# Patient Record
Sex: FEMALE | Race: WHITE | NOT HISPANIC OR LATINO | Employment: FULL TIME | URBAN - METROPOLITAN AREA
[De-identification: names, ages, dates, MRNs, and addresses within clinical notes are randomized per-mention and may not be internally consistent; named-entity substitution may affect disease eponyms.]

---

## 2017-03-06 ENCOUNTER — ALLSCRIPTS OFFICE VISIT (OUTPATIENT)
Dept: OTHER | Facility: OTHER | Age: 42
End: 2017-03-06

## 2017-03-06 DIAGNOSIS — R51.9 HEADACHE: ICD-10-CM

## 2017-03-06 DIAGNOSIS — R73.09 OTHER ABNORMAL GLUCOSE: ICD-10-CM

## 2017-03-06 DIAGNOSIS — E78.5 HYPERLIPIDEMIA: ICD-10-CM

## 2017-03-06 DIAGNOSIS — J45.909 UNCOMPLICATED ASTHMA: ICD-10-CM

## 2017-03-06 DIAGNOSIS — E88.9 METABOLIC DISORDER: ICD-10-CM

## 2017-05-02 ENCOUNTER — APPOINTMENT (OUTPATIENT)
Dept: LAB | Facility: HOSPITAL | Age: 42
End: 2017-05-02
Payer: COMMERCIAL

## 2017-05-02 DIAGNOSIS — E78.5 HYPERLIPIDEMIA: ICD-10-CM

## 2017-05-02 DIAGNOSIS — R51.9 HEADACHE: ICD-10-CM

## 2017-05-02 DIAGNOSIS — E88.9 METABOLIC DISORDER: ICD-10-CM

## 2017-05-02 DIAGNOSIS — R73.09 OTHER ABNORMAL GLUCOSE: ICD-10-CM

## 2017-05-02 DIAGNOSIS — J45.909 UNCOMPLICATED ASTHMA: ICD-10-CM

## 2017-05-02 LAB
25(OH)D3 SERPL-MCNC: 11.8 NG/ML (ref 30–100)
ALBUMIN SERPL BCP-MCNC: 3.5 G/DL (ref 3.5–5)
ALP SERPL-CCNC: 88 U/L (ref 46–116)
ALT SERPL W P-5'-P-CCNC: 18 U/L (ref 12–78)
ANION GAP SERPL CALCULATED.3IONS-SCNC: 9 MMOL/L (ref 4–13)
AST SERPL W P-5'-P-CCNC: 9 U/L (ref 5–45)
BILIRUB SERPL-MCNC: 0.25 MG/DL (ref 0.2–1)
BUN SERPL-MCNC: 10 MG/DL (ref 5–25)
CALCIUM SERPL-MCNC: 9 MG/DL (ref 8.3–10.1)
CHLORIDE SERPL-SCNC: 105 MMOL/L (ref 100–108)
CHOLEST SERPL-MCNC: 176 MG/DL (ref 50–200)
CO2 SERPL-SCNC: 23 MMOL/L (ref 21–32)
CREAT SERPL-MCNC: 0.61 MG/DL (ref 0.6–1.3)
EST. AVERAGE GLUCOSE BLD GHB EST-MCNC: 143 MG/DL
GFR SERPL CREATININE-BSD FRML MDRD: >60 ML/MIN/1.73SQ M
GLUCOSE P FAST SERPL-MCNC: 140 MG/DL (ref 65–99)
HBA1C MFR BLD: 6.6 % (ref 4.2–6.3)
HDLC SERPL-MCNC: 31 MG/DL (ref 40–60)
LDLC SERPL CALC-MCNC: 116 MG/DL (ref 0–100)
POTASSIUM SERPL-SCNC: 4.2 MMOL/L (ref 3.5–5.3)
PROT SERPL-MCNC: 7.2 G/DL (ref 6.4–8.2)
SODIUM SERPL-SCNC: 137 MMOL/L (ref 136–145)
TRIGL SERPL-MCNC: 144 MG/DL
TSH SERPL DL<=0.05 MIU/L-ACNC: 3.12 UIU/ML (ref 0.36–3.74)

## 2017-05-02 PROCEDURE — 84443 ASSAY THYROID STIM HORMONE: CPT

## 2017-05-02 PROCEDURE — 80061 LIPID PANEL: CPT

## 2017-05-02 PROCEDURE — 82306 VITAMIN D 25 HYDROXY: CPT

## 2017-05-02 PROCEDURE — 80053 COMPREHEN METABOLIC PANEL: CPT

## 2017-05-02 PROCEDURE — 83036 HEMOGLOBIN GLYCOSYLATED A1C: CPT

## 2017-05-02 PROCEDURE — 36415 COLL VENOUS BLD VENIPUNCTURE: CPT

## 2017-05-04 ENCOUNTER — ALLSCRIPTS OFFICE VISIT (OUTPATIENT)
Dept: OTHER | Facility: OTHER | Age: 42
End: 2017-05-04

## 2017-05-04 DIAGNOSIS — M54.50 LOW BACK PAIN: ICD-10-CM

## 2017-06-28 ENCOUNTER — GENERIC CONVERSION - ENCOUNTER (OUTPATIENT)
Dept: OTHER | Facility: OTHER | Age: 42
End: 2017-06-28

## 2017-08-01 DIAGNOSIS — R73.09 OTHER ABNORMAL GLUCOSE: ICD-10-CM

## 2017-08-01 DIAGNOSIS — E78.5 HYPERLIPIDEMIA: ICD-10-CM

## 2018-01-11 NOTE — RESULT NOTES
Message  Our records indicate that you are overdue for a diabetic eye exam  Please contact your eye doctor to schedule an appointment with their office  If you do not have an eye doctor, please contact our office and we will recommend one for you  We can be reached at [phone]   Thank you      Signatures   Electronically signed by : Dia Hawkins, ; Jun 28 2017 11:26AM EST                       (Author)

## 2018-01-13 VITALS
SYSTOLIC BLOOD PRESSURE: 138 MMHG | DIASTOLIC BLOOD PRESSURE: 80 MMHG | WEIGHT: 214 LBS | BODY MASS INDEX: 44.92 KG/M2 | HEIGHT: 58 IN

## 2018-01-14 VITALS
SYSTOLIC BLOOD PRESSURE: 140 MMHG | HEIGHT: 58 IN | DIASTOLIC BLOOD PRESSURE: 88 MMHG | RESPIRATION RATE: 1 BRPM | BODY MASS INDEX: 45 KG/M2 | WEIGHT: 214.38 LBS

## 2018-09-17 ENCOUNTER — TELEPHONE (OUTPATIENT)
Dept: FAMILY MEDICINE CLINIC | Facility: CLINIC | Age: 43
End: 2018-09-17

## 2018-09-17 NOTE — TELEPHONE ENCOUNTER
Patient called and stated she needs a form filled out so she can get kinship however she has no insurance and can not afford the cost of a physical  Asking if you can fill the form out for her without an appt  States there is only 2 questions on the form

## 2018-09-18 ENCOUNTER — TELEPHONE (OUTPATIENT)
Dept: FAMILY MEDICINE CLINIC | Facility: CLINIC | Age: 43
End: 2018-09-18

## 2018-09-19 NOTE — TELEPHONE ENCOUNTER
Can you please do the PPL form 
Form faxed
Patient called and asked if we can call PPL to get her power back on   Asking if we can do this again for her,    Act # [phone]  Act Gonzalez Name- Santi Tubbs
Please do 1 more time but let her know we cannot do it after that
Followed protocol

## 2020-03-20 ENCOUNTER — TELEPHONE (OUTPATIENT)
Dept: FAMILY MEDICINE CLINIC | Facility: CLINIC | Age: 45
End: 2020-03-20

## 2020-03-20 NOTE — TELEPHONE ENCOUNTER
If possible we need to find out more information  When did this happen  If the friend who has it and was not in the room with the patient she was visiting then that is more a very remote situation  Right now that would not qualify her to be tested  Obviously if she started with fever and or cough she would need to call our office over the weekend and [facility] with director as to what to do    Has her employer guided her on any specific situation or not

## 2020-03-20 NOTE — TELEPHONE ENCOUNTER
Patient works in the field for healthcare  She came in contact with a person that tested positive for COVID-19  She was doing a visit for a patient and their friend has it  Should she continue to work or stay home?     [phone]

## 2020-03-20 NOTE — TELEPHONE ENCOUNTER
I spoke to the patient, she is feeling fine  She was at the home of a client today, while in the home the client was notified that her granddaughter had developed a fever of 103 and was on her way to be tested for covid-19  The granddaughter was last in the home over the weekend  Patient had no contact with the granddaughter  Patient did contact her employer who told her to call her PCP  Patient is asking if she needs to stay quarantined or if she is able to work at this time  She is currently not having any symptoms other than her normal asthma/allergy type symptoms, no fever

## 2020-03-20 NOTE — TELEPHONE ENCOUNTER
Patient requires no precautions at this time until further testing comes back and or if she does develop symptoms

## 2020-07-13 ENCOUNTER — OFFICE VISIT (OUTPATIENT)
Dept: FAMILY MEDICINE CLINIC | Facility: CLINIC | Age: 45
End: 2020-07-13

## 2020-07-13 VITALS
WEIGHT: 211 LBS | RESPIRATION RATE: 18 BRPM | HEIGHT: 59 IN | SYSTOLIC BLOOD PRESSURE: 136 MMHG | HEART RATE: 88 BPM | TEMPERATURE: 97.6 F | DIASTOLIC BLOOD PRESSURE: 80 MMHG | BODY MASS INDEX: 42.54 KG/M2

## 2020-07-13 DIAGNOSIS — G43.909 MIGRAINE WITHOUT STATUS MIGRAINOSUS, NOT INTRACTABLE, UNSPECIFIED MIGRAINE TYPE: ICD-10-CM

## 2020-07-13 DIAGNOSIS — J45.40 MODERATE PERSISTENT ASTHMA WITHOUT COMPLICATION: ICD-10-CM

## 2020-07-13 DIAGNOSIS — E11.9 CONTROLLED TYPE 2 DIABETES MELLITUS WITHOUT COMPLICATION, WITHOUT LONG-TERM CURRENT USE OF INSULIN (HCC): ICD-10-CM

## 2020-07-13 DIAGNOSIS — E55.9 VITAMIN D DEFICIENCY: ICD-10-CM

## 2020-07-13 DIAGNOSIS — J30.2 SEASONAL ALLERGIES: ICD-10-CM

## 2020-07-13 DIAGNOSIS — K21.9 GASTROESOPHAGEAL REFLUX DISEASE, ESOPHAGITIS PRESENCE NOT SPECIFIED: ICD-10-CM

## 2020-07-13 DIAGNOSIS — Z12.31 SCREENING MAMMOGRAM, ENCOUNTER FOR: Primary | ICD-10-CM

## 2020-07-13 RX ORDER — TOPIRAMATE 25 MG/1
TABLET ORAL
COMMUNITY
Start: 2013-03-28 | End: 2020-07-14 | Stop reason: SDUPTHER

## 2020-07-13 RX ORDER — ALBUTEROL SULFATE 2.5 MG/3ML
1 SOLUTION RESPIRATORY (INHALATION) EVERY 4 HOURS PRN
COMMUNITY
End: 2021-03-02 | Stop reason: SDUPTHER

## 2020-07-13 RX ORDER — RIZATRIPTAN BENZOATE 5 MG/1
1 TABLET ORAL
COMMUNITY
Start: 2017-05-04 | End: 2020-07-14 | Stop reason: SDUPTHER

## 2020-07-13 RX ORDER — OMEPRAZOLE 20 MG/1
1 CAPSULE, DELAYED RELEASE ORAL DAILY
COMMUNITY
Start: 2012-03-28 | End: 2020-07-14

## 2020-07-13 RX ORDER — LORATADINE 10 MG/1
1 TABLET ORAL DAILY PRN
COMMUNITY
Start: 2014-05-29 | End: 2020-07-14 | Stop reason: SDUPTHER

## 2020-07-13 RX ORDER — ALBUTEROL SULFATE 90 UG/1
1-2 AEROSOL, METERED RESPIRATORY (INHALATION)
COMMUNITY
Start: 2011-05-18 | End: 2021-03-02 | Stop reason: SDUPTHER

## 2020-07-13 RX ORDER — MONTELUKAST SODIUM 10 MG/1
1 TABLET ORAL
COMMUNITY
Start: 2013-04-26 | End: 2020-07-14 | Stop reason: SDUPTHER

## 2020-07-14 RX ORDER — LORATADINE 10 MG/1
10 TABLET ORAL DAILY PRN
Qty: 30 TABLET | Refills: 5 | Status: SHIPPED | OUTPATIENT
Start: 2020-07-14 | End: 2021-03-11

## 2020-07-14 RX ORDER — OMEPRAZOLE 20 MG/1
20 CAPSULE, DELAYED RELEASE ORAL DAILY
Qty: 30 CAPSULE | Refills: 5 | Status: SHIPPED | OUTPATIENT
Start: 2020-07-14 | End: 2020-12-04

## 2020-07-14 RX ORDER — TOPIRAMATE 25 MG/1
TABLET ORAL
Qty: 120 TABLET | Refills: 1 | Status: SHIPPED | OUTPATIENT
Start: 2020-07-14 | End: 2020-09-13

## 2020-07-14 RX ORDER — RIZATRIPTAN BENZOATE 5 MG/1
TABLET ORAL
Qty: 9 TABLET | Refills: 2 | Status: SHIPPED | OUTPATIENT
Start: 2020-07-14 | End: 2021-03-02 | Stop reason: SDUPTHER

## 2020-07-14 RX ORDER — MONTELUKAST SODIUM 10 MG/1
10 TABLET ORAL
Qty: 30 TABLET | Refills: 5 | Status: SHIPPED | OUTPATIENT
Start: 2020-07-14 | End: 2020-12-02

## 2020-07-16 LAB
25(OH)D3 SERPL-MCNC: 18 NG/ML (ref 30–100)
ALBUMIN SERPL-MCNC: 4.2 G/DL (ref 3.6–5.1)
ALBUMIN/CREAT UR: ABNORMAL MCG/MG CREAT
ALBUMIN/GLOB SERPL: 1.5 (CALC) (ref 1–2.5)
ALP SERPL-CCNC: 83 U/L (ref 31–125)
ALT SERPL-CCNC: 9 U/L (ref 6–29)
AST SERPL-CCNC: 11 U/L (ref 10–30)
BILIRUB SERPL-MCNC: 0.3 MG/DL (ref 0.2–1.2)
BUN SERPL-MCNC: 11 MG/DL (ref 7–25)
BUN/CREAT SERPL: ABNORMAL (CALC) (ref 6–22)
CALCIUM SERPL-MCNC: 9.7 MG/DL (ref 8.6–10.2)
CHLORIDE SERPL-SCNC: 106 MMOL/L (ref 98–110)
CHOLEST SERPL-MCNC: 211 MG/DL
CHOLEST/HDLC SERPL: 5.6 (CALC)
CO2 SERPL-SCNC: 21 MMOL/L (ref 20–32)
CREAT SERPL-MCNC: 0.6 MG/DL (ref 0.5–1.1)
CREAT UR-MCNC: 15 MG/DL (ref 20–275)
GLOBULIN SER CALC-MCNC: 2.8 G/DL (CALC) (ref 1.9–3.7)
GLUCOSE SERPL-MCNC: 108 MG/DL (ref 65–99)
HBA1C MFR BLD: 6.3 % OF TOTAL HGB
HDLC SERPL-MCNC: 38 MG/DL
LDLC SERPL CALC-MCNC: 143 MG/DL (CALC)
MICROALBUMIN UR-MCNC: <0.2 MG/DL
NONHDLC SERPL-MCNC: 173 MG/DL (CALC)
POTASSIUM SERPL-SCNC: 4.4 MMOL/L (ref 3.5–5.3)
PROT SERPL-MCNC: 7 G/DL (ref 6.1–8.1)
SL AMB EGFR AFRICAN AMERICAN: 129 ML/MIN/1.73M2
SL AMB EGFR NON AFRICAN AMERICAN: 112 ML/MIN/1.73M2
SODIUM SERPL-SCNC: 136 MMOL/L (ref 135–146)
TRIGL SERPL-MCNC: 161 MG/DL
TSH SERPL-ACNC: 2.53 MIU/L

## 2020-07-21 ENCOUNTER — TELEPHONE (OUTPATIENT)
Dept: FAMILY MEDICINE CLINIC | Facility: CLINIC | Age: 45
End: 2020-07-21

## 2020-07-21 NOTE — TELEPHONE ENCOUNTER
----- Message from Fidencio Phoenix DO sent at 6/01/8631  8:36 AM EDT -----  Please let the patient know that all her labs were okay except her cholesterol is 211  A1c was under good control for her diabetes  Vitamin-D was 18  Recommend at least 2-3000 units of over-the-counter vitamin-D daily    Needs office visit in 6 months with office A1c at that time

## 2020-09-12 DIAGNOSIS — G43.909 MIGRAINE WITHOUT STATUS MIGRAINOSUS, NOT INTRACTABLE, UNSPECIFIED MIGRAINE TYPE: ICD-10-CM

## 2020-09-13 RX ORDER — TOPIRAMATE 25 MG/1
TABLET ORAL
Qty: 120 TABLET | Refills: 1 | Status: SHIPPED | OUTPATIENT
Start: 2020-09-13 | End: 2021-02-05

## 2020-12-01 DIAGNOSIS — J45.40 MODERATE PERSISTENT ASTHMA WITHOUT COMPLICATION: ICD-10-CM

## 2020-12-01 DIAGNOSIS — J30.2 SEASONAL ALLERGIES: ICD-10-CM

## 2020-12-02 RX ORDER — MONTELUKAST SODIUM 10 MG/1
TABLET ORAL
Qty: 30 TABLET | Refills: 5 | Status: SHIPPED | OUTPATIENT
Start: 2020-12-02 | End: 2021-03-02 | Stop reason: SDUPTHER

## 2020-12-03 DIAGNOSIS — K21.9 GASTROESOPHAGEAL REFLUX DISEASE: ICD-10-CM

## 2020-12-04 RX ORDER — OMEPRAZOLE 20 MG/1
CAPSULE, DELAYED RELEASE ORAL
Qty: 30 CAPSULE | Refills: 5 | Status: SHIPPED | OUTPATIENT
Start: 2020-12-04 | End: 2021-05-25

## 2021-01-27 DIAGNOSIS — J45.40 MODERATE PERSISTENT ASTHMA WITHOUT COMPLICATION: ICD-10-CM

## 2021-02-05 DIAGNOSIS — G43.909 MIGRAINE WITHOUT STATUS MIGRAINOSUS, NOT INTRACTABLE, UNSPECIFIED MIGRAINE TYPE: ICD-10-CM

## 2021-02-05 RX ORDER — TOPIRAMATE 25 MG/1
TABLET ORAL
Qty: 120 TABLET | Refills: 1 | Status: SHIPPED | OUTPATIENT
Start: 2021-02-05 | End: 2021-03-01

## 2021-02-28 DIAGNOSIS — G43.909 MIGRAINE WITHOUT STATUS MIGRAINOSUS, NOT INTRACTABLE, UNSPECIFIED MIGRAINE TYPE: ICD-10-CM

## 2021-03-01 RX ORDER — TOPIRAMATE 25 MG/1
TABLET ORAL
Qty: 120 TABLET | Refills: 1 | Status: SHIPPED | OUTPATIENT
Start: 2021-03-01 | End: 2021-03-02 | Stop reason: SDUPTHER

## 2021-03-02 ENCOUNTER — OFFICE VISIT (OUTPATIENT)
Dept: FAMILY MEDICINE CLINIC | Facility: CLINIC | Age: 46
End: 2021-03-02
Payer: COMMERCIAL

## 2021-03-02 VITALS
DIASTOLIC BLOOD PRESSURE: 80 MMHG | OXYGEN SATURATION: 97 % | WEIGHT: 200.4 LBS | TEMPERATURE: 97.6 F | HEIGHT: 59 IN | SYSTOLIC BLOOD PRESSURE: 124 MMHG | BODY MASS INDEX: 40.4 KG/M2 | HEART RATE: 89 BPM | RESPIRATION RATE: 16 BRPM

## 2021-03-02 DIAGNOSIS — E11.9 CONTROLLED TYPE 2 DIABETES MELLITUS WITHOUT COMPLICATION, WITHOUT LONG-TERM CURRENT USE OF INSULIN (HCC): Primary | ICD-10-CM

## 2021-03-02 DIAGNOSIS — G43.909 MIGRAINE WITHOUT STATUS MIGRAINOSUS, NOT INTRACTABLE, UNSPECIFIED MIGRAINE TYPE: ICD-10-CM

## 2021-03-02 DIAGNOSIS — Z23 NEED FOR TDAP VACCINATION: ICD-10-CM

## 2021-03-02 DIAGNOSIS — Z11.4 SCREENING FOR HIV (HUMAN IMMUNODEFICIENCY VIRUS): ICD-10-CM

## 2021-03-02 DIAGNOSIS — Z23 ENCOUNTER FOR IMMUNIZATION: ICD-10-CM

## 2021-03-02 DIAGNOSIS — J30.2 SEASONAL ALLERGIES: ICD-10-CM

## 2021-03-02 DIAGNOSIS — Z11.4 ENCOUNTER FOR SCREENING FOR HIV: ICD-10-CM

## 2021-03-02 DIAGNOSIS — J45.40 MODERATE PERSISTENT ASTHMA WITHOUT COMPLICATION: ICD-10-CM

## 2021-03-02 PROCEDURE — 90715 TDAP VACCINE 7 YRS/> IM: CPT

## 2021-03-02 PROCEDURE — 3008F BODY MASS INDEX DOCD: CPT | Performed by: FAMILY MEDICINE

## 2021-03-02 PROCEDURE — 90471 IMMUNIZATION ADMIN: CPT

## 2021-03-02 PROCEDURE — 3725F SCREEN DEPRESSION PERFORMED: CPT | Performed by: FAMILY MEDICINE

## 2021-03-02 PROCEDURE — 83036 HEMOGLOBIN GLYCOSYLATED A1C: CPT | Performed by: FAMILY MEDICINE

## 2021-03-02 PROCEDURE — 99214 OFFICE O/P EST MOD 30 MIN: CPT | Performed by: FAMILY MEDICINE

## 2021-03-02 RX ORDER — TOPIRAMATE 25 MG/1
50 TABLET ORAL 2 TIMES DAILY
Qty: 120 TABLET | Refills: 1 | Status: SHIPPED | OUTPATIENT
Start: 2021-03-02 | End: 2021-03-31

## 2021-03-02 RX ORDER — ALBUTEROL SULFATE 2.5 MG/3ML
2.5 SOLUTION RESPIRATORY (INHALATION) EVERY 4 HOURS PRN
Qty: 25 VIAL | Refills: 3 | Status: SHIPPED | OUTPATIENT
Start: 2021-03-02 | End: 2022-07-27 | Stop reason: SDUPTHER

## 2021-03-02 RX ORDER — MONTELUKAST SODIUM 10 MG/1
10 TABLET ORAL
Qty: 30 TABLET | Refills: 5 | Status: SHIPPED | OUTPATIENT
Start: 2021-03-02 | End: 2021-11-24

## 2021-03-02 RX ORDER — ALBUTEROL SULFATE 90 UG/1
1-2 AEROSOL, METERED RESPIRATORY (INHALATION) EVERY 4 HOURS PRN
Qty: 1 INHALER | Refills: 2 | Status: SHIPPED | OUTPATIENT
Start: 2021-03-02 | End: 2022-07-27 | Stop reason: SDUPTHER

## 2021-03-02 RX ORDER — FLUTICASONE PROPIONATE 50 MCG
1 SPRAY, SUSPENSION (ML) NASAL DAILY
Qty: 1 BOTTLE | Refills: 5 | Status: SHIPPED | OUTPATIENT
Start: 2021-03-02 | End: 2022-07-27 | Stop reason: SDUPTHER

## 2021-03-02 RX ORDER — CETIRIZINE HYDROCHLORIDE 10 MG/1
10 TABLET ORAL DAILY
Qty: 30 TABLET | Refills: 5 | Status: SHIPPED | OUTPATIENT
Start: 2021-03-02 | End: 2022-07-27 | Stop reason: SDUPTHER

## 2021-03-02 RX ORDER — RIZATRIPTAN BENZOATE 5 MG/1
TABLET ORAL
Qty: 9 TABLET | Refills: 2 | Status: SHIPPED | OUTPATIENT
Start: 2021-03-02 | End: 2021-09-28

## 2021-03-11 PROBLEM — E66.01 MORBID OBESITY (HCC): Status: ACTIVE | Noted: 2021-03-11

## 2021-03-11 NOTE — PROGRESS NOTES
Assessment/Plan: the A1c in July was 6 3  Full labs ordered lipid profile TSH CMP microalbumin creatinine ratio  Staff has ordered HIV even though patient feels she is low risk  Recommend yearly diabetic eye and foot examination  Risk factor modification with regards to morbid obesity  COVID vaccine discussed  Recommend referral to gyn for overdue Pap smear/ way overdue  Asthma stable  Migraines stable  Tdap given,  Patient's shoes and socks removed  Right Foot/Ankle   Right Foot Inspection  Skin Exam: skin normal and skin intact no dry skin, no warmth, no callus, no erythema, no maceration, no abnormal color, no pre-ulcer, no ulcer and no callus                          Toe Exam:  no right toe deformity  Sensory       Monofilament testing: intact  Vascular    The right DP pulse is 2+  The right PT pulse is 2+  Left Foot/Ankle  Left Foot Inspection  Skin Exam: skin normal and skin intactno dry skin, no warmth, no erythema, no maceration, normal color, no pre-ulcer, no ulcer and no callus                         Toe Exam: no left toe deformity                   Sensory       Monofilament: intact  Vascular    The left DP pulse is 2+  The left PT pulse is 2+  Assign Risk Category:  No deformity present; No loss of protective sensation; No weak pulses       Risk: 0           Diagnoses and all orders for this visit:    Controlled type 2 diabetes mellitus without complication, without long-term current use of insulin (HCC)  -     Microalbumin / creatinine urine ratio  -     Hemoglobin A1C; Future  -     Comprehensive metabolic panel; Future  -     TSH, 3rd generation; Future  -     POCT hemoglobin A1c  -     Lipid panel; Future    Moderate persistent asthma without complication  -     fluticasone-salmeterol (Advair Diskus) 250-50 mcg/dose inhaler; Inhale 1 puff 2 (two) times a day Rinse mouth after use  -     albuterol (2 5 mg/3 mL) 0 083 % nebulizer solution;  Take 1 vial (2 5 mg total) by nebulization every 4 (four) hours as needed for wheezing  -     albuterol (Ventolin HFA) 90 mcg/act inhaler; Inhale 1-2 puffs every 4 (four) hours as needed for wheezing  -     montelukast (SINGULAIR) 10 mg tablet; Take 1 tablet (10 mg total) by mouth daily at bedtime    Migraine without status migrainosus, not intractable, unspecified migraine type  -     topiramate (TOPAMAX) 25 mg tablet; Take 2 tablets (50 mg total) by mouth 2 (two) times a day  -     rizatriptan (MAXALT) 5 MG tablet; Take 1 tablet at onset of migraine  May repeat 2nd dose 2 hours later if migraine not improved  -     Ambulatory referral to Neurology; Future    Seasonal allergies  -     montelukast (SINGULAIR) 10 mg tablet; Take 1 tablet (10 mg total) by mouth daily at bedtime  -     cetirizine (ZyrTEC) 10 mg tablet; Take 1 tablet (10 mg total) by mouth daily  -     fluticasone (FLONASE) 50 mcg/act nasal spray; 1 spray into each nostril daily    Screening for HIV (human immunodeficiency virus)  -     HIV 1/2 Antigen/Antibody (4th Generation) w Reflex [facility]; Future    Encounter for immunization  -     tetanus-diphtheria-acellular pertussis (ADACEL) 5-2-15 5 LF-mcg/0 5 injection; Inject 0 5 mL into a muscle once for 1 dose    Encounter for screening for HIV    Need for Tdap vaccination  -     TDAP VACCINE GREATER THAN OR EQUAL TO 8YO IM    Other orders  -     Cancel: TDAP VACCINE GREATER THAN OR EQUAL TO 8YO IM  -     Cancel: Hemoglobin A1C ([facility]); Future  -     Cancel: Ambulatory Referral to Ophthalmology; Future  -     Cancel: Ambulatory referral to Obstetrics / Gynecology; Future        1  Controlled type 2 diabetes mellitus without complication, without long-term current use of insulin (HCC)  Microalbumin / creatinine urine ratio    Hemoglobin A1C    Comprehensive metabolic panel    TSH, 3rd generation    POCT hemoglobin A1c    Lipid panel   2   Moderate persistent asthma without complication  fluticasone-salmeterol (Advair Diskus) 250-50 mcg/dose inhaler    albuterol (2 5 mg/3 mL) 0 083 % nebulizer solution    albuterol (Ventolin HFA) 90 mcg/act inhaler    montelukast (SINGULAIR) 10 mg tablet   3  Migraine without status migrainosus, not intractable, unspecified migraine type  topiramate (TOPAMAX) 25 mg tablet    rizatriptan (MAXALT) 5 MG tablet    Ambulatory referral to Neurology   4  Seasonal allergies  montelukast (SINGULAIR) 10 mg tablet    cetirizine (ZyrTEC) 10 mg tablet    fluticasone (FLONASE) 50 mcg/act nasal spray   5  Screening for HIV (human immunodeficiency virus)  HIV 1/2 Antigen/Antibody (4th Generation) w Reflex [facility]   6  Encounter for immunization  tetanus-diphtheria-acellular pertussis (ADACEL) 5-2-15 5 LF-mcg/0 5 injection   7  Encounter for screening for HIV     8  Need for Tdap vaccination  TDAP VACCINE GREATER THAN OR EQUAL TO 8YO IM       Subjective:        Patient ID: Nadine Whaley is a 39 y o  female  Chief Complaint   Patient presents with    Follow-up     6 month followup         Patient for 6 minutes routine visit  Lab done  Asthma stable  No overall change in life work or diet  The following portions of the patient's history were reviewed and updated as appropriate: past medical history, past surgical history and problem list       Review of Systems   Constitutional: Negative for appetite change, fatigue, fever and unexpected weight change  HENT: Negative for congestion, ear pain, postnasal drip, rhinorrhea, sinus pressure, sinus pain and sore throat  Eyes: Negative for redness and visual disturbance  Respiratory: Negative for chest tightness and shortness of breath  Cardiovascular: Negative for chest pain, palpitations and leg swelling  Gastrointestinal: Negative for abdominal distention, abdominal pain, diarrhea and nausea  Endocrine: Negative for cold intolerance and heat intolerance  Genitourinary: Negative for dysuria and hematuria     Musculoskeletal: Negative for arthralgias, gait problem and myalgias  Skin: Negative for pallor and rash  Neurological: Negative for dizziness and headaches  Psychiatric/Behavioral: Negative for behavioral problems  The patient is not nervous/anxious  Objective:  /80   Pulse 89   Temp 97 6 °F (36 4 °C) (Temporal)   Resp 16   Ht 4' 11" (1 499 m)   Wt 90 9 kg (200 lb 6 4 oz)   SpO2 97%   BMI 40 48 kg/m²        Physical Exam  Vitals signs and nursing note reviewed  Constitutional:       General: She is not in acute distress  Appearance: She is obese  She is not diaphoretic  HENT:      Head: Normocephalic and atraumatic  Right Ear: Tympanic membrane normal       Left Ear: Tympanic membrane normal    Eyes:      General: No scleral icterus  Conjunctiva/sclera: Conjunctivae normal       Pupils: Pupils are equal, round, and reactive to light  Neck:      Musculoskeletal: Normal range of motion and neck supple  Thyroid: No thyromegaly  Cardiovascular:      Rate and Rhythm: Normal rate and regular rhythm  Pulses: no weak pulses          Carotid pulses are 0 on the right side and 0 on the left side  Dorsalis pedis pulses are 2+ on the right side and 2+ on the left side  Posterior tibial pulses are 2+ on the right side and 2+ on the left side  Heart sounds: Normal heart sounds  No murmur  Pulmonary:      Effort: Pulmonary effort is normal  No respiratory distress  Breath sounds: Normal breath sounds  No wheezing  Abdominal:      General: There is no distension  Palpations: Abdomen is soft  Feet:      Right foot:      Skin integrity: No ulcer, skin breakdown, erythema, warmth, callus or dry skin  Left foot:      Skin integrity: No ulcer, skin breakdown, erythema, warmth, callus or dry skin  Lymphadenopathy:      Cervical: No cervical adenopathy  Skin:     General: Skin is warm  Coloration: Skin is not pale  Neurological:      General: No focal deficit present  Mental Status: She is alert and oriented to person, place, and time  Cranial Nerves: No cranial nerve deficit  Deep Tendon Reflexes: Reflexes are normal and symmetric  Psychiatric:         Mood and Affect: Mood normal          Behavior: Behavior normal          Thought Content:  Thought content normal          Judgment: Judgment normal

## 2021-03-11 NOTE — PROGRESS NOTES
BMI Counseling: Body mass index is 40 48 kg/m²  The BMI is above normal  Nutrition recommendations include decreasing portion sizes, encouraging healthy choices of fruits and vegetables, decreasing fast food intake, consuming healthier snacks, limiting drinks that contain sugar, moderation in carbohydrate intake, increasing intake of lean protein, reducing intake of saturated and trans fat and reducing intake of cholesterol  Exercise recommendations include exercising 3-5 times per week

## 2021-03-16 LAB — SL AMB POCT HEMOGLOBIN AIC: 6.6 (ref ?–6.5)

## 2021-03-16 PROCEDURE — 3044F HG A1C LEVEL LT 7.0%: CPT | Performed by: FAMILY MEDICINE

## 2021-03-30 DIAGNOSIS — Z23 ENCOUNTER FOR IMMUNIZATION: ICD-10-CM

## 2021-03-30 DIAGNOSIS — G43.909 MIGRAINE WITHOUT STATUS MIGRAINOSUS, NOT INTRACTABLE, UNSPECIFIED MIGRAINE TYPE: ICD-10-CM

## 2021-03-31 RX ORDER — TOPIRAMATE 25 MG/1
TABLET ORAL
Qty: 120 TABLET | Refills: 1 | Status: SHIPPED | OUTPATIENT
Start: 2021-03-31 | End: 2021-04-25

## 2021-04-01 ENCOUNTER — IMMUNIZATIONS (OUTPATIENT)
Dept: FAMILY MEDICINE CLINIC | Facility: HOSPITAL | Age: 46
End: 2021-04-01

## 2021-04-01 DIAGNOSIS — Z23 ENCOUNTER FOR IMMUNIZATION: Primary | ICD-10-CM

## 2021-04-01 PROCEDURE — 91300 SARS-COV-2 / COVID-19 MRNA VACCINE (PFIZER-BIONTECH) 30 MCG: CPT

## 2021-04-01 PROCEDURE — 0001A SARS-COV-2 / COVID-19 MRNA VACCINE (PFIZER-BIONTECH) 30 MCG: CPT

## 2021-04-22 ENCOUNTER — IMMUNIZATIONS (OUTPATIENT)
Dept: FAMILY MEDICINE CLINIC | Facility: HOSPITAL | Age: 46
End: 2021-04-22

## 2021-04-22 DIAGNOSIS — Z23 ENCOUNTER FOR IMMUNIZATION: Primary | ICD-10-CM

## 2021-04-22 PROCEDURE — 91300 SARS-COV-2 / COVID-19 MRNA VACCINE (PFIZER-BIONTECH) 30 MCG: CPT

## 2021-04-22 PROCEDURE — 0002A SARS-COV-2 / COVID-19 MRNA VACCINE (PFIZER-BIONTECH) 30 MCG: CPT

## 2021-04-25 DIAGNOSIS — G43.909 MIGRAINE WITHOUT STATUS MIGRAINOSUS, NOT INTRACTABLE, UNSPECIFIED MIGRAINE TYPE: ICD-10-CM

## 2021-04-25 RX ORDER — TOPIRAMATE 25 MG/1
TABLET ORAL
Qty: 120 TABLET | Refills: 1 | Status: SHIPPED | OUTPATIENT
Start: 2021-04-25 | End: 2021-05-23

## 2021-05-18 ENCOUNTER — TELEPHONE (OUTPATIENT)
Dept: NEUROLOGY | Facility: CLINIC | Age: 46
End: 2021-05-18

## 2021-05-18 NOTE — TELEPHONE ENCOUNTER
Best contact number for patient:  [phone]  Emergency Contact name and number:  Orlando Medel [phone]  Referring provider and telephone number:  Terell Ware [phone]  Primary Care Provider Name and if affiliated with [facility]:   Deven Yes  Reason for Appointment/Dx:  migraines  Have you seen and followed up with a pediatric Neurologist for this disease in the past?      No (If yes ok to schedule with Dr Nanci Reese)    Neurology Location patient would like to be seen:  Any  Order received? Yes                                                 Records Received? Yes  Have you ever seen another Neurologist?       No    Insurance [address] [place] [address]     ID/Policy #:[id number]    Secondary Insurance:    ID/Policy#: Workman's Comp/ Accident/ School  Information      Workman's Comp/Accident/School related?        No    If yes name of Insurance company:    Claim #:    Date of Injury:    Type of Injury:     Name and Telephone Number:    Notes:                   Appointment date:   9/28/2021

## 2021-05-21 DIAGNOSIS — G43.909 MIGRAINE WITHOUT STATUS MIGRAINOSUS, NOT INTRACTABLE, UNSPECIFIED MIGRAINE TYPE: ICD-10-CM

## 2021-05-23 RX ORDER — TOPIRAMATE 25 MG/1
TABLET ORAL
Qty: 120 TABLET | Refills: 1 | Status: SHIPPED | OUTPATIENT
Start: 2021-05-23 | End: 2021-07-19

## 2021-05-25 DIAGNOSIS — K21.9 GASTROESOPHAGEAL REFLUX DISEASE: ICD-10-CM

## 2021-05-25 RX ORDER — OMEPRAZOLE 20 MG/1
CAPSULE, DELAYED RELEASE ORAL
Qty: 30 CAPSULE | Refills: 5 | Status: SHIPPED | OUTPATIENT
Start: 2021-05-25 | End: 2021-11-24

## 2021-07-17 DIAGNOSIS — G43.909 MIGRAINE WITHOUT STATUS MIGRAINOSUS, NOT INTRACTABLE, UNSPECIFIED MIGRAINE TYPE: ICD-10-CM

## 2021-07-18 DIAGNOSIS — J45.40 MODERATE PERSISTENT ASTHMA WITHOUT COMPLICATION: ICD-10-CM

## 2021-07-19 RX ORDER — TOPIRAMATE 25 MG/1
TABLET ORAL
Qty: 120 TABLET | Refills: 1 | Status: SHIPPED | OUTPATIENT
Start: 2021-07-19 | End: 2021-08-16

## 2021-09-07 DIAGNOSIS — G43.909 MIGRAINE WITHOUT STATUS MIGRAINOSUS, NOT INTRACTABLE, UNSPECIFIED MIGRAINE TYPE: ICD-10-CM

## 2021-09-07 RX ORDER — TOPIRAMATE 25 MG/1
TABLET ORAL
Qty: 120 TABLET | Refills: 0 | Status: SHIPPED | OUTPATIENT
Start: 2021-09-07 | End: 2021-10-07

## 2021-09-23 ENCOUNTER — TELEPHONE (OUTPATIENT)
Dept: NEUROLOGY | Facility: CLINIC | Age: 46
End: 2021-09-23

## 2021-09-23 NOTE — TELEPHONE ENCOUNTER
THE [facility] for patient to [facility] and confirm appointment with Dr Da Pulido  Gave direct number in [facility]

## 2021-09-28 ENCOUNTER — CONSULT (OUTPATIENT)
Dept: NEUROLOGY | Facility: CLINIC | Age: 46
End: 2021-09-28
Payer: COMMERCIAL

## 2021-09-28 VITALS
DIASTOLIC BLOOD PRESSURE: 90 MMHG | HEART RATE: 109 BPM | HEIGHT: 59 IN | BODY MASS INDEX: 40.52 KG/M2 | WEIGHT: 201 LBS | SYSTOLIC BLOOD PRESSURE: 140 MMHG | TEMPERATURE: 97.3 F

## 2021-09-28 DIAGNOSIS — G43.909 MIGRAINE WITHOUT STATUS MIGRAINOSUS, NOT INTRACTABLE, UNSPECIFIED MIGRAINE TYPE: ICD-10-CM

## 2021-09-28 PROCEDURE — 99203 OFFICE O/P NEW LOW 30 MIN: CPT | Performed by: PSYCHIATRY & NEUROLOGY

## 2021-09-28 RX ORDER — AMITRIPTYLINE HYDROCHLORIDE 10 MG/1
10 TABLET, FILM COATED ORAL
Qty: 90 TABLET | Refills: 0 | Status: SHIPPED | OUTPATIENT
Start: 2021-09-28 | End: 2021-12-29

## 2021-09-28 RX ORDER — ZOLMITRIPTAN 5 MG/1
1 SPRAY NASAL AS NEEDED
Qty: 6 ML | Refills: 3 | Status: SHIPPED | OUTPATIENT
Start: 2021-09-28 | End: 2022-02-08

## 2021-10-06 DIAGNOSIS — G43.909 MIGRAINE WITHOUT STATUS MIGRAINOSUS, NOT INTRACTABLE, UNSPECIFIED MIGRAINE TYPE: ICD-10-CM

## 2021-10-07 RX ORDER — TOPIRAMATE 25 MG/1
TABLET ORAL
Qty: 120 TABLET | Refills: 0 | Status: SHIPPED | OUTPATIENT
Start: 2021-10-07 | End: 2022-02-08

## 2021-10-13 ENCOUNTER — HOSPITAL ENCOUNTER (OUTPATIENT)
Dept: MRI IMAGING | Facility: HOSPITAL | Age: 46
Discharge: HOME | End: 2021-10-13
Payer: COMMERCIAL

## 2021-10-13 DIAGNOSIS — G43.909 MIGRAINE WITHOUT STATUS MIGRAINOSUS, NOT INTRACTABLE, UNSPECIFIED MIGRAINE TYPE: ICD-10-CM

## 2021-10-13 PROCEDURE — 70553 MRI BRAIN STEM W/O & W/DYE: CPT

## 2021-10-13 PROCEDURE — G1004 CDSM NDSC: HCPCS

## 2021-10-13 PROCEDURE — A9585 GADOBUTROL INJECTION: HCPCS | Performed by: PSYCHIATRY & NEUROLOGY

## 2021-10-13 RX ADMIN — GADOBUTROL 9 ML: 604.72 INJECTION INTRAVENOUS at 09:24

## 2021-11-02 ENCOUNTER — IMMUNIZATIONS (OUTPATIENT)
Dept: FAMILY MEDICINE CLINIC | Facility: CLINIC | Age: 46
End: 2021-11-02
Payer: COMMERCIAL

## 2021-11-02 DIAGNOSIS — Z23 ENCOUNTER FOR IMMUNIZATION: Primary | ICD-10-CM

## 2021-11-02 PROCEDURE — 90686 IIV4 VACC NO PRSV 0.5 ML IM: CPT | Performed by: FAMILY MEDICINE

## 2021-11-02 PROCEDURE — 90471 IMMUNIZATION ADMIN: CPT | Performed by: FAMILY MEDICINE

## 2021-11-06 ENCOUNTER — APPOINTMENT (OUTPATIENT)
Dept: LAB | Age: 46
End: 2021-11-06
Payer: COMMERCIAL

## 2021-11-06 DIAGNOSIS — E11.9 CONTROLLED TYPE 2 DIABETES MELLITUS WITHOUT COMPLICATION, WITHOUT LONG-TERM CURRENT USE OF INSULIN (HCC): ICD-10-CM

## 2021-11-06 DIAGNOSIS — Z11.4 SCREENING FOR HIV (HUMAN IMMUNODEFICIENCY VIRUS): ICD-10-CM

## 2021-11-06 LAB
ALBUMIN SERPL BCP-MCNC: 3.3 G/DL (ref 3.5–5)
ALP SERPL-CCNC: 87 U/L (ref 46–116)
ALT SERPL W P-5'-P-CCNC: 13 U/L (ref 12–78)
ANION GAP SERPL CALCULATED.3IONS-SCNC: 1 MMOL/L (ref 4–13)
AST SERPL W P-5'-P-CCNC: 7 U/L (ref 5–45)
BILIRUB SERPL-MCNC: 0.29 MG/DL (ref 0.2–1)
BUN SERPL-MCNC: 11 MG/DL (ref 5–25)
CALCIUM ALBUM COR SERPL-MCNC: 10.1 MG/DL (ref 8.3–10.1)
CALCIUM SERPL-MCNC: 9.5 MG/DL (ref 8.3–10.1)
CHLORIDE SERPL-SCNC: 110 MMOL/L (ref 100–108)
CHOLEST SERPL-MCNC: 215 MG/DL (ref 50–200)
CO2 SERPL-SCNC: 25 MMOL/L (ref 21–32)
CREAT SERPL-MCNC: 0.78 MG/DL (ref 0.6–1.3)
CREAT UR-MCNC: 86.1 MG/DL
EST. AVERAGE GLUCOSE BLD GHB EST-MCNC: 137 MG/DL
GFR SERPL CREATININE-BSD FRML MDRD: 93 ML/MIN/1.73SQ M
GLUCOSE P FAST SERPL-MCNC: 118 MG/DL (ref 65–99)
HBA1C MFR BLD: 6.4 %
HDLC SERPL-MCNC: 34 MG/DL
LDLC SERPL CALC-MCNC: 150 MG/DL (ref 0–100)
MICROALBUMIN UR-MCNC: 40.1 MG/L (ref 0–20)
MICROALBUMIN/CREAT 24H UR: 47 MG/G CREATININE (ref 0–30)
NONHDLC SERPL-MCNC: 181 MG/DL
POTASSIUM SERPL-SCNC: 5 MMOL/L (ref 3.5–5.3)
PROT SERPL-MCNC: 7.4 G/DL (ref 6.4–8.2)
SODIUM SERPL-SCNC: 136 MMOL/L (ref 136–145)
TRIGL SERPL-MCNC: 153 MG/DL
TSH SERPL DL<=0.05 MIU/L-ACNC: 2.06 UIU/ML (ref 0.36–3.74)

## 2021-11-06 PROCEDURE — 80061 LIPID PANEL: CPT

## 2021-11-06 PROCEDURE — 3044F HG A1C LEVEL LT 7.0%: CPT | Performed by: FAMILY MEDICINE

## 2021-11-06 PROCEDURE — 87389 HIV-1 AG W/HIV-1&-2 AB AG IA: CPT

## 2021-11-06 PROCEDURE — 82570 ASSAY OF URINE CREATININE: CPT | Performed by: FAMILY MEDICINE

## 2021-11-06 PROCEDURE — 3060F POS MICROALBUMINURIA REV: CPT | Performed by: FAMILY MEDICINE

## 2021-11-06 PROCEDURE — 82043 UR ALBUMIN QUANTITATIVE: CPT | Performed by: FAMILY MEDICINE

## 2021-11-06 PROCEDURE — 83036 HEMOGLOBIN GLYCOSYLATED A1C: CPT

## 2021-11-06 PROCEDURE — 80053 COMPREHEN METABOLIC PANEL: CPT

## 2021-11-06 PROCEDURE — 36415 COLL VENOUS BLD VENIPUNCTURE: CPT

## 2021-11-06 PROCEDURE — 84443 ASSAY THYROID STIM HORMONE: CPT

## 2021-11-07 LAB — HIV 1+2 AB+HIV1 P24 AG SERPL QL IA: NORMAL

## 2021-11-10 ENCOUNTER — OFFICE VISIT (OUTPATIENT)
Dept: FAMILY MEDICINE CLINIC | Facility: CLINIC | Age: 46
End: 2021-11-10
Payer: COMMERCIAL

## 2021-11-10 VITALS
DIASTOLIC BLOOD PRESSURE: 80 MMHG | HEIGHT: 58 IN | SYSTOLIC BLOOD PRESSURE: 108 MMHG | RESPIRATION RATE: 16 BRPM | BODY MASS INDEX: 40.47 KG/M2 | TEMPERATURE: 97.7 F | WEIGHT: 192.8 LBS | HEART RATE: 98 BPM | OXYGEN SATURATION: 97 %

## 2021-11-10 DIAGNOSIS — F43.9 STRESS AT HOME: ICD-10-CM

## 2021-11-10 DIAGNOSIS — E78.2 MIXED HYPERLIPIDEMIA: ICD-10-CM

## 2021-11-10 DIAGNOSIS — Z72.0 TOBACCO USE: ICD-10-CM

## 2021-11-10 DIAGNOSIS — E66.01 MORBID OBESITY (HCC): ICD-10-CM

## 2021-11-10 DIAGNOSIS — E11.9 CONTROLLED TYPE 2 DIABETES MELLITUS WITHOUT COMPLICATION, WITHOUT LONG-TERM CURRENT USE OF INSULIN (HCC): Primary | ICD-10-CM

## 2021-11-10 DIAGNOSIS — J45.40 MODERATE PERSISTENT ASTHMA WITHOUT COMPLICATION: ICD-10-CM

## 2021-11-10 PROCEDURE — 3008F BODY MASS INDEX DOCD: CPT | Performed by: FAMILY MEDICINE

## 2021-11-10 PROCEDURE — 99214 OFFICE O/P EST MOD 30 MIN: CPT | Performed by: FAMILY MEDICINE

## 2021-11-10 RX ORDER — MELATONIN
1000 DAILY
COMMUNITY

## 2021-11-20 DIAGNOSIS — J45.40 MODERATE PERSISTENT ASTHMA WITHOUT COMPLICATION: ICD-10-CM

## 2021-11-24 DIAGNOSIS — K21.9 GASTROESOPHAGEAL REFLUX DISEASE: ICD-10-CM

## 2021-11-24 DIAGNOSIS — J30.2 SEASONAL ALLERGIES: ICD-10-CM

## 2021-11-24 DIAGNOSIS — J45.40 MODERATE PERSISTENT ASTHMA WITHOUT COMPLICATION: ICD-10-CM

## 2021-11-24 RX ORDER — MONTELUKAST SODIUM 10 MG/1
TABLET ORAL
Qty: 30 TABLET | Refills: 4 | Status: SHIPPED | OUTPATIENT
Start: 2021-11-24 | End: 2022-05-06

## 2021-11-24 RX ORDER — OMEPRAZOLE 20 MG/1
CAPSULE, DELAYED RELEASE ORAL
Qty: 30 CAPSULE | Refills: 4 | Status: SHIPPED | OUTPATIENT
Start: 2021-11-24 | End: 2022-05-06

## 2021-12-16 DIAGNOSIS — H10.12 ALLERGIC CONJUNCTIVITIS OF LEFT EYE: ICD-10-CM

## 2021-12-18 RX ORDER — ALCAFTADINE 0.25 %
1 DROPS OPHTHALMIC (EYE) DAILY PRN
Qty: 3 ML | Refills: 1 | Status: SHIPPED | OUTPATIENT
Start: 2021-12-18

## 2021-12-29 DIAGNOSIS — G43.909 MIGRAINE WITHOUT STATUS MIGRAINOSUS, NOT INTRACTABLE, UNSPECIFIED MIGRAINE TYPE: ICD-10-CM

## 2021-12-29 RX ORDER — AMITRIPTYLINE HYDROCHLORIDE 10 MG/1
TABLET, FILM COATED ORAL
Qty: 30 TABLET | Refills: 2 | Status: SHIPPED | OUTPATIENT
Start: 2021-12-29 | End: 2021-12-31

## 2021-12-31 DIAGNOSIS — G43.909 MIGRAINE WITHOUT STATUS MIGRAINOSUS, NOT INTRACTABLE, UNSPECIFIED MIGRAINE TYPE: ICD-10-CM

## 2021-12-31 RX ORDER — AMITRIPTYLINE HYDROCHLORIDE 10 MG/1
TABLET, FILM COATED ORAL
Qty: 30 TABLET | Refills: 2 | Status: SHIPPED | OUTPATIENT
Start: 2021-12-31 | End: 2022-02-08

## 2022-01-29 DIAGNOSIS — J45.40 MODERATE PERSISTENT ASTHMA WITHOUT COMPLICATION: ICD-10-CM

## 2022-02-01 ENCOUNTER — TELEPHONE (OUTPATIENT)
Dept: NEUROLOGY | Facility: CLINIC | Age: 47
End: 2022-02-01

## 2022-02-08 ENCOUNTER — TELEMEDICINE (OUTPATIENT)
Dept: NEUROLOGY | Facility: CLINIC | Age: 47
End: 2022-02-08
Payer: COMMERCIAL

## 2022-02-08 DIAGNOSIS — G43.909 MIGRAINE WITHOUT STATUS MIGRAINOSUS, NOT INTRACTABLE, UNSPECIFIED MIGRAINE TYPE: ICD-10-CM

## 2022-02-08 PROCEDURE — 99213 OFFICE O/P EST LOW 20 MIN: CPT | Performed by: PSYCHIATRY & NEUROLOGY

## 2022-02-08 RX ORDER — ZOLMITRIPTAN 5 MG/1
TABLET, FILM COATED ORAL
Qty: 5 TABLET | Refills: 3 | Status: SHIPPED | OUTPATIENT
Start: 2022-02-08 | End: 2022-07-27 | Stop reason: SDUPTHER

## 2022-02-08 RX ORDER — TOPIRAMATE 25 MG/1
75 TABLET ORAL
Qty: 90 TABLET | Refills: 3 | Status: SHIPPED | OUTPATIENT
Start: 2022-02-08 | End: 2022-06-04

## 2022-02-08 RX ORDER — TOPIRAMATE 25 MG/1
50 TABLET ORAL 2 TIMES DAILY
Qty: 120 TABLET | Refills: 0 | Status: CANCELLED | OUTPATIENT
Start: 2022-02-08

## 2022-02-08 RX ORDER — AMITRIPTYLINE HYDROCHLORIDE 10 MG/1
20 TABLET, FILM COATED ORAL
Qty: 60 TABLET | Refills: 2 | Status: SHIPPED | OUTPATIENT
Start: 2022-02-08 | End: 2022-05-11

## 2022-02-08 NOTE — ASSESSMENT & PLAN NOTE
This is a 42 y o  female presenting in follow up for migraine headaches  In the interim, patient had an MRI brain which was normal  At last visit, plan to wean off of Topamax and start Amitriptyline  Also sent in Rx for Zomig nasal spray which unfortunately was not covered  She was able to wean down Topamax to 75 mg at bedtime and continues to take Amitriptyline 10 mg at bedtime  Currently having approximately 3 migraines per month, down from 5 at last visit      Plan:  - Will continue Topamax at 75 mg at bedtime  - Increase Amitriptyline to 20 mg at bedtime  - As Zomig nasal spray was not covered, sent in new rx for PO Zomig  - Continue to keep headache diary  - Follow up in 4 months

## 2022-02-08 NOTE — PATIENT INSTRUCTIONS
Increase amitriptyline to 20 mg at bedtime (2 tablets)  Continue Topamax at 75 mg at bedtime (3 tablets)  Sent in new prescription for Zomig pill  Take 1 at onset of headache  Max 2 per week   If this is not covered, please let me know no lesions,  no deformities,  no traumatic injuries,  no significant scars are present,  chest wall non-tender,  no masses present, breathing is unlabored without accessory muscle use, normal breath sounds

## 2022-02-08 NOTE — PROGRESS NOTES
Virtual Regular Visit    Verification of patient location:    Patient is located in the following state in which I hold an active license [place]      Assessment/Plan:    Problem List Items Addressed This Visit        Cardiovascular and Mediastinum    Migraine without status migrainosus, not intractable     This is a 42 y o  female presenting in follow up for migraine headaches  In the interim, patient had an MRI brain which was normal  At last visit, plan to wean off of Topamax and start Amitriptyline  Also sent in Rx for Zomig nasal spray which unfortunately was not covered  She was able to wean down Topamax to 75 mg at bedtime and continues to take Amitriptyline 10 mg at bedtime  Currently having approximately 3 migraines per month, down from 5 at last visit  Plan:  - Will continue Topamax at 75 mg at bedtime  - Increase Amitriptyline to 20 mg at bedtime  - As Zomig nasal spray was not covered, sent in new rx for PO Zomig  - Continue to keep headache diary  - Follow up in 4 months         Relevant Medications    amitriptyline (ELAVIL) 10 mg tablet    ZOLMitriptan (Zomig) 5 MG tablet    topiramate (TOPAMAX) 25 mg tablet           Reason for visit is   Chief Complaint   Patient presents with    Virtual Regular Visit      Encounter provider Kailyn Linares DO    Provider located at [place] [address] [place] [address]  [phone]      Recent Visits  Date Type Provider Dept   02/01/22 Telephone [facility] recent visits within past 7 days and meeting all other requirements  Today's Visits  Date Type Provider Dept   02/08/22 Telemedicine Kailyn Linares DO  Neuro [facility]   Showing today's visits and meeting all other requirements  Future Appointments  No visits were found meeting these conditions    Showing future appointments within next 150 days and meeting all other requirements       The patient was identified by name and date of birth  León Pathak was informed that this is a telemedicine visit and that the visit is being conducted through [facility] and patient was informed this is a secure, HIPAA-complaint platform  She agrees to proceed     My office door was closed  The patient was notified the following individuals were present in the room - Dr Blanquita Damon, attending  She acknowledged consent and understanding of privacy and security of the video platform  The patient has agreed to participate and understands they can discontinue the visit at any time  Patient is aware this is a billable service  Subjective  León Pathak is a 42 y o  female who presents in follow up for migraine headaches  She was last seen in the office on 9/28/21  At last visit, patient reported that she was having approximately 5 migraines per month  She wanted to try to stop Topamax as she was on a high dose and it was ineffective  We started a wean and patient was able to decrease to 75 mg daily at bedtime with stability in her headache frequency  When she tried to decrease to 50 mg her headaches worsened  She started taking the amitriptyline at 10 mg and has noticed a benefit with her headaches  She is interested in increasing the dose today  Currently she is getting 3 headaches per month  Unfortunately the Zomig nasal spray was not covered by her insurance  She is currently using Aleve only as an abortive which helps but not as much as triptans  She would like to try Zomig PO and if that is not covered she would then want to go back to Rizatriptan  She denies any new or worsening features of her headaches  Has otherwise been in good health with no new concerns        Past Medical History:   Diagnosis Date    Allergic     Asthma     Migraines        Past Surgical History:   Procedure Laterality Date    HERNIA REPAIR         Current Outpatient Medications   Medication Sig Dispense Refill    Advair Diskus 250-50 MCG/DOSE inhaler INHALE 1 PUFF BY MOUTH TWICE A DAY 60 blister 1    albuterol (2 5 mg/3 mL) 0 083 % nebulizer solution Take 1 vial (2 5 mg total) by nebulization every 4 (four) hours as needed for wheezing 25 vial 3    albuterol (Ventolin HFA) 90 mcg/act inhaler Inhale 1-2 puffs every 4 (four) hours as needed for wheezing 1 Inhaler 2    Alcaftadine (Lastacaft) 0 25 % SOLN Administer 1 drop to both eyes daily as needed (allergic eye symptoms) 3 mL 1    amitriptyline (ELAVIL) 10 mg tablet Take 2 tablets (20 mg total) by mouth daily at bedtime 60 tablet 2    cetirizine (ZyrTEC) 10 mg tablet Take 1 tablet (10 mg total) by mouth daily 30 tablet 5    cholecalciferol (VITAMIN D3) 1,000 units tablet Take 1,000 Units by mouth daily      fluticasone (FLONASE) 50 mcg/act nasal spray 1 spray into each nostril daily 1 Bottle 5    montelukast (SINGULAIR) 10 mg tablet TAKE 1 TABLET BY MOUTH DAILY AT BEDTIME 30 tablet 4    nystatin (MYCOSTATIN) powder Apply topically 3 (three) times a day 45 g 1    omeprazole (PriLOSEC) 20 mg delayed release capsule TAKE 1 CAPSULE BY MOUTH EVERY DAY 30 capsule 4    topiramate (TOPAMAX) 25 mg tablet Take 3 tablets (75 mg total) by mouth daily at bedtime 90 tablet 3    naphazoline-pheniramine (NAPHCON-A) 0 025-0 3 % ophthalmic solution Apply 1 drop to eye 2 (two) times a day (Patient not taking: Reported on 2/8/2022 )      ZOLMitriptan (Zomig) 5 MG tablet Take 1 tablet at onset of headache  Max 2 tablets per week  5 tablet 3     No current facility-administered medications for this visit  Allergies   Allergen Reactions    Aspirin     Cephalexin        Review of Systems   Constitutional: Negative  Negative for appetite change and fever  HENT: Negative  Negative for hearing loss, tinnitus, trouble swallowing and voice change  Eyes: Negative  Negative for photophobia and pain  Respiratory: Negative  Negative for shortness of breath      Cardiovascular: Negative  Negative for palpitations  Gastrointestinal: Negative  Negative for nausea and vomiting  Endocrine: Negative  Negative for cold intolerance  Genitourinary: Negative  Negative for dysuria, frequency and urgency  Musculoskeletal: Negative  Negative for myalgias and neck pain  Skin: Negative  Negative for rash  Neurological: Negative  Negative for dizziness, tremors, seizures, syncope, facial asymmetry, speech difficulty, weakness, light-headedness, numbness and headaches  Hematological: Negative  Does not bruise/bleed easily  Psychiatric/Behavioral: Negative  Negative for confusion, hallucinations and sleep disturbance  Video Exam    There were no vitals filed for this visit  Physical Exam  Nursing note reviewed  Constitutional:       General: She is not in acute distress  Appearance: Normal appearance  She is obese  HENT:      Head: Normocephalic and atraumatic  Nose: Nose normal    Eyes:      Extraocular Movements: Extraocular movements intact  Conjunctiva/sclera: Conjunctivae normal    Musculoskeletal:      Cervical back: Normal range of motion  Neurological:      Mental Status: She is alert and oriented to person, place, and time  Cranial Nerves: Cranial nerves are intact  Motor: Motor function is intact  Coordination: Coordination is intact  Psychiatric:         Mood and Affect: Mood normal          Behavior: Behavior normal           I spent 15 minutes directly with the patient during this visit    VIRTUAL VISIT [address] [place] [address] verbally agrees to participate in [employer]  Pt is aware that [employer] could be limited without vital signs or the ability to perform a full hands-on physical exam  Iris Crabtree understands she or the provider may request at any time to terminate the video visit and request the patient to seek care or treatment in person

## 2022-03-29 DIAGNOSIS — J45.40 MODERATE PERSISTENT ASTHMA WITHOUT COMPLICATION: ICD-10-CM

## 2022-05-06 DIAGNOSIS — Z12.31 SCREENING MAMMOGRAM, ENCOUNTER FOR: Primary | ICD-10-CM

## 2022-05-06 DIAGNOSIS — J45.40 MODERATE PERSISTENT ASTHMA WITHOUT COMPLICATION: ICD-10-CM

## 2022-05-06 DIAGNOSIS — J30.2 SEASONAL ALLERGIES: ICD-10-CM

## 2022-05-06 DIAGNOSIS — K21.9 GASTROESOPHAGEAL REFLUX DISEASE: ICD-10-CM

## 2022-05-06 RX ORDER — OMEPRAZOLE 20 MG/1
CAPSULE, DELAYED RELEASE ORAL
Qty: 90 CAPSULE | Refills: 1 | Status: SHIPPED | OUTPATIENT
Start: 2022-05-06 | End: 2022-07-27 | Stop reason: SDUPTHER

## 2022-05-06 RX ORDER — MONTELUKAST SODIUM 10 MG/1
TABLET ORAL
Qty: 90 TABLET | Refills: 1 | Status: SHIPPED | OUTPATIENT
Start: 2022-05-06 | End: 2022-07-27 | Stop reason: SDUPTHER

## 2022-05-07 DIAGNOSIS — G43.909 MIGRAINE WITHOUT STATUS MIGRAINOSUS, NOT INTRACTABLE, UNSPECIFIED MIGRAINE TYPE: ICD-10-CM

## 2022-05-11 DIAGNOSIS — G43.909 MIGRAINE WITHOUT STATUS MIGRAINOSUS, NOT INTRACTABLE, UNSPECIFIED MIGRAINE TYPE: ICD-10-CM

## 2022-05-11 RX ORDER — AMITRIPTYLINE HYDROCHLORIDE 10 MG/1
20 TABLET, FILM COATED ORAL
Qty: 180 TABLET | Refills: 3 | Status: SHIPPED | OUTPATIENT
Start: 2022-05-11 | End: 2022-07-27 | Stop reason: SDUPTHER

## 2022-06-04 RX ORDER — TOPIRAMATE 25 MG/1
75 TABLET ORAL
Qty: 270 TABLET | Refills: 1 | Status: SHIPPED | OUTPATIENT
Start: 2022-06-04 | End: 2022-07-27 | Stop reason: SDUPTHER

## 2022-06-08 ENCOUNTER — HOSPITAL ENCOUNTER (OUTPATIENT)
Dept: MAMMOGRAPHY | Facility: HOSPITAL | Age: 47
Discharge: HOME | End: 2022-06-08
Payer: COMMERCIAL

## 2022-06-08 VITALS — WEIGHT: 192 LBS | HEIGHT: 58 IN | BODY MASS INDEX: 40.3 KG/M2

## 2022-06-08 DIAGNOSIS — Z12.31 SCREENING MAMMOGRAM, ENCOUNTER FOR: ICD-10-CM

## 2022-06-08 PROCEDURE — 77063 BREAST TOMOSYNTHESIS BI: CPT

## 2022-06-08 PROCEDURE — 77067 SCR MAMMO BI INCL CAD: CPT

## 2022-07-06 ENCOUNTER — OFFICE VISIT (OUTPATIENT)
Dept: FAMILY MEDICINE CLINIC | Facility: CLINIC | Age: 47
End: 2022-07-06
Payer: COMMERCIAL

## 2022-07-06 VITALS
BODY MASS INDEX: 42.07 KG/M2 | DIASTOLIC BLOOD PRESSURE: 98 MMHG | TEMPERATURE: 97.8 F | SYSTOLIC BLOOD PRESSURE: 142 MMHG | HEIGHT: 58 IN | RESPIRATION RATE: 16 BRPM | WEIGHT: 200.4 LBS | HEART RATE: 96 BPM | OXYGEN SATURATION: 96 %

## 2022-07-06 DIAGNOSIS — E11.9 CONTROLLED TYPE 2 DIABETES MELLITUS WITHOUT COMPLICATION, WITHOUT LONG-TERM CURRENT USE OF INSULIN (HCC): Primary | ICD-10-CM

## 2022-07-06 DIAGNOSIS — Z12.4 SCREENING FOR CERVICAL CANCER: ICD-10-CM

## 2022-07-06 DIAGNOSIS — J45.40 MODERATE PERSISTENT ASTHMA WITHOUT COMPLICATION: ICD-10-CM

## 2022-07-06 DIAGNOSIS — E66.01 MORBID OBESITY (HCC): ICD-10-CM

## 2022-07-06 DIAGNOSIS — R03.0 ELEVATED BLOOD PRESSURE READING WITHOUT DIAGNOSIS OF HYPERTENSION: ICD-10-CM

## 2022-07-06 DIAGNOSIS — Z23 ENCOUNTER FOR IMMUNIZATION: ICD-10-CM

## 2022-07-06 LAB — SL AMB POCT HEMOGLOBIN AIC: 6.7 (ref ?–6.5)

## 2022-07-06 PROCEDURE — 83036 HEMOGLOBIN GLYCOSYLATED A1C: CPT | Performed by: FAMILY MEDICINE

## 2022-07-06 PROCEDURE — 3077F SYST BP >= 140 MM HG: CPT | Performed by: FAMILY MEDICINE

## 2022-07-06 PROCEDURE — 90471 IMMUNIZATION ADMIN: CPT

## 2022-07-06 PROCEDURE — 90677 PCV20 VACCINE IM: CPT

## 2022-07-06 PROCEDURE — 99214 OFFICE O/P EST MOD 30 MIN: CPT | Performed by: FAMILY MEDICINE

## 2022-07-06 PROCEDURE — 3080F DIAST BP >= 90 MM HG: CPT | Performed by: FAMILY MEDICINE

## 2022-07-06 PROCEDURE — 3044F HG A1C LEVEL LT 7.0%: CPT | Performed by: FAMILY MEDICINE

## 2022-07-06 PROCEDURE — 3725F SCREEN DEPRESSION PERFORMED: CPT | Performed by: FAMILY MEDICINE

## 2022-07-06 NOTE — PROGRESS NOTES
Diabetic Foot Exam    Patient's shoes and socks removed  Right Foot/Ankle   Right Foot Inspection  Skin Exam: skin normal, skin intact, callus and callus  No dry skin, no warmth, no erythema, no maceration, no abnormal color, no pre-ulcer and no ulcer  Toe Exam: ROM and strength within normal limits  Sensory   Monofilament testing: intact    Vascular  Capillary refills: < 3 seconds  The right DP pulse is 2+  The right PT pulse is 2+  Left Foot/Ankle  Left Foot Inspection  Skin Exam: skin normal, skin intact and callus  No dry skin, no warmth, no erythema, no maceration, normal color, no pre-ulcer and no ulcer  Toe Exam: ROM and strength within normal limits  Sensory   Monofilament testing: intact    Vascular  Capillary refills: < 3 seconds  The left DP pulse is 2+  The left PT pulse is 2+       Assign Risk Category  No deformity present  No loss of protective sensation  No weak pulses  Risk: 0

## 2022-07-06 NOTE — PATIENT INSTRUCTIONS
10% - bad control"> 10% - bad control,Hemoglobin A1c (HbA1c) greater than 10% indicating poor diabetic control,Haemoglobin A1c greater than 10% indicating poor diabetic control">   Diabetes Mellitus Type 2 in Adults, Ambulatory Care   GENERAL INFORMATION:   Diabetes mellitus type 2  is a disease that affects how your body uses glucose (sugar)  Insulin helps move sugar out of the blood so it can be used for energy  Normally, when the blood sugar level increases, the pancreas makes more insulin  Type 2 diabetes develops because either the body cannot make enough insulin, or it cannot use the insulin correctly  After many years, your pancreas may stop making insulin  Common symptoms include the following:   · More hunger or thirst than usual     · Frequent urination     · Weight loss without trying     · Blurred vision  Seek immediate care for the following symptoms:   · Severe abdominal pain, or pain that spreads to your back  You may also be vomiting  · Trouble staying awake or focusing    · Shaking or sweating    · Blurred or double vision    · Breath has a fruity, sweet smell    · Breathing is deep and labored, or rapid and shallow    · Heartbeat is fast and weak  Treatment for diabetes mellitus type 2  includes keeping your blood sugar at a normal level  You must eat the right foods, and exercise regularly  You may also need medicine if you cannot control your blood sugar level with nutrition and exercise  Manage diabetes mellitus type 2:   · Check your blood sugar level  You will be taught how to check a small drop of blood in a glucose monitor  Ask your healthcare provider when and how often to check during the day  Ask your healthcare provider what your blood sugar levels should be when you check them  · Keep track of carbohydrates (sugar and starchy foods)  Your blood sugar level can get too high if you eat too many carbohydrates   Your dietitian will help you plan meals and snacks that have the right amount of carbohydrates  · Eat low-fat foods  Some examples are skinless chicken and low-fat milk  · Eat less sodium (salt)  Some examples of high-sodium foods to limit are soy sauce, potato chips, and soup  Do not add salt to food you cook  Limit your use of table salt  · Eat high-fiber foods  Foods that are a good source of fiber include vegetables, whole grain bread, and beans  · Limit alcohol  Alcohol affects your blood sugar level and can make it harder to manage your diabetes  Women should limit alcohol to 1 drink a day  Men should limit alcohol to 2 drinks a day  A drink of alcohol is 12 ounces of beer, 5 ounces of wine, or 1½ ounces of liquor  · Get regular exercise  Exercise can help keep your blood sugar level steady, decrease your risk of heart disease, and help you lose weight  Exercise for at least 30 minutes, 5 days a week  Include muscle strengthening activities 2 days each week  Work with your healthcare provider to create an exercise plan  · Check your feet each day  for injuries or open sores  Ask your healthcare provider for activities you can do if you have an open sore  · Quit smoking  If you smoke, it is never too late to quit  Smoking can worsen the problems that may occur with diabetes  Ask your healthcare provider for information about how to stop smoking if you are having trouble quitting  · Ask about your weight:  Ask healthcare providers if you need to lose weight, and how much to lose  Ask them to help you with a weight loss program  Even a 10 to 15 pound weight loss can help you manage your blood sugar level  · Carry medical alert identification  Wear medical alert jewelry or carry a card that says you have diabetes  Ask your healthcare provider where to get these items  · Ask about vaccines  Diabetes puts you at risk of serious illness if you get the flu, pneumonia, or hepatitis   Ask your healthcare provider if you should get a flu, pneumonia, or hepatitis B vaccine, and when to get the vaccine  Follow up with your healthcare provider as directed:  Write down your questions so you remember to ask them during your visits  CARE AGREEMENT:   You have the right to help plan your care  Learn about your health condition and how it may be treated  Discuss treatment options with your caregivers to decide what care you want to receive  You always have the right to refuse treatment  The above information is an  only  It is not intended as medical advice for individual conditions or treatments  Talk to your doctor, nurse or pharmacist before following any medical regimen to see if it is safe and effective for you  © 2014 [address] is for End User's use only and may not be sold, redistributed or otherwise used for commercial purposes  All illustrations and images included in CareNotes® are the copyrighted property of A D A M [employer]  or Kasi Bell

## 2022-07-08 NOTE — PROGRESS NOTES
Assessment/Plan:  Patient with uncontrolled blood pressure today  Asymptomatic  Have asked her to check blood pressures twice weekly and call back in 1 week  Discussed starting ARB due to history of diabetes  A1c 6 7  Morbidly obese risk factor modification with regards to this  Eventually will need statin  Asthma stable  Pneumococcal 20 given today  Recheck 6 months with repeat labs at that time  No problem-specific Assessment & Plan notes found for this encounter  Diagnoses and all orders for this visit:    Controlled type 2 diabetes mellitus without complication, without long-term current use of insulin (HCC)  -     POCT hemoglobin A1c  -     Hemoglobin A1C; Future  -     Comprehensive metabolic panel; Future  -     Microalbumin / creatinine urine ratio; Future  -     Lipid Panel with Direct LDL reflex; Future  -     TSH, 3rd generation with Free T4 reflex; Future    Morbid obesity (HCC)    Moderate persistent asthma without complication    Elevated blood pressure reading without diagnosis of hypertension    Encounter for immunization  -     Pneumococcal Conjugate Vaccine 20-valent (PCV20)    Screening for cervical cancer  -     Ambulatory referral to Obstetrics / Gynecology; Future        1  Controlled type 2 diabetes mellitus without complication, without long-term current use of insulin (HCC)  POCT hemoglobin A1c    Hemoglobin A1C    Comprehensive metabolic panel    Microalbumin / creatinine urine ratio    Lipid Panel with Direct LDL reflex    TSH, 3rd generation with Free T4 reflex   2  Morbid obesity ([address] )     3  Moderate persistent asthma without complication     4  Elevated blood pressure reading without diagnosis of hypertension     5  Encounter for immunization  Pneumococcal Conjugate Vaccine 20-valent (PCV20)   6  Screening for cervical cancer  Ambulatory referral to Obstetrics / Gynecology       Subjective:        Patient ID: Deysi Rosa is a 41 y o  female    Chief Complaint Patient presents with    Follow-up     6 mo re ck dm       Overdue recheck of diabetes and asthma  Overall fairly stable  Respiratory issues have been under control  Under lot of stress raising nephew  The following portions of the patient's history were reviewed and updated as appropriate: past medical history, past surgical history and problem list       Review of Systems   Constitutional: Negative for appetite change, fatigue, fever and unexpected weight change  HENT: Negative for congestion, ear pain, postnasal drip, rhinorrhea, sinus pressure, sinus pain and sore throat  Eyes: Negative for redness and visual disturbance  Respiratory: Negative for chest tightness and shortness of breath  Cardiovascular: Negative for chest pain, palpitations and leg swelling  Gastrointestinal: Negative for abdominal distention, abdominal pain, diarrhea and nausea  Endocrine: Negative for cold intolerance and heat intolerance  Genitourinary: Negative for dysuria and hematuria  Musculoskeletal: Negative for arthralgias, gait problem and myalgias  Skin: Negative for pallor and rash  Neurological: Negative for dizziness, tremors, weakness, light-headedness and headaches  Psychiatric/Behavioral: Negative for behavioral problems  The patient is not nervous/anxious  Objective:  /98 (BP Location: Left arm, Patient Position: Sitting, Cuff Size: Adult)   Pulse 96   Temp 97 8 °F (36 6 °C) (Temporal)   Resp 16   Ht 4' 10" (1 473 m)   Wt 90 9 kg (200 lb 6 4 oz)   SpO2 96%   BMI 41 88 kg/m²        Physical Exam  Vitals and nursing note reviewed  Constitutional:       General: She is not in acute distress  Appearance: Normal appearance  She is obese  She is not diaphoretic  HENT:      Head: Normocephalic and atraumatic  Eyes:      General: No scleral icterus  Conjunctiva/sclera: Conjunctivae normal       Pupils: Pupils are equal, round, and reactive to light     Neck: Thyroid: No thyromegaly  Cardiovascular:      Rate and Rhythm: Normal rate and regular rhythm  Pulses:           Carotid pulses are 0 on the right side and 0 on the left side  Heart sounds: Normal heart sounds  No murmur heard  Pulmonary:      Effort: Pulmonary effort is normal  No respiratory distress  Breath sounds: Normal breath sounds  No wheezing  Abdominal:      General: There is no distension  Palpations: Abdomen is soft  Musculoskeletal:      Cervical back: Normal range of motion and neck supple  Right lower leg: No edema  Left lower leg: No edema  Lymphadenopathy:      Cervical: No cervical adenopathy  Skin:     General: Skin is warm  Coloration: Skin is not pale  Neurological:      General: No focal deficit present  Mental Status: She is alert and oriented to person, place, and time  Cranial Nerves: No cranial nerve deficit  Deep Tendon Reflexes: Reflexes are normal and symmetric  Psychiatric:         Mood and Affect: Mood normal          Behavior: Behavior normal          Thought Content:  Thought content normal          Judgment: Judgment normal

## 2022-07-27 DIAGNOSIS — J30.2 SEASONAL ALLERGIES: ICD-10-CM

## 2022-07-27 DIAGNOSIS — K21.9 GASTROESOPHAGEAL REFLUX DISEASE: ICD-10-CM

## 2022-07-27 DIAGNOSIS — G43.909 MIGRAINE WITHOUT STATUS MIGRAINOSUS, NOT INTRACTABLE, UNSPECIFIED MIGRAINE TYPE: ICD-10-CM

## 2022-07-27 DIAGNOSIS — J45.40 MODERATE PERSISTENT ASTHMA WITHOUT COMPLICATION: ICD-10-CM

## 2022-07-27 RX ORDER — ALBUTEROL SULFATE 90 UG/1
1-2 AEROSOL, METERED RESPIRATORY (INHALATION) EVERY 4 HOURS PRN
Qty: 8.5 G | Refills: 1 | Status: SHIPPED | OUTPATIENT
Start: 2022-07-27

## 2022-07-27 RX ORDER — CETIRIZINE HYDROCHLORIDE 10 MG/1
10 TABLET ORAL DAILY
Qty: 90 TABLET | Refills: 1 | Status: SHIPPED | OUTPATIENT
Start: 2022-07-27 | End: 2023-01-09 | Stop reason: SDUPTHER

## 2022-07-27 RX ORDER — ZOLMITRIPTAN 5 MG/1
TABLET, FILM COATED ORAL
Qty: 5 TABLET | Refills: 3 | Status: SHIPPED | OUTPATIENT
Start: 2022-07-27 | End: 2023-01-09 | Stop reason: SDUPTHER

## 2022-07-27 RX ORDER — FLUTICASONE PROPIONATE 50 MCG
2 SPRAY, SUSPENSION (ML) NASAL DAILY
Qty: 9.9 ML | Refills: 5 | Status: SHIPPED | OUTPATIENT
Start: 2022-07-27 | End: 2022-08-22

## 2022-07-27 RX ORDER — AMITRIPTYLINE HYDROCHLORIDE 10 MG/1
20 TABLET, FILM COATED ORAL
Qty: 180 TABLET | Refills: 3 | Status: SHIPPED | OUTPATIENT
Start: 2022-07-27 | End: 2023-01-09 | Stop reason: SDUPTHER

## 2022-07-27 RX ORDER — TOPIRAMATE 25 MG/1
75 TABLET ORAL
Qty: 270 TABLET | Refills: 1 | Status: SHIPPED | OUTPATIENT
Start: 2022-07-27 | End: 2023-01-09 | Stop reason: SDUPTHER

## 2022-07-27 RX ORDER — MONTELUKAST SODIUM 10 MG/1
10 TABLET ORAL
Qty: 90 TABLET | Refills: 1 | Status: SHIPPED | OUTPATIENT
Start: 2022-07-27 | End: 2023-01-09 | Stop reason: SDUPTHER

## 2022-07-27 RX ORDER — OMEPRAZOLE 20 MG/1
20 CAPSULE, DELAYED RELEASE ORAL DAILY
Qty: 90 CAPSULE | Refills: 1 | Status: SHIPPED | OUTPATIENT
Start: 2022-07-27 | End: 2023-01-09 | Stop reason: SDUPTHER

## 2022-07-27 RX ORDER — ALBUTEROL SULFATE 2.5 MG/3ML
2.5 SOLUTION RESPIRATORY (INHALATION) EVERY 4 HOURS PRN
Qty: 75 ML | Refills: 1 | Status: SHIPPED | OUTPATIENT
Start: 2022-07-27

## 2023-01-09 ENCOUNTER — OFFICE VISIT (OUTPATIENT)
Dept: FAMILY MEDICINE CLINIC | Facility: CLINIC | Age: 48
End: 2023-01-09

## 2023-01-09 DIAGNOSIS — E66.01 MORBID OBESITY (HCC): ICD-10-CM

## 2023-01-09 DIAGNOSIS — J30.2 SEASONAL ALLERGIES: ICD-10-CM

## 2023-01-09 DIAGNOSIS — H10.12 ALLERGIC CONJUNCTIVITIS OF LEFT EYE: ICD-10-CM

## 2023-01-09 DIAGNOSIS — K21.9 GASTROESOPHAGEAL REFLUX DISEASE: ICD-10-CM

## 2023-01-09 DIAGNOSIS — J45.40 MODERATE PERSISTENT ASTHMA WITHOUT COMPLICATION: ICD-10-CM

## 2023-01-09 DIAGNOSIS — E55.9 VITAMIN D DEFICIENCY: ICD-10-CM

## 2023-01-09 DIAGNOSIS — E11.9 CONTROLLED TYPE 2 DIABETES MELLITUS WITHOUT COMPLICATION, WITHOUT LONG-TERM CURRENT USE OF INSULIN (HCC): Primary | ICD-10-CM

## 2023-01-09 DIAGNOSIS — B37.2 CANDIDIASIS, INTERTRIGO: ICD-10-CM

## 2023-01-09 DIAGNOSIS — Z12.4 SCREENING FOR CERVICAL CANCER: ICD-10-CM

## 2023-01-09 DIAGNOSIS — G43.909 MIGRAINE WITHOUT STATUS MIGRAINOSUS, NOT INTRACTABLE, UNSPECIFIED MIGRAINE TYPE: ICD-10-CM

## 2023-01-09 LAB — SL AMB POCT HEMOGLOBIN AIC: 7.2 (ref ?–6.5)

## 2023-01-11 VITALS
BODY MASS INDEX: 44.04 KG/M2 | SYSTOLIC BLOOD PRESSURE: 140 MMHG | DIASTOLIC BLOOD PRESSURE: 84 MMHG | WEIGHT: 209.8 LBS | RESPIRATION RATE: 16 BRPM | OXYGEN SATURATION: 96 % | HEIGHT: 58 IN | HEART RATE: 96 BPM | TEMPERATURE: 97.6 F

## 2023-01-11 RX ORDER — ZOLMITRIPTAN 5 MG/1
TABLET, FILM COATED ORAL
Qty: 5 TABLET | Refills: 3 | Status: SHIPPED | OUTPATIENT
Start: 2023-01-11

## 2023-01-11 RX ORDER — OMEPRAZOLE 20 MG/1
20 CAPSULE, DELAYED RELEASE ORAL DAILY
Qty: 90 CAPSULE | Refills: 1 | Status: SHIPPED | OUTPATIENT
Start: 2023-01-11

## 2023-01-11 RX ORDER — MELATONIN
1000 DAILY
Qty: 90 TABLET | Refills: 1 | Status: SHIPPED | OUTPATIENT
Start: 2023-01-11

## 2023-01-11 RX ORDER — FLUTICASONE PROPIONATE AND SALMETEROL 250; 50 UG/1; UG/1
1 POWDER RESPIRATORY (INHALATION) 2 TIMES DAILY
Qty: 60 BLISTER | Refills: 1 | Status: SHIPPED | OUTPATIENT
Start: 2023-01-11

## 2023-01-11 RX ORDER — TOPIRAMATE 25 MG/1
75 TABLET ORAL
Qty: 270 TABLET | Refills: 1 | Status: SHIPPED | OUTPATIENT
Start: 2023-01-11 | End: 2023-05-11

## 2023-01-11 RX ORDER — ALCAFTADINE 0.25 %
1 DROPS OPHTHALMIC (EYE) DAILY PRN
Qty: 3 ML | Refills: 1 | Status: SHIPPED | OUTPATIENT
Start: 2023-01-11

## 2023-01-11 RX ORDER — CETIRIZINE HYDROCHLORIDE 10 MG/1
10 TABLET ORAL DAILY
Qty: 90 TABLET | Refills: 1 | Status: SHIPPED | OUTPATIENT
Start: 2023-01-11

## 2023-01-11 RX ORDER — AMITRIPTYLINE HYDROCHLORIDE 10 MG/1
20 TABLET, FILM COATED ORAL
Qty: 180 TABLET | Refills: 3 | Status: SHIPPED | OUTPATIENT
Start: 2023-01-11

## 2023-01-11 RX ORDER — FLUTICASONE PROPIONATE 50 MCG
2 SPRAY, SUSPENSION (ML) NASAL DAILY
Qty: 48 ML | Refills: 2 | Status: SHIPPED | OUTPATIENT
Start: 2023-01-11

## 2023-01-11 RX ORDER — MONTELUKAST SODIUM 10 MG/1
10 TABLET ORAL
Qty: 90 TABLET | Refills: 1 | Status: SHIPPED | OUTPATIENT
Start: 2023-01-11

## 2023-01-11 RX ORDER — NYSTATIN 100000 [USP'U]/G
POWDER TOPICAL 3 TIMES DAILY
Qty: 45 G | Refills: 1 | Status: SHIPPED | OUTPATIENT
Start: 2023-01-11

## 2023-01-12 NOTE — PROGRESS NOTES
Assessment/Plan:BP not ideal  Major stress  Discussed adding ARB next OV  Mult refills rended  Asthma stable  A1c is 7 2   Sophia 3 mo w labs  Rec yr diab eye and foot eval  RFM re MO  Migraine stable  Overdue for gyn  To sched  No problem-specific Assessment & Plan notes found for this encounter  Diagnoses and all orders for this visit:    Controlled type 2 diabetes mellitus without complication, without long-term current use of insulin ([facility] [address] )  -     IRIS Diabetic eye exam  -     Basic metabolic panel; Future  -     Urine Microalbumin/creatinine ratio; Future  -     POCT hemoglobin A1c    Moderate persistent asthma without complication  -     Fluticasone-Salmeterol (Advair Diskus) 250-50 mcg/dose inhaler; Inhale 1 puff 2 (two) times a day Rinse mouth after use  -     montelukast (SINGULAIR) 10 mg tablet; Take 1 tablet (10 mg total) by mouth daily at bedtime    Migraine without status migrainosus, not intractable, unspecified migraine type  -     amitriptyline (ELAVIL) 10 mg tablet; Take 2 tablets (20 mg total) by mouth daily at bedtime  -     topiramate (TOPAMAX) 25 mg tablet; Take 3 tablets (75 mg total) by mouth daily at bedtime  -     ZOLMitriptan (Zomig) 5 MG tablet; Take 1 tablet at onset of headache  Max 2 tablets per week  Morbid obesity (HCC)    Allergic conjunctivitis of left eye  -     Alcaftadine (Lastacaft) 0 25 % SOLN; Administer 1 drop to both eyes daily as needed (allergic eye symptoms)    Seasonal allergies  -     cetirizine (ZyrTEC) 10 mg tablet; Take 1 tablet (10 mg total) by mouth daily  -     fluticasone (FLONASE) 50 mcg/act nasal spray; 2 sprays into each nostril daily  -     montelukast (SINGULAIR) 10 mg tablet; Take 1 tablet (10 mg total) by mouth daily at bedtime    Candidiasis, intertrigo  -     nystatin (MYCOSTATIN) powder; Apply topically 3 (three) times a day    Gastroesophageal reflux disease  -     omeprazole (PriLOSEC) 20 mg delayed release capsule;  Take 1 capsule (20 mg total) by mouth daily    Vitamin D deficiency  -     cholecalciferol (VITAMIN D3) 1,000 units tablet; Take 1 tablet (1,000 Units total) by mouth daily    Screening for cervical cancer  -     Cancel: Liquid-based pap, screening ([facility]); Future    Other orders  -     Cancel: Hepatitis C Antibody ([facility]); Future        1  Controlled type 2 diabetes mellitus without complication, without long-term current use of insulin (HCC)  IRIS Diabetic eye exam    Basic metabolic panel    Urine Microalbumin/creatinine ratio    POCT hemoglobin A1c      2  Moderate persistent asthma without complication  Fluticasone-Salmeterol (Advair Diskus) 250-50 mcg/dose inhaler    montelukast (SINGULAIR) 10 mg tablet      3  Migraine without status migrainosus, not intractable, unspecified migraine type  amitriptyline (ELAVIL) 10 mg tablet    topiramate (TOPAMAX) 25 mg tablet    ZOLMitriptan (Zomig) 5 MG tablet      4  Morbid obesity ([address] )        5  Allergic conjunctivitis of left eye  Alcaftadine (Lastacaft) 0 25 % SOLN      6  Seasonal allergies  cetirizine (ZyrTEC) 10 mg tablet    fluticasone (FLONASE) 50 mcg/act nasal spray    montelukast (SINGULAIR) 10 mg tablet      7  Candidiasis, intertrigo  nystatin (MYCOSTATIN) powder      8  Gastroesophageal reflux disease  omeprazole (PriLOSEC) 20 mg delayed release capsule      9  Vitamin D deficiency  cholecalciferol (VITAMIN D3) 1,000 units tablet      10  Screening for cervical cancer  CANCELED: Liquid-based pap, screening ([facility])          Subjective:        Patient ID: Christ Ramírez is a 41 y o  female  Chief Complaint   Patient presents with   • Follow-up       21-year-old obese white female history of asthma migraines diabetes here for reevaluation  Did not get labs prior to  Under severe stress her nephew who she has had guardianship had to be put into a behavioral health hospital in [place]  This was just done        The following portions of the patient's history were reviewed and updated as appropriate: past medical history, past surgical history and problem list       Review of Systems   Constitutional: Negative for appetite change, fatigue, fever and unexpected weight change  HENT: Negative for congestion, ear pain, postnasal drip, rhinorrhea, sinus pressure, sinus pain and sore throat  Eyes: Negative for redness and visual disturbance  Respiratory: Negative for chest tightness and shortness of breath  Cardiovascular: Negative for chest pain, palpitations and leg swelling  Gastrointestinal: Negative for abdominal distention, abdominal pain, diarrhea and nausea  Endocrine: Negative for cold intolerance and heat intolerance  Genitourinary: Negative for dysuria and hematuria  Musculoskeletal: Negative for arthralgias, gait problem and myalgias  Skin: Negative for pallor and rash  Neurological: Negative for dizziness, tremors, weakness, light-headedness and headaches  Psychiatric/Behavioral: Negative for behavioral problems  The patient is nervous/anxious  Stress         Objective:  /84   Pulse 96   Temp 97 6 °F (36 4 °C) (Temporal)   Resp 16   Ht 4' 10" (1 473 m)   Wt 95 2 kg (209 lb 12 8 oz)   SpO2 96%   BMI 43 85 kg/m²        Physical Exam  Vitals and nursing note reviewed  Constitutional:       General: She is not in acute distress  Appearance: Normal appearance  She is obese  She is not ill-appearing  HENT:      Head: Normocephalic  Eyes:      General: No scleral icterus  Pupils: Pupils are equal, round, and reactive to light  Cardiovascular:      Rate and Rhythm: Normal rate and regular rhythm  Heart sounds: Normal heart sounds  No murmur heard  Pulmonary:      Breath sounds: Normal breath sounds  Abdominal:      General: There is no distension  Musculoskeletal:      Right lower leg: No edema  Left lower leg: No edema  Lymphadenopathy:      Cervical: No cervical adenopathy     Skin:     Coloration: Skin is not pale  Neurological:      General: No focal deficit present  Mental Status: She is alert and oriented to person, place, and time  Psychiatric:         Behavior: Behavior normal          Thought Content:  Thought content normal

## 2023-01-18 DIAGNOSIS — H10.13 ALLERGIC CONJUNCTIVITIS OF BOTH EYES: Primary | ICD-10-CM

## 2023-01-18 RX ORDER — AZELASTINE HYDROCHLORIDE 0.5 MG/ML
1 SOLUTION/ DROPS OPHTHALMIC 2 TIMES DAILY
Qty: 6 ML | Refills: 2 | Status: SHIPPED | OUTPATIENT
Start: 2023-01-18

## 2023-02-13 DIAGNOSIS — H10.13 ALLERGIC CONJUNCTIVITIS OF BOTH EYES: ICD-10-CM

## 2023-02-15 RX ORDER — AZELASTINE HYDROCHLORIDE 0.5 MG/ML
SOLUTION/ DROPS OPHTHALMIC
Qty: 18 ML | Refills: 1 | Status: SHIPPED | OUTPATIENT
Start: 2023-02-15

## 2023-03-22 DIAGNOSIS — J45.40 MODERATE PERSISTENT ASTHMA WITHOUT COMPLICATION: ICD-10-CM

## 2023-03-22 RX ORDER — FLUTICASONE PROPIONATE AND SALMETEROL 250; 50 UG/1; UG/1
POWDER RESPIRATORY (INHALATION)
Qty: 60 BLISTER | Refills: 1 | Status: SHIPPED | OUTPATIENT
Start: 2023-03-22

## 2023-04-29 DIAGNOSIS — G43.909 MIGRAINE WITHOUT STATUS MIGRAINOSUS, NOT INTRACTABLE, UNSPECIFIED MIGRAINE TYPE: ICD-10-CM

## 2023-04-29 DIAGNOSIS — K21.9 GASTROESOPHAGEAL REFLUX DISEASE: ICD-10-CM

## 2023-04-29 DIAGNOSIS — J30.2 SEASONAL ALLERGIES: ICD-10-CM

## 2023-04-29 DIAGNOSIS — J45.40 MODERATE PERSISTENT ASTHMA WITHOUT COMPLICATION: ICD-10-CM

## 2023-05-01 RX ORDER — OMEPRAZOLE 20 MG/1
CAPSULE, DELAYED RELEASE ORAL
Qty: 90 CAPSULE | Refills: 1 | Status: SHIPPED | OUTPATIENT
Start: 2023-05-01

## 2023-05-01 RX ORDER — CETIRIZINE HYDROCHLORIDE 10 MG/1
TABLET ORAL
Qty: 90 TABLET | Refills: 1 | Status: SHIPPED | OUTPATIENT
Start: 2023-05-01

## 2023-05-01 RX ORDER — TOPIRAMATE 25 MG/1
75 TABLET ORAL
Qty: 270 TABLET | Refills: 1 | Status: SHIPPED | OUTPATIENT
Start: 2023-05-01 | End: 2023-08-29

## 2023-05-01 RX ORDER — MONTELUKAST SODIUM 10 MG/1
TABLET ORAL
Qty: 90 TABLET | Refills: 1 | Status: SHIPPED | OUTPATIENT
Start: 2023-05-01

## 2023-05-01 RX ORDER — FLUTICASONE PROPIONATE AND SALMETEROL 50; 250 UG/1; UG/1
POWDER RESPIRATORY (INHALATION)
Qty: 60 BLISTER | Refills: 1 | Status: SHIPPED | OUTPATIENT
Start: 2023-05-01

## 2023-05-11 DIAGNOSIS — E11.9 CONTROLLED TYPE 2 DIABETES MELLITUS WITHOUT COMPLICATION, WITHOUT LONG-TERM CURRENT USE OF INSULIN (HCC): ICD-10-CM

## 2023-05-11 RX ORDER — LOSARTAN POTASSIUM 25 MG/1
25 TABLET ORAL DAILY
Qty: 90 TABLET | Refills: 1 | Status: SHIPPED | OUTPATIENT
Start: 2023-05-11

## 2023-05-24 ENCOUNTER — TELEPHONE (OUTPATIENT)
Dept: GASTROENTEROLOGY | Facility: CLINIC | Age: 48
End: 2023-05-24

## 2023-05-24 ENCOUNTER — PREP FOR PROCEDURE (OUTPATIENT)
Dept: GASTROENTEROLOGY | Facility: CLINIC | Age: 48
End: 2023-05-24

## 2023-05-24 DIAGNOSIS — Z12.11 SCREENING FOR COLON CANCER: Primary | ICD-10-CM

## 2023-05-24 NOTE — TELEPHONE ENCOUNTER
Scheduled date of colonoscopy (as of today):7/6/23  Physician performing colonoscopy:SHAHANA  Location of colonoscopy:[facility]  Clearances: NA

## 2023-06-20 DIAGNOSIS — Z12.11 SCREENING FOR COLON CANCER: Primary | ICD-10-CM

## 2023-07-06 ENCOUNTER — ANESTHESIA EVENT (OUTPATIENT)
Dept: GASTROENTEROLOGY | Facility: HOSPITAL | Age: 48
End: 2023-07-06

## 2023-07-06 ENCOUNTER — HOSPITAL ENCOUNTER (OUTPATIENT)
Dept: GASTROENTEROLOGY | Facility: HOSPITAL | Age: 48
Setting detail: OUTPATIENT SURGERY
End: 2023-07-06
Attending: INTERNAL MEDICINE
Payer: COMMERCIAL

## 2023-07-06 ENCOUNTER — ANESTHESIA (OUTPATIENT)
Dept: GASTROENTEROLOGY | Facility: HOSPITAL | Age: 48
End: 2023-07-06

## 2023-07-06 VITALS
HEART RATE: 81 BPM | SYSTOLIC BLOOD PRESSURE: 122 MMHG | RESPIRATION RATE: 16 BRPM | WEIGHT: 213 LBS | DIASTOLIC BLOOD PRESSURE: 82 MMHG | HEIGHT: 58 IN | TEMPERATURE: 97.1 F | OXYGEN SATURATION: 98 % | BODY MASS INDEX: 44.71 KG/M2

## 2023-07-06 DIAGNOSIS — Z12.11 SCREENING FOR COLON CANCER: ICD-10-CM

## 2023-07-06 DIAGNOSIS — Z83.71 FAMILY HISTORY OF COLONIC POLYPS: ICD-10-CM

## 2023-07-06 LAB
EXT PREGNANCY TEST URINE: NEGATIVE
EXT. CONTROL: NORMAL

## 2023-07-06 PROCEDURE — 88305 TISSUE EXAM BY PATHOLOGIST: CPT | Performed by: PATHOLOGY

## 2023-07-06 PROCEDURE — 81025 URINE PREGNANCY TEST: CPT | Performed by: INTERNAL MEDICINE

## 2023-07-06 PROCEDURE — 45385 COLONOSCOPY W/LESION REMOVAL: CPT | Performed by: INTERNAL MEDICINE

## 2023-07-06 RX ORDER — SODIUM CHLORIDE, SODIUM LACTATE, POTASSIUM CHLORIDE, CALCIUM CHLORIDE 600; 310; 30; 20 MG/100ML; MG/100ML; MG/100ML; MG/100ML
125 INJECTION, SOLUTION INTRAVENOUS CONTINUOUS
Status: DISCONTINUED | OUTPATIENT
Start: 2023-07-06 | End: 2023-07-10 | Stop reason: HOSPADM

## 2023-07-06 RX ORDER — PROPOFOL 10 MG/ML
INJECTION, EMULSION INTRAVENOUS AS NEEDED
Status: DISCONTINUED | OUTPATIENT
Start: 2023-07-06 | End: 2023-07-06

## 2023-07-06 RX ORDER — LIDOCAINE HYDROCHLORIDE 20 MG/ML
INJECTION, SOLUTION EPIDURAL; INFILTRATION; INTRACAUDAL; PERINEURAL AS NEEDED
Status: DISCONTINUED | OUTPATIENT
Start: 2023-07-06 | End: 2023-07-06

## 2023-07-06 RX ORDER — PROPOFOL 10 MG/ML
INJECTION, EMULSION INTRAVENOUS CONTINUOUS PRN
Status: DISCONTINUED | OUTPATIENT
Start: 2023-07-06 | End: 2023-07-06

## 2023-07-06 RX ADMIN — PROPOFOL 30 MG: 10 INJECTION, EMULSION INTRAVENOUS at 08:54

## 2023-07-06 RX ADMIN — PROPOFOL 100 MG: 10 INJECTION, EMULSION INTRAVENOUS at 08:51

## 2023-07-06 RX ADMIN — PROPOFOL 130 MCG/KG/MIN: 10 INJECTION, EMULSION INTRAVENOUS at 08:51

## 2023-07-06 RX ADMIN — LIDOCAINE HYDROCHLORIDE 50 MG: 20 INJECTION, SOLUTION EPIDURAL; INFILTRATION; INTRACAUDAL; PERINEURAL at 08:51

## 2023-07-06 RX ADMIN — SODIUM CHLORIDE, SODIUM LACTATE, POTASSIUM CHLORIDE, AND CALCIUM CHLORIDE 125 ML/HR: .6; .31; .03; .02 INJECTION, SOLUTION INTRAVENOUS at 08:15

## 2023-07-06 NOTE — H&P
History and Physical - [facility]  Neel Brewster 57 y.o. female MRN: [id number]                  HPI: Neel Brewster is a 57y.o. year old female who presents for screening colonoscopy      REVIEW OF SYSTEMS: Per the HPI, and otherwise unremarkable.     Historical Information   Past Medical History:   Diagnosis Date   • Allergic    • Asthma    • Migraines      Past Surgical History:   Procedure Laterality Date   • HERNIA REPAIR       Social History   Social History     Substance and Sexual Activity   Alcohol Use Never     Social History     Substance and Sexual Activity   Drug Use Never     Social History     Tobacco Use   Smoking Status Every Day   • Packs/day: 1.00   • Types: Cigarettes   Smokeless Tobacco Current     Family History   Problem Relation Age of Onset   • No Known Problems Mother    • No Known Problems Father    • No Known Problems Sister    • No Known Problems Maternal Grandmother    • No Known Problems Paternal Grandmother    • No Known Problems Maternal Aunt    • No Known Problems Maternal Aunt    • No Known Problems Paternal Aunt        Meds/Allergies       Current Outpatient Medications:   •  Advair Diskus 250-50 MCG/ACT inhaler  •  albuterol (Ventolin HFA) 90 mcg/act inhaler  •  Alcaftadine (Lastacaft) 0.25 % SOLN  •  amitriptyline (ELAVIL) 10 mg tablet  •  cetirizine (ZyrTEC) 10 mg tablet  •  cholecalciferol (VITAMIN D3) 1,000 units tablet  •  fluticasone (FLONASE) 50 mcg/act nasal spray  •  losartan (COZAAR) 25 mg tablet  •  montelukast (SINGULAIR) 10 mg tablet  •  omeprazole (PriLOSEC) 20 mg delayed release capsule  •  topiramate (TOPAMAX) 25 mg tablet  •  albuterol (2.5 mg/3 mL) 0.083 % nebulizer solution  •  azelastine (OPTIVAR) 0.05 % ophthalmic solution  •  nystatin (MYCOSTATIN) powder  •  polyethylene glycol (GOLYTELY) 4000 mL solution  •  ZOLMitriptan (Zomig) 5 MG tablet    Current Facility-Administered Medications:   •  lactated ringers infusion, 125 mL/hr, Intravenous, Continuous    Allergies   Allergen Reactions   • Aspirin    • Cephalexin        Objective     /68   Pulse 93   Temp (!) 96.7 °F (35.9 °C) (Temporal)   Resp 16   Ht 4' 10" (1.473 m)   Wt 96.6 kg (213 lb)   LMP 06/27/2023   SpO2 98%   BMI 44.52 kg/m²       PHYSICAL EXAM    Gen: NAD  Head: NCAT  CV: RRR  CHEST: Clear  ABD: soft, NT/ND  EXT: no edema      ASSESSMENT/PLAN:  This is a 57y.o. year old female here for screening colonoscopy, and she is stable and optimized for her procedure.

## 2023-07-06 NOTE — ANESTHESIA PREPROCEDURE EVALUATION
Procedure:  COLONOSCOPY    Relevant Problems   CARDIO   (+) Hyperlipidemia   (+) Migraine without status migrainosus, not intractable      ENDO   (+) Controlled type 2 diabetes mellitus without complication, without long-term current use of insulin (HCC)      NEURO/PSYCH   (+) Migraine without status migrainosus, not intractable      PULMONARY   (+) Asthma        Physical Exam    Airway    Mallampati score: III  TM Distance: >3 FB  Neck ROM: full     Dental   No notable dental hx     Cardiovascular  Cardiovascular exam normal    Pulmonary  Pulmonary exam normal     Other Findings        Anesthesia Plan  ASA Score- 3     Anesthesia Type- IV sedation with anesthesia with ASA Monitors. Additional Monitors:   Airway Plan:           Plan Factors-Exercise tolerance (METS): >4 METS. Chart reviewed. Patient summary reviewed. Patient is not a current smoker. Induction- intravenous. Postoperative Plan-     Informed Consent- Anesthetic plan and risks discussed with patient.

## 2023-07-06 NOTE — ANESTHESIA POSTPROCEDURE EVALUATION
Post-Op Assessment Note    CV Status:  Stable  Pain Score: 0    Pain management: adequate     Mental Status:  Arousable   Hydration Status:  Stable   PONV Controlled:  None   Airway Patency:  Patent      Post Op Vitals Reviewed: Yes      Staff: CRNA         No notable events documented.     BP   118/71   Temp     Pulse  87   Resp   26   SpO2   99%

## 2023-07-10 PROCEDURE — 88305 TISSUE EXAM BY PATHOLOGIST: CPT | Performed by: PATHOLOGY

## 2023-07-25 ENCOUNTER — HOSPITAL ENCOUNTER (OUTPATIENT)
Dept: MAMMOGRAPHY | Facility: HOSPITAL | Age: 48
Discharge: HOME | End: 2023-07-25
Payer: COMMERCIAL

## 2023-07-25 VITALS — HEIGHT: 58 IN | WEIGHT: 213 LBS | BODY MASS INDEX: 44.71 KG/M2

## 2023-07-25 DIAGNOSIS — Z12.31 BREAST CANCER SCREENING BY MAMMOGRAM: ICD-10-CM

## 2023-07-25 PROCEDURE — 77067 SCR MAMMO BI INCL CAD: CPT

## 2023-07-25 PROCEDURE — 77063 BREAST TOMOSYNTHESIS BI: CPT

## 2023-08-29 DIAGNOSIS — H10.13 ALLERGIC CONJUNCTIVITIS OF BOTH EYES: ICD-10-CM

## 2023-08-29 RX ORDER — AZELASTINE HYDROCHLORIDE 0.5 MG/ML
SOLUTION/ DROPS OPHTHALMIC
Qty: 18 ML | Refills: 1 | Status: SHIPPED | OUTPATIENT
Start: 2023-08-29

## 2023-09-19 ENCOUNTER — APPOINTMENT (EMERGENCY)
Dept: CT IMAGING | Facility: HOSPITAL | Age: 48
End: 2023-09-19
Payer: COMMERCIAL

## 2023-09-19 ENCOUNTER — HOSPITAL ENCOUNTER (EMERGENCY)
Facility: HOSPITAL | Age: 48
Discharge: HOME | End: 2023-09-20
Attending: EMERGENCY MEDICINE
Payer: COMMERCIAL

## 2023-09-19 DIAGNOSIS — L03.211 CELLULITIS OF FACE: ICD-10-CM

## 2023-09-19 DIAGNOSIS — K04.7 DENTAL INFECTION: Primary | ICD-10-CM

## 2023-09-19 LAB
ANION GAP SERPL CALCULATED.3IONS-SCNC: 9 MMOL/L
BASOPHILS # BLD AUTO: 0.05 THOUSANDS/ÂΜL (ref 0–0.1)
BASOPHILS NFR BLD AUTO: 0 % (ref 0–1)
BUN SERPL-MCNC: 5 MG/DL (ref 5–25)
CALCIUM SERPL-MCNC: 9.2 MG/DL (ref 8.4–10.2)
CHLORIDE SERPL-SCNC: 105 MMOL/L (ref 96–108)
CO2 SERPL-SCNC: 22 MMOL/L (ref 21–32)
CREAT SERPL-MCNC: 0.58 MG/DL (ref 0.6–1.3)
EOSINOPHIL # BLD AUTO: 0.32 THOUSAND/ÂΜL (ref 0–0.61)
EOSINOPHIL NFR BLD AUTO: 3 % (ref 0–6)
ERYTHROCYTE [DISTWIDTH] IN BLOOD BY AUTOMATED COUNT: 16.1 % (ref 11.6–15.1)
EXT PREGNANCY TEST URINE: NEGATIVE
EXT. CONTROL: NORMAL
GFR SERPL CREATININE-BSD FRML MDRD: 110 ML/MIN/1.73SQ M
GLUCOSE SERPL-MCNC: 135 MG/DL (ref 65–140)
HCT VFR BLD AUTO: 41.2 % (ref 34.8–46.1)
HGB BLD-MCNC: 13.3 G/DL (ref 11.5–15.4)
IMM GRANULOCYTES # BLD AUTO: 0.07 THOUSAND/UL (ref 0–0.2)
IMM GRANULOCYTES NFR BLD AUTO: 1 % (ref 0–2)
LYMPHOCYTES # BLD AUTO: 2.49 THOUSANDS/ÂΜL (ref 0.6–4.47)
LYMPHOCYTES NFR BLD AUTO: 19 % (ref 14–44)
MCH RBC QN AUTO: 26.1 PG (ref 26.8–34.3)
MCHC RBC AUTO-ENTMCNC: 32.3 G/DL (ref 31.4–37.4)
MCV RBC AUTO: 81 FL (ref 82–98)
MONOCYTES # BLD AUTO: 0.57 THOUSAND/ÂΜL (ref 0.17–1.22)
MONOCYTES NFR BLD AUTO: 4 % (ref 4–12)
NEUTROPHILS # BLD AUTO: 9.37 THOUSANDS/ÂΜL (ref 1.85–7.62)
NEUTS SEG NFR BLD AUTO: 73 % (ref 43–75)
NRBC BLD AUTO-RTO: 0 /100 WBCS
PLATELET # BLD AUTO: 342 THOUSANDS/UL (ref 149–390)
PMV BLD AUTO: 9.1 FL (ref 8.9–12.7)
POTASSIUM SERPL-SCNC: 3.3 MMOL/L (ref 3.5–5.3)
RBC # BLD AUTO: 5.1 MILLION/UL (ref 3.81–5.12)
SODIUM SERPL-SCNC: 136 MMOL/L (ref 135–147)
WBC # BLD AUTO: 12.87 THOUSAND/UL (ref 4.31–10.16)

## 2023-09-19 PROCEDURE — G1004 CDSM NDSC: HCPCS

## 2023-09-19 PROCEDURE — 80048 BASIC METABOLIC PNL TOTAL CA: CPT | Performed by: PHYSICIAN ASSISTANT

## 2023-09-19 PROCEDURE — 70487 CT MAXILLOFACIAL W/DYE: CPT

## 2023-09-19 PROCEDURE — 99284 EMERGENCY DEPT VISIT MOD MDM: CPT | Performed by: PHYSICIAN ASSISTANT

## 2023-09-19 PROCEDURE — 99283 EMERGENCY DEPT VISIT LOW MDM: CPT

## 2023-09-19 PROCEDURE — 96374 THER/PROPH/DIAG INJ IV PUSH: CPT

## 2023-09-19 PROCEDURE — 85025 COMPLETE CBC W/AUTO DIFF WBC: CPT | Performed by: PHYSICIAN ASSISTANT

## 2023-09-19 PROCEDURE — 36415 COLL VENOUS BLD VENIPUNCTURE: CPT | Performed by: PHYSICIAN ASSISTANT

## 2023-09-19 PROCEDURE — 81025 URINE PREGNANCY TEST: CPT | Performed by: PHYSICIAN ASSISTANT

## 2023-09-19 RX ADMIN — IOHEXOL 85 ML: 350 INJECTION, SOLUTION INTRAVENOUS at 22:51

## 2023-09-19 RX ADMIN — MORPHINE SULFATE 2 MG: 2 INJECTION, SOLUTION INTRAMUSCULAR; INTRAVENOUS at 21:59

## 2023-09-20 VITALS
TEMPERATURE: 98 F | RESPIRATION RATE: 16 BRPM | DIASTOLIC BLOOD PRESSURE: 88 MMHG | SYSTOLIC BLOOD PRESSURE: 167 MMHG | OXYGEN SATURATION: 98 % | HEART RATE: 73 BPM

## 2023-09-20 PROCEDURE — 96376 TX/PRO/DX INJ SAME DRUG ADON: CPT

## 2023-09-20 RX ORDER — CLINDAMYCIN HYDROCHLORIDE 150 MG/1
450 CAPSULE ORAL ONCE
Status: COMPLETED | OUTPATIENT
Start: 2023-09-20 | End: 2023-09-20

## 2023-09-20 RX ORDER — CLINDAMYCIN HYDROCHLORIDE 150 MG/1
450 CAPSULE ORAL 3 TIMES DAILY
Qty: 63 CAPSULE | Refills: 0 | Status: SHIPPED | OUTPATIENT
Start: 2023-09-20 | End: 2023-09-27

## 2023-09-20 RX ADMIN — MORPHINE SULFATE 2 MG: 2 INJECTION, SOLUTION INTRAMUSCULAR; INTRAVENOUS at 00:11

## 2023-09-20 RX ADMIN — CLINDAMYCIN HYDROCHLORIDE 450 MG: 150 CAPSULE ORAL at 00:40

## 2023-09-20 NOTE — ED PROVIDER NOTES
History  Chief Complaint   Patient presents with   • Dental Pain     Pt reports "I have not been able to get into a dentist and now my face has swelled up and before it gets into my blood stream I know I need an antibiotic." Pt noted to have swelling to right side. Pt reports she has cracked teeth on both sides of mouth. King Lizeth is a 57 y.o. female with no significant past medical history presenting to ER complaining of right-sided dental pain which began over the past week. Reports that today began with sudden swelling right side of face and right upper dental pain significantly worsened. She reports that the molar right upper which is painful had previously had a dental filling which fell out a couple of months ago and since then she has had intermittent pain at the tooth. Denies any throat pain, throat swelling, pain under the tongue, neck pain, fevers, or chills. Reports that she did make an appointment with dentist next month. Prior to Admission Medications   Prescriptions Last Dose Informant Patient Reported? Taking? Advair Diskus 250-50 MCG/ACT inhaler   No No   Sig: INHALE 1 DOSE BY MOUTH TWICE DAILY. RINSE MOUTH AFTER USE   Alcaftadine (Lastacaft) 0.25 % SOLN   No No   Sig: Administer 1 drop to both eyes daily as needed (allergic eye symptoms)   ZOLMitriptan (Zomig) 5 MG tablet   No No   Sig: Take 1 tablet at onset of headache. Max 2 tablets per week.    albuterol (2.5 mg/3 mL) 0.083 % nebulizer solution   No No   Sig: Take 3 mL (2.5 mg total) by nebulization every 4 (four) hours as needed for wheezing   albuterol (Ventolin HFA) 90 mcg/act inhaler   No No   Sig: Inhale 1-2 puffs every 4 (four) hours as needed for wheezing   amitriptyline (ELAVIL) 10 mg tablet   No No   Sig: Take 2 tablets (20 mg total) by mouth daily at bedtime   azelastine (OPTIVAR) 0.05 % ophthalmic solution   No No   Sig: INSTILL 1 DROP INTO BOTH EYES TWICE A DAY   cetirizine (ZyrTEC) 10 mg tablet   No No Sig: TAKE 1 TABLET BY MOUTH EVERY DAY   cholecalciferol (VITAMIN D3) 1,000 units tablet   No No   Sig: Take 1 tablet (1,000 Units total) by mouth daily   fluticasone (FLONASE) 50 mcg/act nasal spray   No No   Si sprays into each nostril daily   losartan (COZAAR) 25 mg tablet   No No   Sig: TAKE 1 TABLET (25 MG TOTAL) BY MOUTH DAILY. montelukast (SINGULAIR) 10 mg tablet   No No   Sig: TAKE 1 TABLET BY MOUTH DAILY AT BEDTIME   nystatin (MYCOSTATIN) powder   No No   Sig: Apply topically 3 (three) times a day   omeprazole (PriLOSEC) 20 mg delayed release capsule   No No   Sig: TAKE 1 CAPSULE BY MOUTH EVERY DAY   polyethylene glycol (GOLYTELY) 4000 mL solution   No No   Sig: Take 4,000 mL by mouth once for 1 dose   topiramate (TOPAMAX) 25 mg tablet   No No   Sig: TAKE 3 TABLETS (75 MG TOTAL) BY MOUTH DAILY AT BEDTIME      Facility-Administered Medications: None       Past Medical History:   Diagnosis Date   • Allergic    • Asthma    • Migraines        Past Surgical History:   Procedure Laterality Date   • HERNIA REPAIR         Family History   Problem Relation Age of Onset   • No Known Problems Mother    • No Known Problems Father    • No Known Problems Sister    • No Known Problems Maternal Grandmother    • No Known Problems Paternal Grandmother    • No Known Problems Maternal Aunt    • No Known Problems Maternal Aunt    • No Known Problems Paternal Aunt      I have reviewed and agree with the history as documented. E-Cigarette/Vaping   • E-Cigarette Use Never User      E-Cigarette/Vaping Substances   • Nicotine No    • THC No    • CBD No    • Flavoring No    • Other No    • Unknown No      Social History     Tobacco Use   • Smoking status: Every Day     Packs/day: 1.00     Types: Cigarettes   • Smokeless tobacco: Current   Vaping Use   • Vaping Use: Never used   Substance Use Topics   • Alcohol use: Never   • Drug use: Never       Review of Systems   Constitutional: Negative for chills and fever.    HENT: Positive for dental problem. Negative for ear pain and sore throat. Eyes: Negative for pain and visual disturbance. Respiratory: Negative for cough and shortness of breath. Cardiovascular: Negative for chest pain and palpitations. Gastrointestinal: Negative for abdominal pain and vomiting. Genitourinary: Negative for dysuria and hematuria. Musculoskeletal: Negative for arthralgias and back pain. Skin: Negative for color change and rash. Neurological: Negative for seizures and syncope. All other systems reviewed and are negative. Physical Exam  Physical Exam  Vitals and nursing note reviewed. Constitutional:       General: She is not in acute distress. Appearance: She is well-developed. She is not ill-appearing, toxic-appearing or diaphoretic. HENT:      Head: Normocephalic and atraumatic. No right periorbital erythema or left periorbital erythema. Jaw: No trismus, tenderness or pain on movement. Mouth/Throat:      Lips: No lesions. Mouth: Mucous membranes are moist. No injury, lacerations, oral lesions or angioedema. Dentition: Dental tenderness and dental caries present. No gingival swelling or dental abscesses. Eyes:      Conjunctiva/sclera: Conjunctivae normal.   Cardiovascular:      Rate and Rhythm: Normal rate and regular rhythm. Heart sounds: No murmur heard. Pulmonary:      Effort: Pulmonary effort is normal. No respiratory distress. Breath sounds: Normal breath sounds. Abdominal:      Palpations: Abdomen is soft. Tenderness: There is no abdominal tenderness. Musculoskeletal:         General: No swelling. Cervical back: Neck supple. Skin:     General: Skin is warm and dry. Capillary Refill: Capillary refill takes less than 2 seconds. Neurological:      Mental Status: She is alert.    Psychiatric:         Mood and Affect: Mood normal.         Vital Signs  ED Triage Vitals   Temperature Pulse Respirations Blood Pressure SpO2   09/19/23 2013 09/19/23 2014 09/19/23 2014 09/19/23 2014 09/19/23 2014   98 °F (36.7 °C) 102 19 (!) 175/98 99 %      Temp Source Heart Rate Source Patient Position - Orthostatic VS BP Location FiO2 (%)   09/19/23 2013 09/19/23 2014 09/19/23 2014 09/19/23 2014 --   Oral Monitor Sitting Right arm       Pain Score       09/19/23 2159       10 - Worst Possible Pain           Vitals:    09/19/23 2014 09/19/23 2230 09/19/23 2330 09/20/23 0000   BP: (!) 175/98 127/74 142/73 167/88   Pulse: 102 87 76 73   Patient Position - Orthostatic VS: Sitting Lying Lying Lying         Visual Acuity      ED Medications  Medications   morphine injection 2 mg (2 mg Intravenous Given 9/19/23 2159)   iohexol (OMNIPAQUE) 350 MG/ML injection (MULTI-DOSE) 85 mL (85 mL Intravenous Given 9/19/23 2251)   morphine injection 2 mg (2 mg Intravenous Given 9/20/23 0011)   clindamycin (CLEOCIN) capsule 450 mg (450 mg Oral Given 9/20/23 0040)       Diagnostic Studies  Results Reviewed     Procedure Component Value Units Date/Time    Basic metabolic panel [[id number]]  (Abnormal) Collected: 09/19/23 2159    Lab Status: Final result Specimen: Blood from Arm, Left Updated: 09/19/23 2233     Sodium 136 mmol/L      Potassium 3.3 mmol/L      Chloride 105 mmol/L      CO2 22 mmol/L      ANION GAP 9 mmol/L      BUN 5 mg/dL      Creatinine 0.58 mg/dL      Glucose 135 mg/dL      Calcium 9.2 mg/dL      eGFR 110 ml/min/1.73sq m     Narrative:      [place] guidelines for Chronic Kidney Disease (CKD):   •  Stage 1 with normal or high GFR (GFR > 90 mL/min/1.73 square meters)  •  Stage 2 Mild CKD (GFR = 60-89 mL/min/1.73 square meters)  •  Stage 3A Moderate CKD (GFR = 45-59 mL/min/1.73 square meters)  •  Stage 3B Moderate CKD (GFR = 30-44 mL/min/1.73 square meters)  •  Stage 4 Severe CKD (GFR = 15-29 mL/min/1.73 square meters)  •  Stage 5 End Stage CKD (GFR <15 mL/min/1.73 square meters)  Note: GFR calculation is accurate only with a steady state creatinine    CBC and differential [[id number]]  (Abnormal) Collected: 09/19/23 2159    Lab Status: Final result Specimen: Blood from Arm, Left Updated: 09/19/23 2217     WBC 12.87 Thousand/uL      RBC 5.10 Million/uL      Hemoglobin 13.3 g/dL      Hematocrit 41.2 %      MCV 81 fL      MCH 26.1 pg      MCHC 32.3 g/dL      RDW 16.1 %      MPV 9.1 fL      Platelets 221 Thousands/uL      nRBC 0 /100 WBCs      Neutrophils Relative 73 %      Immat GRANS % 1 %      Lymphocytes Relative 19 %      Monocytes Relative 4 %      Eosinophils Relative 3 %      Basophils Relative 0 %      Neutrophils Absolute 9.37 Thousands/µL      Immature Grans Absolute 0.07 Thousand/uL      Lymphocytes Absolute 2.49 Thousands/µL      Monocytes Absolute 0.57 Thousand/µL      Eosinophils Absolute 0.32 Thousand/µL      Basophils Absolute 0.05 Thousands/µL     POCT pregnancy, urine [[id number]]  (Normal) Resulted: 09/19/23 2217    Lab Status: Final result Updated: 09/19/23 2217     EXT Preg Test, Ur Negative     Control Valid                 CT facial bones with contrast   Final Result by Justice Cody MD (09/20 0010)         1. Mild cellulitis in the right maxillary and mandibular subcutaneous soft tissues. No evidence of abscess. 2. Periapical abscess involving the right maxillary second premolar. No subperiosteal abscess. 3. Mucosal thickening in the right maxillary sinus. Possible odontogenic source of infection. Workstation performed: [id number]                    Procedures  Procedures         ED Course                                             Medical Decision Making  Aline Lam is a 57 y.o. female with no significant past medical history presenting to ER complaining of right-sided dental pain which began over the past week. Reports that today began with sudden swelling right side of face and right upper dental pain significantly worsened. On exam patient well-appearing in no acute distress.   Vital signs within normal limits. Physical examination reveals right-sided facial swelling with tenderness palpation over right maxillary sinus. No deformity or obvious noticeable abscesses. Right upper first molar is broken with dental caries. Tenderness to palpation with tongue depressor. No surrounding gingival erythema or swelling. No dental abscess noted. Sublingual region is soft and nontender. No brawniness. Examination otherwise unremarkable. Discussed with patient that considering the significant facial swelling and no intraoral abscess visualized by me and be advisable to CT to evaluate facial swelling and extent of infection. We will also order basic labs. Patient agreeable with plan. Labs with mildly elevated white count and mildly decreased potassium. CT scan shows "Mild cellulitis in the right maxillary and mandibular subcutaneous soft tissues. No evidence of abscess. Periapical abscess involving the right maxillary second premolar. No subperiosteal abscess. Mucosal thickening in the right maxillary sinus. Possible odontogenic source of infection."  Discussed all imaging and lab results extensively with patient. Discussed with patient we will treat with clindamycin due to her allergy to Keflex. Discussed with patient that it is, supports that she follows very closely with her dentist with primary care provider for reevaluation and management. Discussed appropriate use of antibiotics and supportive care at home. Advised strict return precautions to the ER. Patient is understanding and agreeable with plan. Patient has remained stable throughout stay in ER and stable for discharge. Cellulitis of face: acute illness or injury  Dental infection: acute illness or injury  Amount and/or Complexity of Data Reviewed  Labs: ordered. Radiology: ordered. Risk  Prescription drug management.           Disposition  Final diagnoses:   Dental infection   Cellulitis of face     Time reflects when diagnosis was documented in both MDM as applicable and the Disposition within this note     Time User Action Codes Description Comment    9/20/2023 12:29 AM Leola Pizano Add [K04.7] Dental infection     9/20/2023 12:29 AM Marco Reesedede [address] [place] [address] [S61.699] Cellulitis of face       ED Disposition     ED Disposition   Discharge    Condition   Stable    Date/Time   Wed Sep 20, 2023 12:28 AM    Comment   Malinda Perry discharge to home/self care. Follow-up Information     Follow up With Specialties Details Why Contact Info Additional Information    Linwood Denver, DO Family Medicine Schedule an appointment as soon as possible for a visit in 1 day  [address] [facility] [place] [place] Emergency Department Emergency Medicine  If symptoms worsen [address] [address]  [address] [facility] Emergency Department, [address] [place] [address], [id number]          Discharge Medication List as of 9/20/2023 12:31 AM      START taking these medications    Details   clindamycin (CLEOCIN) 150 mg capsule Take 3 capsules (450 mg total) by mouth 3 (three) times a day for 7 days, Starting Wed 9/20/2023, Until Wed 9/27/2023, Normal         CONTINUE these medications which have NOT CHANGED    Details   Advair Diskus 250-50 MCG/ACT inhaler INHALE 1 DOSE BY MOUTH TWICE DAILY.  RINSE MOUTH AFTER USE, Normal      albuterol (2.5 mg/3 mL) 0.083 % nebulizer solution Take 3 mL (2.5 mg total) by nebulization every 4 (four) hours as needed for wheezing, Starting Wed 7/27/2022, Normal      albuterol (Ventolin HFA) 90 mcg/act inhaler Inhale 1-2 puffs every 4 (four) hours as needed for wheezing, Starting Wed 7/27/2022, Normal      Alcaftadine (Lastacaft) 0.25 % SOLN Administer 1 drop to both eyes daily as needed (allergic eye symptoms), Starting Wed 1/11/2023, Normal      amitriptyline (ELAVIL) 10 mg tablet Take 2 tablets (20 mg total) by mouth daily at bedtime, Starting Wed 1/11/2023, Normal      azelastine (OPTIVAR) 0.05 % ophthalmic solution INSTILL 1 DROP INTO BOTH EYES TWICE A DAY, Normal      cetirizine (ZyrTEC) 10 mg tablet TAKE 1 TABLET BY MOUTH EVERY DAY, Normal      cholecalciferol (VITAMIN D3) 1,000 units tablet Take 1 tablet (1,000 Units total) by mouth daily, Starting Wed 1/11/2023, Normal      fluticasone (FLONASE) 50 mcg/act nasal spray 2 sprays into each nostril daily, Starting Wed 1/11/2023, Normal      losartan (COZAAR) 25 mg tablet TAKE 1 TABLET (25 MG TOTAL) BY MOUTH DAILY. , Starting Thu 5/11/2023, Normal      montelukast (SINGULAIR) 10 mg tablet TAKE 1 TABLET BY MOUTH DAILY AT BEDTIME, Normal      nystatin (MYCOSTATIN) powder Apply topically 3 (three) times a day, Starting Wed 1/11/2023, Normal      omeprazole (PriLOSEC) 20 mg delayed release capsule TAKE 1 CAPSULE BY MOUTH EVERY DAY, Normal      polyethylene glycol (GOLYTELY) 4000 mL solution Take 4,000 mL by mouth once for 1 dose, Starting Tue 6/20/2023, Normal      topiramate (TOPAMAX) 25 mg tablet TAKE 3 TABLETS (75 MG TOTAL) BY MOUTH DAILY AT BEDTIME, Starting Mon 5/1/2023, Until Tue 8/29/2023, Normal      ZOLMitriptan (Zomig) 5 MG tablet Take 1 tablet at onset of headache. Max 2 tablets per week., Normal             No discharge procedures on file.     PDMP Review     None          ED Provider  Electronically Signed by           Salina Valerio PA-C  09/21/23 0005

## 2023-09-20 NOTE — ED NOTES
Patient transported to [facility], [address] [place] [address]  09/19/23 8934

## 2023-10-11 ENCOUNTER — TELEPHONE (OUTPATIENT)
Dept: FAMILY MEDICINE CLINIC | Facility: CLINIC | Age: 48
End: 2023-10-11

## 2023-10-11 NOTE — TELEPHONE ENCOUNTER
Problem: Communication  Goal: The ability to communicate needs accurately and effectively will improve  Outcome: PROGRESSING AS EXPECTED   Pt communicates needs and questions effectively.      Problem: Safety  Goal: Will remain free from injury  Outcome: PROGRESSING AS EXPECTED   Pt ambulates steadily. Area free from clutter.  socks on.   Patient called asking if her paperwork that she dropped off is finished, she needs to have this back by Friday. Please let me know the status of the paperwork and I will inform the patient.

## 2023-10-13 DIAGNOSIS — J30.2 SEASONAL ALLERGIES: ICD-10-CM

## 2023-10-13 DIAGNOSIS — G43.909 MIGRAINE WITHOUT STATUS MIGRAINOSUS, NOT INTRACTABLE, UNSPECIFIED MIGRAINE TYPE: ICD-10-CM

## 2023-10-13 DIAGNOSIS — J45.40 MODERATE PERSISTENT ASTHMA WITHOUT COMPLICATION: ICD-10-CM

## 2023-10-13 DIAGNOSIS — K21.9 GASTROESOPHAGEAL REFLUX DISEASE: ICD-10-CM

## 2023-10-13 RX ORDER — TOPIRAMATE 25 MG/1
75 TABLET ORAL
Qty: 270 TABLET | Refills: 1 | Status: SHIPPED | OUTPATIENT
Start: 2023-10-13 | End: 2024-02-10

## 2023-10-13 RX ORDER — MONTELUKAST SODIUM 10 MG/1
10 TABLET ORAL
Qty: 90 TABLET | Refills: 1 | Status: SHIPPED | OUTPATIENT
Start: 2023-10-13

## 2023-10-13 RX ORDER — OMEPRAZOLE 20 MG/1
CAPSULE, DELAYED RELEASE ORAL
Qty: 90 CAPSULE | Refills: 1 | Status: SHIPPED | OUTPATIENT
Start: 2023-10-13

## 2023-12-13 ENCOUNTER — APPOINTMENT (OUTPATIENT)
Dept: LAB | Facility: CLINIC | Age: 48
End: 2023-12-13
Payer: COMMERCIAL

## 2023-12-13 DIAGNOSIS — E11.9 CONTROLLED TYPE 2 DIABETES MELLITUS WITHOUT COMPLICATION, WITHOUT LONG-TERM CURRENT USE OF INSULIN (HCC): ICD-10-CM

## 2023-12-13 LAB
ALBUMIN SERPL BCP-MCNC: 3.9 G/DL (ref 3.5–5)
ALP SERPL-CCNC: 95 U/L (ref 34–104)
ALT SERPL W P-5'-P-CCNC: 21 U/L (ref 7–52)
ANION GAP SERPL CALCULATED.3IONS-SCNC: 7 MMOL/L
AST SERPL W P-5'-P-CCNC: 22 U/L (ref 13–39)
BILIRUB SERPL-MCNC: 0.24 MG/DL (ref 0.2–1)
BUN SERPL-MCNC: 9 MG/DL (ref 5–25)
CALCIUM SERPL-MCNC: 8.8 MG/DL (ref 8.4–10.2)
CHLORIDE SERPL-SCNC: 106 MMOL/L (ref 96–108)
CHOLEST SERPL-MCNC: 228 MG/DL
CO2 SERPL-SCNC: 25 MMOL/L (ref 21–32)
CREAT SERPL-MCNC: 0.55 MG/DL (ref 0.6–1.3)
CREAT UR-MCNC: 74.6 MG/DL
EST. AVERAGE GLUCOSE BLD GHB EST-MCNC: 169 MG/DL
GFR SERPL CREATININE-BSD FRML MDRD: 112 ML/MIN/1.73SQ M
GLUCOSE P FAST SERPL-MCNC: 122 MG/DL (ref 65–99)
HBA1C MFR BLD: 7.5 %
HDLC SERPL-MCNC: 40 MG/DL
LDLC SERPL CALC-MCNC: 135 MG/DL (ref 0–100)
MICROALBUMIN UR-MCNC: <7 MG/L
MICROALBUMIN/CREAT 24H UR: <9 MG/G CREATININE (ref 0–30)
POTASSIUM SERPL-SCNC: 3.6 MMOL/L (ref 3.5–5.3)
PROT SERPL-MCNC: 6.8 G/DL (ref 6.4–8.4)
SODIUM SERPL-SCNC: 138 MMOL/L (ref 135–147)
TRIGL SERPL-MCNC: 264 MG/DL

## 2023-12-13 PROCEDURE — 80053 COMPREHEN METABOLIC PANEL: CPT

## 2023-12-13 PROCEDURE — 80061 LIPID PANEL: CPT

## 2023-12-13 PROCEDURE — 83036 HEMOGLOBIN GLYCOSYLATED A1C: CPT

## 2023-12-13 PROCEDURE — 82043 UR ALBUMIN QUANTITATIVE: CPT

## 2023-12-13 PROCEDURE — 82570 ASSAY OF URINE CREATININE: CPT

## 2023-12-13 PROCEDURE — 36415 COLL VENOUS BLD VENIPUNCTURE: CPT

## 2023-12-20 ENCOUNTER — OFFICE VISIT (OUTPATIENT)
Dept: FAMILY MEDICINE CLINIC | Facility: CLINIC | Age: 48
End: 2023-12-20
Payer: COMMERCIAL

## 2023-12-20 VITALS
TEMPERATURE: 97.2 F | SYSTOLIC BLOOD PRESSURE: 120 MMHG | HEIGHT: 58 IN | DIASTOLIC BLOOD PRESSURE: 70 MMHG | BODY MASS INDEX: 44.29 KG/M2 | OXYGEN SATURATION: 99 % | HEART RATE: 91 BPM | WEIGHT: 211 LBS

## 2023-12-20 DIAGNOSIS — K21.9 GASTROESOPHAGEAL REFLUX DISEASE: ICD-10-CM

## 2023-12-20 DIAGNOSIS — J45.40 MODERATE PERSISTENT ASTHMA WITHOUT COMPLICATION: ICD-10-CM

## 2023-12-20 DIAGNOSIS — E78.5 HYPERLIPIDEMIA, UNSPECIFIED HYPERLIPIDEMIA TYPE: ICD-10-CM

## 2023-12-20 DIAGNOSIS — E11.42 TYPE 2 DIABETES MELLITUS WITH DIABETIC POLYNEUROPATHY, WITHOUT LONG-TERM CURRENT USE OF INSULIN (HCC): Primary | ICD-10-CM

## 2023-12-20 DIAGNOSIS — E11.9 CONTROLLED TYPE 2 DIABETES MELLITUS WITHOUT COMPLICATION, WITHOUT LONG-TERM CURRENT USE OF INSULIN (HCC): ICD-10-CM

## 2023-12-20 DIAGNOSIS — Z12.4 SCREENING FOR CERVICAL CANCER: ICD-10-CM

## 2023-12-20 DIAGNOSIS — H10.13 ALLERGIC CONJUNCTIVITIS OF BOTH EYES: ICD-10-CM

## 2023-12-20 DIAGNOSIS — E66.01 MORBID OBESITY (HCC): ICD-10-CM

## 2023-12-20 DIAGNOSIS — J30.2 SEASONAL ALLERGIES: ICD-10-CM

## 2023-12-20 DIAGNOSIS — Z23 ENCOUNTER FOR IMMUNIZATION: ICD-10-CM

## 2023-12-20 DIAGNOSIS — G43.909 MIGRAINE WITHOUT STATUS MIGRAINOSUS, NOT INTRACTABLE, UNSPECIFIED MIGRAINE TYPE: ICD-10-CM

## 2023-12-20 PROCEDURE — 90471 IMMUNIZATION ADMIN: CPT

## 2023-12-20 PROCEDURE — 99214 OFFICE O/P EST MOD 30 MIN: CPT | Performed by: FAMILY MEDICINE

## 2023-12-20 PROCEDURE — 90686 IIV4 VACC NO PRSV 0.5 ML IM: CPT

## 2023-12-20 RX ORDER — ROSUVASTATIN CALCIUM 5 MG/1
5 TABLET, COATED ORAL DAILY
Qty: 30 TABLET | Refills: 5 | Status: SHIPPED | OUTPATIENT
Start: 2023-12-20

## 2023-12-20 NOTE — PROGRESS NOTES
Diabetic Foot Exam    Patient's shoes and socks removed.    Right Foot/Ankle   Right Foot Inspection  Skin Exam: skin normal, skin intact, callus and callus. No dry skin, no warmth, no erythema, no maceration, no abnormal color, no pre-ulcer and no ulcer.     Sensory   Monofilament testing: intact    Vascular  Capillary refills: < 3 seconds  The right DP pulse is 1+. The right PT pulse is 1+.     Left Foot/Ankle  Left Foot Inspection  Skin Exam: skin normal, skin intact and callus. No dry skin, no warmth, no erythema, no maceration, normal color, no pre-ulcer and no ulcer.     Sensory   Monofilament testing: intact    Vascular  Capillary refills: < 3 seconds  The left DP pulse is 1+. The left PT pulse is 1+.     Assign Risk Category  No deformity present  No loss of protective sensation  No weak pulses  Risk: 0

## 2023-12-21 RX ORDER — ZOLMITRIPTAN 5 MG/1
TABLET, FILM COATED ORAL
Qty: 5 TABLET | Refills: 1 | Status: SHIPPED | OUTPATIENT
Start: 2023-12-21

## 2023-12-21 RX ORDER — ALBUTEROL SULFATE 90 UG/1
1-2 AEROSOL, METERED RESPIRATORY (INHALATION) EVERY 4 HOURS PRN
Qty: 8.5 G | Refills: 1 | Status: SHIPPED | OUTPATIENT
Start: 2023-12-21

## 2023-12-21 RX ORDER — ALBUTEROL SULFATE 2.5 MG/3ML
2.5 SOLUTION RESPIRATORY (INHALATION) EVERY 4 HOURS PRN
Qty: 75 ML | Refills: 1 | Status: SHIPPED | OUTPATIENT
Start: 2023-12-21

## 2023-12-21 RX ORDER — OMEPRAZOLE 20 MG/1
20 CAPSULE, DELAYED RELEASE ORAL DAILY
Qty: 90 CAPSULE | Refills: 1 | Status: SHIPPED | OUTPATIENT
Start: 2023-12-21

## 2023-12-21 RX ORDER — CETIRIZINE HYDROCHLORIDE 10 MG/1
10 TABLET ORAL DAILY
Qty: 90 TABLET | Refills: 1 | Status: SHIPPED | OUTPATIENT
Start: 2023-12-21

## 2023-12-21 RX ORDER — MONTELUKAST SODIUM 10 MG/1
10 TABLET ORAL
Qty: 90 TABLET | Refills: 1 | Status: SHIPPED | OUTPATIENT
Start: 2023-12-21

## 2023-12-21 RX ORDER — TOPIRAMATE 25 MG/1
75 TABLET ORAL
Qty: 270 TABLET | Refills: 1 | Status: SHIPPED | OUTPATIENT
Start: 2023-12-21 | End: 2024-04-19

## 2023-12-21 RX ORDER — LOSARTAN POTASSIUM 25 MG/1
25 TABLET ORAL DAILY
Qty: 90 TABLET | Refills: 1 | Status: SHIPPED | OUTPATIENT
Start: 2023-12-21

## 2023-12-21 RX ORDER — AMITRIPTYLINE HYDROCHLORIDE 10 MG/1
20 TABLET, FILM COATED ORAL
Qty: 180 TABLET | Refills: 1 | Status: SHIPPED | OUTPATIENT
Start: 2023-12-21

## 2023-12-21 RX ORDER — FLUTICASONE PROPIONATE 50 MCG
2 SPRAY, SUSPENSION (ML) NASAL DAILY
Qty: 48 ML | Refills: 1 | Status: SHIPPED | OUTPATIENT
Start: 2023-12-21

## 2023-12-28 NOTE — PROGRESS NOTES
Assessment/Plan: Blood pressure stable.  Morbidly obese.  Risk factor modification with regards to this.  Asthma fair.  Continue current inhalers.  Continue allergy medications.  Continue ARB.  A1c above 7.5.  Recheck 3 months.  Fasting sugar 122.  Rest of CMP is stable.  LDL cholesterol 133.  No evidence of microalbuminuria.  Needs yearly diabetic eye and foot examination.  Start 5 mg of rosuvastatin.  Recheck 3 months.  Goal LDL at least at this point is less than 100.  Benefits and side effects explained.  Overdue for GYN examination.  Overdue for mammography.  Noncompliant.  Also due for baseline colonoscopy.    No problem-specific Assessment & Plan notes found for this encounter.       Diagnoses and all orders for this visit:    Type 2 diabetes mellitus with diabetic polyneuropathy, without long-term current use of insulin ([facility])  -     Ambulatory Referral to Podiatry; Future    Morbid obesity ([facility])  -     Hemoglobin A1C; Future  -     Comprehensive metabolic panel; Future  -     rosuvastatin (CRESTOR) 5 mg tablet; Take 1 tablet (5 mg total) by mouth daily  -     metFORMIN (GLUCOPHAGE) 500 mg tablet; Take 1 tablet (500 mg total) by mouth 2 (two) times a day with meals    Hyperlipidemia, unspecified hyperlipidemia type  -     Lipid Panel with Direct LDL reflex; Future  -     rosuvastatin (CRESTOR) 5 mg tablet; Take 1 tablet (5 mg total) by mouth daily    Moderate persistent asthma without complication  -     Nebulizer  -     Nebulizer Supplies    Screening for cervical cancer    Encounter for immunization  -     influenza vaccine, quadrivalent, 0.5 mL, preservative-free, for adult and pediatric patients 6 mos+ (AFLURIA, FLUARIX, FLULAVAL, FLUZONE)        1. Type 2 diabetes mellitus with diabetic polyneuropathy, without long-term current use of insulin ([facility])  Ambulatory Referral to Podiatry      2. Morbid obesity (HCC)  Hemoglobin A1C    Comprehensive metabolic panel    rosuvastatin (CRESTOR) 5 mg tablet     metFORMIN (GLUCOPHAGE) 500 mg tablet      3. Hyperlipidemia, unspecified hyperlipidemia type  Lipid Panel with Direct LDL reflex    rosuvastatin (CRESTOR) 5 mg tablet      4. Moderate persistent asthma without complication  Nebulizer    Nebulizer Supplies      5. Screening for cervical cancer        6. Encounter for immunization  influenza vaccine, quadrivalent, 0.5 mL, preservative-free, for adult and pediatric patients 6 mos+ (AFLURIA, FLUARIX, FLULAVAL, FLUZONE)          Subjective:        Patient ID: Iris Curtis is a 48 y.o. female.  Chief Complaint   Patient presents with    Diabetes     6 month followup       Diabetic recheck.  Has been recheck.  Needs refills.    Diabetes  Pertinent negatives for hypoglycemia include no dizziness, headaches, nervousness/anxiousness, pallor or tremors. Pertinent negatives for diabetes include no chest pain, no fatigue and no weakness.       The following portions of the patient's history were reviewed and updated as appropriate: past medical history, past surgical history and problem list.      Review of Systems   Constitutional:  Negative for appetite change, fatigue, fever and unexpected weight change.   HENT:  Negative for congestion, ear pain, postnasal drip, rhinorrhea, sinus pressure, sinus pain and sore throat.    Eyes:  Negative for redness and visual disturbance.   Respiratory:  Positive for wheezing. Negative for chest tightness and shortness of breath.    Cardiovascular:  Negative for chest pain, palpitations and leg swelling.   Gastrointestinal:  Negative for abdominal distention, abdominal pain, diarrhea and nausea.   Endocrine: Negative for cold intolerance and heat intolerance.   Genitourinary:  Negative for dysuria and hematuria.   Musculoskeletal:  Negative for arthralgias, gait problem and myalgias.   Skin:  Negative for pallor and rash.   Neurological:  Negative for dizziness, tremors, weakness, light-headedness and headaches.   Psychiatric/Behavioral:  " Negative for behavioral problems. The patient is not nervous/anxious.          Objective:  /70 (BP Location: Left arm, Patient Position: Sitting, Cuff Size: Large)   Pulse 91   Temp (!) 97.2 °F (36.2 °C)   Ht 4' 10\" (1.473 m)   Wt 95.7 kg (211 lb)   SpO2 99%   BMI 44.10 kg/m²        Physical Exam  Vitals and nursing note reviewed.   Constitutional:       General: She is not in acute distress.     Appearance: Normal appearance. She is obese. She is not ill-appearing or diaphoretic.   HENT:      Head: Normocephalic and atraumatic.   Eyes:      General: No scleral icterus.     Conjunctiva/sclera: Conjunctivae normal.      Pupils: Pupils are equal, round, and reactive to light.   Neck:      Thyroid: No thyromegaly.   Cardiovascular:      Rate and Rhythm: Normal rate and regular rhythm.      Pulses:           Carotid pulses are 0 on the right side and 0 on the left side.     Heart sounds: Normal heart sounds. No murmur heard.  Pulmonary:      Effort: Pulmonary effort is normal. No respiratory distress.      Breath sounds: Normal breath sounds. No wheezing.   Abdominal:      General: There is no distension.      Palpations: Abdomen is soft.   Musculoskeletal:      Cervical back: Normal range of motion and neck supple.      Right lower leg: No edema.      Left lower leg: No edema.   Lymphadenopathy:      Cervical: No cervical adenopathy.   Skin:     General: Skin is warm.      Coloration: Skin is not pale.   Neurological:      General: No focal deficit present.      Mental Status: She is alert and oriented to person, place, and time.      Cranial Nerves: No cranial nerve deficit.      Deep Tendon Reflexes: Reflexes are normal and symmetric.   Psychiatric:         Mood and Affect: Mood normal.         Behavior: Behavior normal.         Thought Content: Thought content normal.         Judgment: Judgment normal.           "

## 2024-01-11 ENCOUNTER — VBI (OUTPATIENT)
Dept: ADMINISTRATIVE | Facility: OTHER | Age: 49
End: 2024-01-11

## 2024-01-14 DIAGNOSIS — E66.01 MORBID OBESITY (HCC): ICD-10-CM

## 2024-01-14 DIAGNOSIS — E78.5 HYPERLIPIDEMIA, UNSPECIFIED HYPERLIPIDEMIA TYPE: ICD-10-CM

## 2024-01-15 RX ORDER — ROSUVASTATIN CALCIUM 5 MG/1
5 TABLET, COATED ORAL DAILY
Qty: 90 TABLET | Refills: 1 | Status: SHIPPED | OUTPATIENT
Start: 2024-01-15

## 2024-02-21 ENCOUNTER — HOSPITAL ENCOUNTER (OUTPATIENT)
Facility: HOSPITAL | Age: 49
Setting detail: OBSERVATION
Discharge: HOME | End: 2024-02-21
Attending: EMERGENCY MEDICINE | Admitting: HOSPITALIST
Payer: COMMERCIAL

## 2024-02-21 ENCOUNTER — APPOINTMENT (EMERGENCY)
Dept: CT IMAGING | Facility: HOSPITAL | Age: 49
End: 2024-02-21
Payer: COMMERCIAL

## 2024-02-21 ENCOUNTER — APPOINTMENT (OUTPATIENT)
Dept: NON INVASIVE DIAGNOSTICS | Facility: HOSPITAL | Age: 49
End: 2024-02-21
Payer: COMMERCIAL

## 2024-02-21 VITALS
WEIGHT: 209 LBS | HEART RATE: 70 BPM | BODY MASS INDEX: 43.87 KG/M2 | OXYGEN SATURATION: 96 % | RESPIRATION RATE: 21 BRPM | TEMPERATURE: 97.8 F | HEIGHT: 58 IN | SYSTOLIC BLOOD PRESSURE: 130 MMHG | DIASTOLIC BLOOD PRESSURE: 63 MMHG

## 2024-02-21 DIAGNOSIS — R79.89 ELEVATED TROPONIN: ICD-10-CM

## 2024-02-21 DIAGNOSIS — R07.9 CHEST PAIN: Primary | ICD-10-CM

## 2024-02-21 DIAGNOSIS — I10 ACCELERATED HYPERTENSION: ICD-10-CM

## 2024-02-21 PROBLEM — E11.9 CONTROLLED TYPE 2 DIABETES MELLITUS WITHOUT COMPLICATION, WITHOUT LONG-TERM CURRENT USE OF INSULIN (HCC): Chronic | Status: ACTIVE | Noted: 2017-05-05

## 2024-02-21 PROBLEM — G43.909 MIGRAINE WITHOUT STATUS MIGRAINOSUS, NOT INTRACTABLE: Chronic | Status: ACTIVE | Noted: 2021-09-28

## 2024-02-21 PROBLEM — J45.40 MODERATE PERSISTENT ASTHMA WITHOUT COMPLICATION: Chronic | Status: ACTIVE | Noted: 2023-12-20

## 2024-02-21 PROBLEM — E66.01 MORBID OBESITY (HCC): Chronic | Status: ACTIVE | Noted: 2021-03-11

## 2024-02-21 LAB
2HR DELTA HS TROPONIN: 17 NG/L
4HR DELTA HS TROPONIN: 41 NG/L
ALBUMIN SERPL BCP-MCNC: 4.4 G/DL (ref 3.5–5)
ALP SERPL-CCNC: 94 U/L (ref 34–104)
ALT SERPL W P-5'-P-CCNC: 14 U/L (ref 7–52)
ANION GAP SERPL CALCULATED.3IONS-SCNC: 8 MMOL/L
AORTIC ROOT: 3 CM
AORTIC VALVE MEAN VELOCITY: 10.6 M/S
APICAL FOUR CHAMBER EJECTION FRACTION: 64 %
AST SERPL W P-5'-P-CCNC: 12 U/L (ref 13–39)
ATRIAL RATE: 71 BPM
AV LVOT MEAN GRADIENT: 3 MMHG
AV LVOT PEAK GRADIENT: 6 MMHG
AV MEAN GRADIENT: 5 MMHG
AV PEAK GRADIENT: 10 MMHG
BASOPHILS # BLD AUTO: 0.04 THOUSANDS/ÂΜL (ref 0–0.1)
BASOPHILS NFR BLD AUTO: 0 % (ref 0–1)
BILIRUB SERPL-MCNC: 0.26 MG/DL (ref 0.2–1)
BSA FOR ECHO PROCEDURE: 1.86 M2
BUN SERPL-MCNC: 10 MG/DL (ref 5–25)
CALCIUM SERPL-MCNC: 9.3 MG/DL (ref 8.4–10.2)
CARDIAC TROPONIN I PNL SERPL HS: 31 NG/L
CARDIAC TROPONIN I PNL SERPL HS: 48 NG/L
CARDIAC TROPONIN I PNL SERPL HS: 72 NG/L
CHLORIDE SERPL-SCNC: 102 MMOL/L (ref 96–108)
CO2 SERPL-SCNC: 23 MMOL/L (ref 21–32)
CREAT SERPL-MCNC: 0.55 MG/DL (ref 0.6–1.3)
DOP CALC AO PEAK VEL: 1.57 M/S
DOP CALC AO VTI: 32.26 CM
DOP CALC LVOT PEAK VEL VTI: 26.31 CM
DOP CALC LVOT PEAK VEL: 1.23 M/S
E WAVE DECELERATION TIME: 250 MS
E/A RATIO: 1.28
EOSINOPHIL # BLD AUTO: 0.12 THOUSAND/ÂΜL (ref 0–0.61)
EOSINOPHIL NFR BLD AUTO: 1 % (ref 0–6)
ERYTHROCYTE [DISTWIDTH] IN BLOOD BY AUTOMATED COUNT: 15.4 % (ref 11.6–15.1)
FRACTIONAL SHORTENING: 37 (ref 28–44)
GFR SERPL CREATININE-BSD FRML MDRD: 112 ML/MIN/1.73SQ M
GLUCOSE SERPL-MCNC: 121 MG/DL (ref 65–140)
GLUCOSE SERPL-MCNC: 178 MG/DL (ref 65–140)
HCT VFR BLD AUTO: 39.9 % (ref 34.8–46.1)
HGB BLD-MCNC: 12.5 G/DL (ref 11.5–15.4)
IMM GRANULOCYTES # BLD AUTO: 0.08 THOUSAND/UL (ref 0–0.2)
IMM GRANULOCYTES NFR BLD AUTO: 1 % (ref 0–2)
INTERVENTRICULAR SEPTUM IN DIASTOLE (PARASTERNAL SHORT AXIS VIEW): 0.8 CM
INTERVENTRICULAR SEPTUM: 0.8 CM (ref 0.6–1.1)
LAAS-AP2: 17.6 CM2
LAAS-AP4: 18.3 CM2
LEFT ATRIUM SIZE: 3.7 CM
LEFT ATRIUM VOLUME (MOD BIPLANE): 50 ML
LEFT ATRIUM VOLUME INDEX (MOD BIPLANE): 26.9 ML/M2
LEFT INTERNAL DIMENSION IN SYSTOLE: 2.9 CM (ref 2.1–4)
LEFT VENTRICULAR INTERNAL DIMENSION IN DIASTOLE: 4.6 CM (ref 3.5–6)
LEFT VENTRICULAR POSTERIOR WALL IN END DIASTOLE: 0.8 CM
LEFT VENTRICULAR STROKE VOLUME: 65 ML
LVSV (TEICH): 65 ML
LYMPHOCYTES # BLD AUTO: 1.52 THOUSANDS/ÂΜL (ref 0.6–4.47)
LYMPHOCYTES NFR BLD AUTO: 11 % (ref 14–44)
MAGNESIUM SERPL-MCNC: 2.2 MG/DL (ref 1.9–2.7)
MCH RBC QN AUTO: 25.2 PG (ref 26.8–34.3)
MCHC RBC AUTO-ENTMCNC: 31.3 G/DL (ref 31.4–37.4)
MCV RBC AUTO: 80 FL (ref 82–98)
MONOCYTES # BLD AUTO: 0.41 THOUSAND/ÂΜL (ref 0.17–1.22)
MONOCYTES NFR BLD AUTO: 3 % (ref 4–12)
MV E'TISSUE VEL-SEP: 12 CM/S
MV PEAK A VEL: 0.79 M/S
MV PEAK E VEL: 101 CM/S
MV STENOSIS PRESSURE HALF TIME: 72 MS
MV VALVE AREA P 1/2 METHOD: 3.1
NEUTROPHILS # BLD AUTO: 11.2 THOUSANDS/ÂΜL (ref 1.85–7.62)
NEUTS SEG NFR BLD AUTO: 84 % (ref 43–75)
NRBC BLD AUTO-RTO: 0 /100 WBCS
P AXIS: 66 DEGREES
PLATELET # BLD AUTO: 393 THOUSANDS/UL (ref 149–390)
PMV BLD AUTO: 8.9 FL (ref 8.9–12.7)
POTASSIUM SERPL-SCNC: 3.6 MMOL/L (ref 3.5–5.3)
PR INTERVAL: 148 MS
PROCALCITONIN SERPL-MCNC: <0.05 NG/ML
PROT SERPL-MCNC: 7.8 G/DL (ref 6.4–8.4)
QRS AXIS: 34 DEGREES
QRSD INTERVAL: 84 MS
QT INTERVAL: 450 MS
QTC INTERVAL: 489 MS
RBC # BLD AUTO: 4.97 MILLION/UL (ref 3.81–5.12)
RIGHT ATRIUM AREA SYSTOLE A4C: 12.1 CM2
RIGHT VENTRICLE ID DIMENSION: 2.3 CM
SL CV LEFT ATRIUM LENGTH A2C: 5.2 CM
SL CV LV EF: 60
SL CV PED ECHO LEFT VENTRICLE DIASTOLIC VOLUME (MOD BIPLANE) 2D: 98 ML
SL CV PED ECHO LEFT VENTRICLE SYSTOLIC VOLUME (MOD BIPLANE) 2D: 34 ML
SODIUM SERPL-SCNC: 133 MMOL/L (ref 135–147)
T WAVE AXIS: 59 DEGREES
TR MAX PG: 21 MMHG
TR PEAK VELOCITY: 2.3 M/S
TRICUSPID ANNULAR PLANE SYSTOLIC EXCURSION: 1.8 CM
TRICUSPID VALVE PEAK REGURGITATION VELOCITY: 2.31 M/S
VENTRICULAR RATE: 71 BPM
WBC # BLD AUTO: 13.37 THOUSAND/UL (ref 4.31–10.16)

## 2024-02-21 PROCEDURE — 93306 TTE W/DOPPLER COMPLETE: CPT

## 2024-02-21 PROCEDURE — 93010 ELECTROCARDIOGRAM REPORT: CPT | Performed by: INTERNAL MEDICINE

## 2024-02-21 PROCEDURE — 96376 TX/PRO/DX INJ SAME DRUG ADON: CPT

## 2024-02-21 PROCEDURE — 84145 PROCALCITONIN (PCT): CPT | Performed by: PHYSICIAN ASSISTANT

## 2024-02-21 PROCEDURE — 83735 ASSAY OF MAGNESIUM: CPT | Performed by: EMERGENCY MEDICINE

## 2024-02-21 PROCEDURE — NC001 PR NO CHARGE: Performed by: HOSPITALIST

## 2024-02-21 PROCEDURE — 85025 COMPLETE CBC W/AUTO DIFF WBC: CPT | Performed by: EMERGENCY MEDICINE

## 2024-02-21 PROCEDURE — 71275 CT ANGIOGRAPHY CHEST: CPT

## 2024-02-21 PROCEDURE — 99285 EMERGENCY DEPT VISIT HI MDM: CPT

## 2024-02-21 PROCEDURE — 96374 THER/PROPH/DIAG INJ IV PUSH: CPT

## 2024-02-21 PROCEDURE — G1004 CDSM NDSC: HCPCS

## 2024-02-21 PROCEDURE — 82948 REAGENT STRIP/BLOOD GLUCOSE: CPT

## 2024-02-21 PROCEDURE — 36415 COLL VENOUS BLD VENIPUNCTURE: CPT | Performed by: EMERGENCY MEDICINE

## 2024-02-21 PROCEDURE — 80053 COMPREHEN METABOLIC PANEL: CPT | Performed by: EMERGENCY MEDICINE

## 2024-02-21 PROCEDURE — 99244 OFF/OP CNSLTJ NEW/EST MOD 40: CPT | Performed by: INTERNAL MEDICINE

## 2024-02-21 PROCEDURE — 99285 EMERGENCY DEPT VISIT HI MDM: CPT | Performed by: EMERGENCY MEDICINE

## 2024-02-21 PROCEDURE — 84484 ASSAY OF TROPONIN QUANT: CPT | Performed by: EMERGENCY MEDICINE

## 2024-02-21 PROCEDURE — 93005 ELECTROCARDIOGRAM TRACING: CPT

## 2024-02-21 PROCEDURE — 99236 HOSP IP/OBS SAME DATE HI 85: CPT | Performed by: HOSPITALIST

## 2024-02-21 PROCEDURE — 93306 TTE W/DOPPLER COMPLETE: CPT | Performed by: INTERNAL MEDICINE

## 2024-02-21 PROCEDURE — 74174 CTA ABD&PLVS W/CONTRAST: CPT

## 2024-02-21 RX ORDER — ATORVASTATIN CALCIUM 10 MG/1
10 TABLET, FILM COATED ORAL
Status: DISCONTINUED | OUTPATIENT
Start: 2024-02-21 | End: 2024-02-21 | Stop reason: HOSPADM

## 2024-02-21 RX ORDER — SODIUM CHLORIDE 9 MG/ML
3 INJECTION INTRAVENOUS
Status: DISCONTINUED | OUTPATIENT
Start: 2024-02-21 | End: 2024-02-21 | Stop reason: HOSPADM

## 2024-02-21 RX ORDER — ONDANSETRON 2 MG/ML
4 INJECTION INTRAMUSCULAR; INTRAVENOUS EVERY 6 HOURS PRN
Status: DISCONTINUED | OUTPATIENT
Start: 2024-02-21 | End: 2024-02-21 | Stop reason: HOSPADM

## 2024-02-21 RX ORDER — NICOTINE 21 MG/24HR
1 PATCH, TRANSDERMAL 24 HOURS TRANSDERMAL DAILY
Status: DISCONTINUED | OUTPATIENT
Start: 2024-02-21 | End: 2024-02-21 | Stop reason: HOSPADM

## 2024-02-21 RX ORDER — INSULIN LISPRO 100 [IU]/ML
1-6 INJECTION, SOLUTION INTRAVENOUS; SUBCUTANEOUS
Status: DISCONTINUED | OUTPATIENT
Start: 2024-02-21 | End: 2024-02-21 | Stop reason: HOSPADM

## 2024-02-21 RX ORDER — PANTOPRAZOLE SODIUM 20 MG/1
20 TABLET, DELAYED RELEASE ORAL
Status: DISCONTINUED | OUTPATIENT
Start: 2024-02-21 | End: 2024-02-21 | Stop reason: HOSPADM

## 2024-02-21 RX ORDER — METOPROLOL TARTRATE 1 MG/ML
5 INJECTION, SOLUTION INTRAVENOUS
Status: COMPLETED | OUTPATIENT
Start: 2024-02-21 | End: 2024-02-21

## 2024-02-21 RX ORDER — MONTELUKAST SODIUM 10 MG/1
10 TABLET ORAL
Status: DISCONTINUED | OUTPATIENT
Start: 2024-02-21 | End: 2024-02-21 | Stop reason: HOSPADM

## 2024-02-21 RX ORDER — MORPHINE SULFATE 4 MG/ML
4 INJECTION, SOLUTION INTRAMUSCULAR; INTRAVENOUS ONCE
Status: COMPLETED | OUTPATIENT
Start: 2024-02-21 | End: 2024-02-21

## 2024-02-21 RX ORDER — ENOXAPARIN SODIUM 100 MG/ML
40 INJECTION SUBCUTANEOUS 2 TIMES DAILY
Status: DISCONTINUED | OUTPATIENT
Start: 2024-02-21 | End: 2024-02-21 | Stop reason: HOSPADM

## 2024-02-21 RX ORDER — METOPROLOL SUCCINATE 50 MG/1
50 TABLET, EXTENDED RELEASE ORAL DAILY
Status: DISCONTINUED | OUTPATIENT
Start: 2024-02-21 | End: 2024-02-21 | Stop reason: HOSPADM

## 2024-02-21 RX ORDER — LOSARTAN POTASSIUM 25 MG/1
25 TABLET ORAL DAILY
Status: DISCONTINUED | OUTPATIENT
Start: 2024-02-21 | End: 2024-02-21 | Stop reason: HOSPADM

## 2024-02-21 RX ORDER — LORATADINE 10 MG/1
10 TABLET ORAL DAILY
Status: DISCONTINUED | OUTPATIENT
Start: 2024-02-21 | End: 2024-02-21 | Stop reason: HOSPADM

## 2024-02-21 RX ORDER — FLUTICASONE FUROATE AND VILANTEROL 200; 25 UG/1; UG/1
1 POWDER RESPIRATORY (INHALATION)
Status: DISCONTINUED | OUTPATIENT
Start: 2024-02-21 | End: 2024-02-21 | Stop reason: HOSPADM

## 2024-02-21 RX ORDER — TOPIRAMATE 25 MG/1
75 TABLET ORAL
Status: DISCONTINUED | OUTPATIENT
Start: 2024-02-21 | End: 2024-02-21 | Stop reason: HOSPADM

## 2024-02-21 RX ORDER — AMITRIPTYLINE HYDROCHLORIDE 10 MG/1
20 TABLET, FILM COATED ORAL
Status: DISCONTINUED | OUTPATIENT
Start: 2024-02-21 | End: 2024-02-21 | Stop reason: HOSPADM

## 2024-02-21 RX ORDER — ACETAMINOPHEN 325 MG/1
650 TABLET ORAL EVERY 6 HOURS PRN
Status: DISCONTINUED | OUTPATIENT
Start: 2024-02-21 | End: 2024-02-21 | Stop reason: HOSPADM

## 2024-02-21 RX ORDER — METOPROLOL SUCCINATE 50 MG/1
50 TABLET, EXTENDED RELEASE ORAL DAILY
Qty: 30 TABLET | Refills: 0 | Status: SHIPPED | OUTPATIENT
Start: 2024-02-22 | End: 2024-03-23

## 2024-02-21 RX ADMIN — NITROGLYCERIN 1 INCH: 20 OINTMENT TOPICAL at 02:44

## 2024-02-21 RX ADMIN — METOROPROLOL TARTRATE 5 MG: 5 INJECTION, SOLUTION INTRAVENOUS at 11:07

## 2024-02-21 RX ADMIN — MORPHINE SULFATE 4 MG: 4 INJECTION, SOLUTION INTRAMUSCULAR; INTRAVENOUS at 00:52

## 2024-02-21 RX ADMIN — FLUTICASONE FUROATE AND VILANTEROL TRIFENATATE 1 PUFF: 200; 25 POWDER RESPIRATORY (INHALATION) at 08:39

## 2024-02-21 RX ADMIN — ACETAMINOPHEN 650 MG: 325 TABLET ORAL at 10:38

## 2024-02-21 RX ADMIN — METOPROLOL SUCCINATE 50 MG: 50 TABLET, EXTENDED RELEASE ORAL at 10:36

## 2024-02-21 RX ADMIN — MORPHINE SULFATE 4 MG: 4 INJECTION, SOLUTION INTRAMUSCULAR; INTRAVENOUS at 02:42

## 2024-02-21 RX ADMIN — LORATADINE 10 MG: 10 TABLET ORAL at 08:45

## 2024-02-21 RX ADMIN — LOSARTAN POTASSIUM 25 MG: 25 TABLET, FILM COATED ORAL at 08:45

## 2024-02-21 RX ADMIN — METOROPROLOL TARTRATE 5 MG: 5 INJECTION, SOLUTION INTRAVENOUS at 10:34

## 2024-02-21 RX ADMIN — PANTOPRAZOLE SODIUM 20 MG: 20 TABLET, DELAYED RELEASE ORAL at 07:20

## 2024-02-21 RX ADMIN — IOHEXOL 100 ML: 350 INJECTION, SOLUTION INTRAVENOUS at 01:07

## 2024-02-21 RX ADMIN — ENOXAPARIN SODIUM 40 MG: 40 INJECTION SUBCUTANEOUS at 08:46

## 2024-02-21 NOTE — ED NOTES
Patient offered hospital bed and patient refused. Patients  laying on floor to sleep when rounding on patient, offered a recliner. Patients  also refused.     Talita Dick RN  02/21/24 [id number]

## 2024-02-21 NOTE — ASSESSMENT & PLAN NOTE
Lab Results   Component Value Date    HGBA1C 7.5 (H) 12/13/2023       Recent Labs     02/21/24  0723   POCGLU 121       Blood Sugar Average: Last 72 hrs:  (P) 121  Blood sugars are controlled, DC home on preadmit meds at preadmit doses

## 2024-02-21 NOTE — ASSESSMENT & PLAN NOTE
Low risk for ACS by description  EKG without ischemic changes, echo without wall motion abnormality  Consider other noncardiac causes
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
## 2024-02-21 NOTE — DISCHARGE INSTR - AVS FIRST PAGE
Dear Iris Curtis,     It was our pleasure to care for you here at [facility].  It is our hope that we were always able to exceed the expected standards for your care during your stay.  You were hospitalized due to chest pain.  Cardiac causes of chest pain were ruled out.  You were cared for on the medical/surgical floor by Horace Escalante MD with the [facility] who covers for your primary care physician (PCP), Antony Carvalho DO, while you were hospitalized.  You were additionally seen by Dr. Astno Coughlin of the [facility].  If you have any questions or concerns related to this hospitalization, you may contact us at .  For follow up as well as any medication refills, we recommend that you follow up with your primary care physician.  A registered nurse will reach out to you by phone within a few days after your discharge to answer any additional questions that you may have after going home.  However, at this time we provide for you here, the most important instructions / recommendations at discharge:     Notable Medication Adjustments -   New prescription Toprol-XL-50 mg take 1 tablet daily by mouth  Okay to resume all other preadmission medications at the preadmission doses  Testing Required after Discharge -   To be further determined in the outpatient setting by your PCP  Important follow up information -   Please follow-up with your PCP within 7 to 10 days  Other Instructions -   Please maintain a healthy low-sodium, diabetic diet  Please review this entire after visit summary as additional general instructions including medication list, appointments, activity, diet, any pertinent wound care, and other additional recommendations from your care team that may be provided for you.      Sincerely,     Horace Escalante MD
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
## 2024-02-21 NOTE — Clinical Note
Case was discussed with Zhanna and the patient's admission status was agreed to be Admission Status: observation status to the service of Dr. Meier .

## 2024-02-21 NOTE — ASSESSMENT & PLAN NOTE
Patient with back pain that radiated to her arms, started Monday and became more constant, had episode of vomiting, felt woozy.  History of diabetes, morbid obesity, hyperlipidemia, tobacco abuse  Rule out ACS, troponin 31, 2 h 48 w/ delta 17, 4h72 w/ delta 41  Cardiology consult  CTA chest abdomen pelvis: No aortic aneurysm, dissection or intramural hematoma. No infiltrate or pleural effusion. Small foci of atelectasis versus scarring at the medial right middle lobe and lingula. No acute intra-abdominal abnormality. No free air or free fluid.

## 2024-02-21 NOTE — ASSESSMENT & PLAN NOTE
"Lab Results   Component Value Date    HGBA1C 7.5 (H) 12/13/2023       No results for input(s): \"POCGLU\" in the last 72 hours.    Blood Sugar Average: Last 72 hrs:  Hold metformin had recent CTA  Sliding scale insulin coverage while in the hospital    "
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
## 2024-02-29 DIAGNOSIS — J45.40 MODERATE PERSISTENT ASTHMA WITHOUT COMPLICATION: ICD-10-CM

## 2024-02-29 RX ORDER — ALBUTEROL SULFATE 90 UG/1
AEROSOL, METERED RESPIRATORY (INHALATION)
Qty: 8.5 G | Refills: 1 | Status: SHIPPED | OUTPATIENT
Start: 2024-02-29

## 2024-03-18 ENCOUNTER — APPOINTMENT (OUTPATIENT)
Dept: LAB | Facility: CLINIC | Age: 49
End: 2024-03-18
Payer: COMMERCIAL

## 2024-03-18 DIAGNOSIS — E78.5 HYPERLIPIDEMIA, UNSPECIFIED HYPERLIPIDEMIA TYPE: ICD-10-CM

## 2024-03-18 DIAGNOSIS — E66.01 MORBID OBESITY (HCC): ICD-10-CM

## 2024-03-18 LAB
ALBUMIN SERPL BCP-MCNC: 4 G/DL (ref 3.5–5)
ALP SERPL-CCNC: 85 U/L (ref 34–104)
ALT SERPL W P-5'-P-CCNC: 10 U/L (ref 7–52)
ANION GAP SERPL CALCULATED.3IONS-SCNC: 10 MMOL/L (ref 4–13)
AST SERPL W P-5'-P-CCNC: 12 U/L (ref 13–39)
BILIRUB SERPL-MCNC: 0.28 MG/DL (ref 0.2–1)
BUN SERPL-MCNC: 9 MG/DL (ref 5–25)
CALCIUM SERPL-MCNC: 9.2 MG/DL (ref 8.4–10.2)
CHLORIDE SERPL-SCNC: 105 MMOL/L (ref 96–108)
CHOLEST SERPL-MCNC: 183 MG/DL
CO2 SERPL-SCNC: 23 MMOL/L (ref 21–32)
CREAT SERPL-MCNC: 0.62 MG/DL (ref 0.6–1.3)
EST. AVERAGE GLUCOSE BLD GHB EST-MCNC: 169 MG/DL
GFR SERPL CREATININE-BSD FRML MDRD: 108 ML/MIN/1.73SQ M
GLUCOSE P FAST SERPL-MCNC: 146 MG/DL (ref 65–99)
HBA1C MFR BLD: 7.5 %
HDLC SERPL-MCNC: 42 MG/DL
LDLC SERPL CALC-MCNC: 105 MG/DL (ref 0–100)
POTASSIUM SERPL-SCNC: 4.5 MMOL/L (ref 3.5–5.3)
PROT SERPL-MCNC: 6.8 G/DL (ref 6.4–8.4)
SODIUM SERPL-SCNC: 138 MMOL/L (ref 135–147)
TRIGL SERPL-MCNC: 182 MG/DL

## 2024-03-18 PROCEDURE — 80061 LIPID PANEL: CPT

## 2024-03-18 PROCEDURE — 83036 HEMOGLOBIN GLYCOSYLATED A1C: CPT

## 2024-03-18 PROCEDURE — 80053 COMPREHEN METABOLIC PANEL: CPT

## 2024-03-18 PROCEDURE — 36415 COLL VENOUS BLD VENIPUNCTURE: CPT

## 2024-03-19 ENCOUNTER — OFFICE VISIT (OUTPATIENT)
Dept: FAMILY MEDICINE CLINIC | Facility: CLINIC | Age: 49
End: 2024-03-19
Payer: COMMERCIAL

## 2024-03-19 VITALS
OXYGEN SATURATION: 97 % | HEART RATE: 94 BPM | HEIGHT: 58 IN | SYSTOLIC BLOOD PRESSURE: 112 MMHG | BODY MASS INDEX: 43.66 KG/M2 | TEMPERATURE: 98.1 F | DIASTOLIC BLOOD PRESSURE: 68 MMHG | WEIGHT: 208 LBS

## 2024-03-19 DIAGNOSIS — J30.2 SEASONAL ALLERGIES: ICD-10-CM

## 2024-03-19 DIAGNOSIS — E55.9 VITAMIN D DEFICIENCY: ICD-10-CM

## 2024-03-19 DIAGNOSIS — E78.2 MIXED HYPERLIPIDEMIA: ICD-10-CM

## 2024-03-19 DIAGNOSIS — I10 ACCELERATED HYPERTENSION: ICD-10-CM

## 2024-03-19 DIAGNOSIS — G43.909 MIGRAINE WITHOUT STATUS MIGRAINOSUS, NOT INTRACTABLE, UNSPECIFIED MIGRAINE TYPE: ICD-10-CM

## 2024-03-19 DIAGNOSIS — E78.5 HYPERLIPIDEMIA, UNSPECIFIED HYPERLIPIDEMIA TYPE: ICD-10-CM

## 2024-03-19 DIAGNOSIS — K21.9 GASTROESOPHAGEAL REFLUX DISEASE: ICD-10-CM

## 2024-03-19 DIAGNOSIS — F41.9 ANXIETY: ICD-10-CM

## 2024-03-19 DIAGNOSIS — J45.40 MODERATE PERSISTENT ASTHMA WITHOUT COMPLICATION: ICD-10-CM

## 2024-03-19 DIAGNOSIS — E11.9 TYPE 2 DIABETES MELLITUS WITHOUT COMPLICATION, WITHOUT LONG-TERM CURRENT USE OF INSULIN (HCC): Primary | ICD-10-CM

## 2024-03-19 DIAGNOSIS — E66.01 MORBID OBESITY (HCC): ICD-10-CM

## 2024-03-19 PROCEDURE — 99214 OFFICE O/P EST MOD 30 MIN: CPT | Performed by: NURSE PRACTITIONER

## 2024-03-19 RX ORDER — OMEPRAZOLE 20 MG/1
20 CAPSULE, DELAYED RELEASE ORAL DAILY
Qty: 90 CAPSULE | Refills: 1 | Status: SHIPPED | OUTPATIENT
Start: 2024-03-19

## 2024-03-19 RX ORDER — CETIRIZINE HYDROCHLORIDE 10 MG/1
10 TABLET ORAL DAILY
Qty: 90 TABLET | Refills: 1 | Status: SHIPPED | OUTPATIENT
Start: 2024-03-19

## 2024-03-19 RX ORDER — METOPROLOL SUCCINATE 50 MG/1
50 TABLET, EXTENDED RELEASE ORAL DAILY
Qty: 30 TABLET | Refills: 0 | Status: SHIPPED | OUTPATIENT
Start: 2024-03-19 | End: 2024-04-18

## 2024-03-19 RX ORDER — AMITRIPTYLINE HYDROCHLORIDE 10 MG/1
20 TABLET, FILM COATED ORAL
Qty: 180 TABLET | Refills: 1 | Status: SHIPPED | OUTPATIENT
Start: 2024-03-19

## 2024-03-19 RX ORDER — ALBUTEROL SULFATE 90 UG/1
2 AEROSOL, METERED RESPIRATORY (INHALATION) EVERY 6 HOURS PRN
Qty: 8.5 G | Refills: 1 | Status: SHIPPED | OUTPATIENT
Start: 2024-03-19

## 2024-03-19 RX ORDER — FLUTICASONE PROPIONATE 50 MCG
2 SPRAY, SUSPENSION (ML) NASAL DAILY
Qty: 48 ML | Refills: 1 | Status: SHIPPED | OUTPATIENT
Start: 2024-03-19

## 2024-03-19 RX ORDER — ALBUTEROL SULFATE 2.5 MG/3ML
2.5 SOLUTION RESPIRATORY (INHALATION) EVERY 4 HOURS PRN
Qty: 75 ML | Refills: 1 | Status: SHIPPED | OUTPATIENT
Start: 2024-03-19

## 2024-03-19 RX ORDER — METFORMIN HYDROCHLORIDE 500 MG/1
1000 TABLET, EXTENDED RELEASE ORAL
Qty: 180 TABLET | Refills: 1 | Status: SHIPPED | OUTPATIENT
Start: 2024-03-19

## 2024-03-19 RX ORDER — MELATONIN
1000 DAILY
Qty: 90 TABLET | Refills: 1 | Status: SHIPPED | OUTPATIENT
Start: 2024-03-19

## 2024-03-19 RX ORDER — ROSUVASTATIN CALCIUM 10 MG/1
10 TABLET, COATED ORAL DAILY
Qty: 90 TABLET | Refills: 1 | Status: SHIPPED | OUTPATIENT
Start: 2024-03-19

## 2024-03-19 RX ORDER — BUSPIRONE HYDROCHLORIDE 5 MG/1
TABLET ORAL DAILY
Qty: 60 TABLET | Refills: 0 | Status: SHIPPED | OUTPATIENT
Start: 2024-03-19 | End: 2024-04-18

## 2024-03-19 RX ORDER — FLUTICASONE PROPIONATE AND SALMETEROL 250; 50 UG/1; UG/1
1 POWDER RESPIRATORY (INHALATION) 2 TIMES DAILY
Qty: 60 BLISTER | Refills: 5 | Status: SHIPPED | OUTPATIENT
Start: 2024-03-19 | End: 2024-03-25

## 2024-03-19 RX ORDER — LOSARTAN POTASSIUM 25 MG/1
25 TABLET ORAL DAILY
Qty: 90 TABLET | Refills: 1 | Status: SHIPPED | OUTPATIENT
Start: 2024-03-19

## 2024-03-19 RX ORDER — TOPIRAMATE 25 MG/1
75 TABLET ORAL
Qty: 270 TABLET | Refills: 1 | Status: SHIPPED | OUTPATIENT
Start: 2024-03-19 | End: 2024-07-17

## 2024-03-19 RX ORDER — MONTELUKAST SODIUM 10 MG/1
10 TABLET ORAL
Qty: 90 TABLET | Refills: 1 | Status: SHIPPED | OUTPATIENT
Start: 2024-03-19

## 2024-03-19 NOTE — PATIENT INSTRUCTIONS
Increase Metformin to 1,000 mg daily extended release tablet.     Increase Crestor to 10 mg daily.     Recheck labs in 3 months.     Start medication for anxiety, this is Buspar. Start one pill daily for 3 days, if well tolerated then increase to one pill twice a day routinely for a few weeks. You can continue with just one pill daily if needed in cases where your body is taking longer to adjust to it. We will then follow up to see how you are doing. Medication can then be increased if needed at that time. Follow up in 3-4 weeks after starting medication.     Please call the office if you are experiencing any worsening of symptoms or no symptom improvement.     Buspirone (By mouth)   Buspirone ([id number])  Treats anxiety.   Brand Name(s):   There may be other brand names for this medicine.  When This Medicine Should Not Be Used:   You should not use this medicine if you have had an allergic reaction to buspirone.   How to Use This Medicine:   Tablet  Your doctor will tell you how much of this medicine to take and how often. Do not take more medicine or take it more often than your doctor tells you to.  You may take this medicine with or without food, but take it the same way each time.  You may need to take this medicine for 1 or 2 weeks before you begin to feel better.  If a dose is missed:   If you miss a dose or forget to take your medicine, take it as soon as you can. If it is almost time for your next dose, wait until then to take the medicine and skip the missed dose.  Do not use extra medicine to make up for a missed dose.  How to Store and Dispose of This Medicine:   Store the medicine at room temperature, away from heat, moisture, and direct light.  Keep all medicine out of the reach of children and never share your medicine with anyone.  Drugs and Foods to Avoid:   Ask your doctor or pharmacist before using any other medicine, including over-the-counter medicines, vitamins, and herbal products.  You  should not use buspirone when you are also using an MAO inhibitor (such as Eldepryl®, Marplan®, Nardil®, Parnate®).  Do not eat grapefruit, drink grapefruit juice, or drink alcohol while you are using buspirone.  Make sure your doctor knows if you are also using cimetidine (Tagamet®), dexamethasone (Decadron®), diltiazem (Cardizem®, Tiazac®), erythromycin (Erythro-Tab®), itraconazole (Sporanox®), nefazodone (Serzone®), rifampin (Rifadin®, Rifamate®, Rifater®), verapamil (Calan®, Covera®), or medicine for seizures (such as Dilantin®, Luminal®, Tegretol®).  Make sure your doctor knows if you are using any medicines that make you sleepy (such as sleeping pills, cold and allergy medicine, narcotic pain relievers, or sedatives).  Warnings While Using This Medicine:   Make sure your doctor knows if you are pregnant or breastfeeding, or if you have kidney or liver disease.  Do not stop using this medicine suddenly without asking your doctor. You may need to slowly decrease your dose before stopping it completely.  This medicine may make you dizzy or drowsy. Avoid driving, using machines, or doing anything else that could be dangerous if you are not alert.  Possible Side Effects While Using This Medicine:   Call your doctor right away if you notice any of these side effects:  Fast or pounding heartbeat  Numbness or tingling feeling  Tremors or shaking  If you notice these less serious side effects, talk with your doctor:   Drowsiness or weakness  Dry mouth  Feeling restless or nervous, trouble sleeping  Headache  Nausea, constipation, upset stomach  If you notice other side effects that you think are caused by this medicine, tell your doctor.   Call your doctor for medical advice about side effects. You may report side effects to FDA at 2-246-FDA-0769  © 2017 [employer] Information is for End User's use only and may not be sold, redistributed or otherwise used for commercial purposes.  The above information  is an  only. It is not intended as medical advice for individual conditions or treatments. Talk to your doctor, nurse or pharmacist before following any medical regimen to see if it is safe and effective for you.      Please call the office if you are experiencing any worsening of symptoms or no symptom improvement.

## 2024-03-19 NOTE — ASSESSMENT & PLAN NOTE
Cholesterol panel greatly improved but LDL not at goal.   Lab Results   Component Value Date    LDLCALC 105 (H) 03/18/2024     Increase Crestor to 10 mg daily. Recheck labs in 3 months. Tolerating med well

## 2024-03-19 NOTE — ASSESSMENT & PLAN NOTE
Restart daily advair. Continue with albuterol PRN. Please call the office if you are experiencing any worsening of symptoms or no symptom improvement.

## 2024-03-19 NOTE — PROGRESS NOTES
Name: Iris Curtis      : 1975      MRN: [id number]  Encounter Provider: MARGUERITE Patel  Encounter Date: 3/19/2024   Encounter department: [facility]    Assessment & Plan     1. Type 2 diabetes mellitus without complication, without long-term current use of insulin (HCC)  Assessment & Plan:  Not at goal. Didn't tolerate metformin BID. Will try metformin XR 1000mg daily.   Recheck in 3 months  Lab Results   Component Value Date    HGBA1C 7.5 (H) 2024       Orders:  -     losartan (COZAAR) 25 mg tablet; Take 1 tablet (25 mg total) by mouth daily  -     metFORMIN (GLUCOPHAGE-XR) 500 mg 24 hr tablet; Take 2 tablets (1,000 mg total) by mouth daily with dinner  -     Hemoglobin A1C; Future; Expected date: 2024    2. Accelerated hypertension  -     metoprolol succinate (TOPROL-XL) 50 mg 24 hr tablet; Take 1 tablet (50 mg total) by mouth daily    3. Moderate persistent asthma without complication  Assessment & Plan:  Restart daily advair. Continue with albuterol PRN. Please call the office if you are experiencing any worsening of symptoms or no symptom improvement.       Orders:  -     Fluticasone-Salmeterol (Advair Diskus) 250-50 mcg/dose inhaler; Inhale 1 puff 2 (two) times a day Rinse mouth after use  -     albuterol (2.5 mg/3 mL) 0.083 % nebulizer solution; Take 3 mL (2.5 mg total) by nebulization every 4 (four) hours as needed for wheezing  -     albuterol (PROVENTIL HFA,VENTOLIN HFA) 90 mcg/act inhaler; Inhale 2 puffs every 6 (six) hours as needed for wheezing  -     montelukast (SINGULAIR) 10 mg tablet; Take 1 tablet (10 mg total) by mouth daily at bedtime    4. Morbid obesity (HCC)  Assessment & Plan:  Wt Readings from Last 3 Encounters:   24 94.3 kg (208 lb)   24 94.8 kg (209 lb)   23 95.7 kg (211 lb)   Encouraged diet/exercise.    Orders:  -     rosuvastatin (CRESTOR) 10 MG tablet; Take 1 tablet (10 mg total) by mouth daily    5.  Hyperlipidemia, unspecified hyperlipidemia type  Assessment & Plan:  Cholesterol panel greatly improved but LDL not at goal.   Lab Results   Component Value Date    LDLCALC 105 (H) 03/18/2024     Increase Crestor to 10 mg daily. Recheck labs in 3 months. Tolerating med well    Orders:  -     rosuvastatin (CRESTOR) 10 MG tablet; Take 1 tablet (10 mg total) by mouth daily    6. Migraine without status migrainosus, not intractable, unspecified migraine type  Assessment & Plan:  Stable on topamax 75 mg at bedtime elavil and uses zomig PRN    Orders:  -     amitriptyline (ELAVIL) 10 mg tablet; Take 2 tablets (20 mg total) by mouth daily at bedtime  -     topiramate (TOPAMAX) 25 mg tablet; Take 3 tablets (75 mg total) by mouth daily at bedtime    7. Seasonal allergies  Assessment & Plan:  Re-start inhalers. Continue with flonase zyrtec and singulair    Orders:  -     cetirizine (ZyrTEC) 10 mg tablet; Take 1 tablet (10 mg total) by mouth daily  -     fluticasone (FLONASE) 50 mcg/act nasal spray; 2 sprays into each nostril daily  -     montelukast (SINGULAIR) 10 mg tablet; Take 1 tablet (10 mg total) by mouth daily at bedtime    8. Vitamin D deficiency  Assessment & Plan:  refilled    Orders:  -     cholecalciferol (VITAMIN D3) 1,000 units tablet; Take 1 tablet (1,000 Units total) by mouth daily    9. Gastroesophageal reflux disease  Assessment & Plan:  Stable on omeprazole     Orders:  -     omeprazole (PriLOSEC) 20 mg delayed release capsule; Take 1 capsule (20 mg total) by mouth daily    10. Anxiety  Assessment & Plan:  Start medication for anxiety, this is Buspar. Start one pill daily for 3 days, if well tolerated then increase to one pill twice a day routinely for a few weeks. You can continue with just one pill daily if needed in cases where your body is taking longer to adjust to it. We will then follow up to see how you are doing. Medication can then be increased if needed at that time. Follow up in 3-4 weeks after  starting medication.   Please call the office if you are experiencing any worsening of symptoms or no symptom improvement.       Orders:  -     busPIRone (BUSPAR) 5 mg tablet; Take 1 tablet (5 mg total) by mouth daily for 3 days, THEN 1 tablet (5 mg total) 2 (two) times a day for 27 days.    11. Mixed hyperlipidemia  Assessment & Plan:  Cholesterol panel greatly improved but LDL not at goal.   Lab Results   Component Value Date    LDLCALC 105 (H) 03/18/2024     Increase Crestor to 10 mg daily. Recheck labs in 3 months. Tolerating med well    Orders:  -     Comprehensive metabolic panel; Future; Expected date: 06/19/2024  -     Lipid panel; Future; Expected date: 06/19/2024               Subjective      Patient presents with:  Diabetes: Follow up   Labs done to be reviewed. Would like to discuss anxiety.   Allergies starting up so asthma is starting up and has been wheezing. Doesn't have advair. Requesting refills.   Has been doing Metformin once daily 500 mg. BID gave her diarrhea.   Lab Results       Component                Value               Date                       HGBA1C                   7.5 (H)             03/18/2024            Lab Results       Component                Value               Date                       LDLCALC                  105 (H)             03/18/2024                Diabetes  Pertinent negatives for hypoglycemia include no headaches or nervousness/anxiousness. Pertinent negatives for diabetes include no chest pain.     Review of Systems   Constitutional:  Negative for chills and fever.   Eyes:  Negative for discharge.   Respiratory:  Positive for cough and wheezing. Negative for shortness of breath.    Cardiovascular:  Negative for chest pain.   Gastrointestinal:  Negative for constipation and diarrhea.   Genitourinary:  Negative for difficulty urinating.   Musculoskeletal:  Negative for joint swelling.   Skin:  Negative for rash.   Neurological:  Negative for headaches.    Hematological:  Negative for adenopathy.   Psychiatric/Behavioral:  The patient is not nervous/anxious.        Current Outpatient Medications on File Prior to Visit   Medication Sig    Alcaftadine (Lastacaft) 0.25 % SOLN Administer 1 drop to both eyes daily as needed (allergic eye symptoms)    azelastine (OPTIVAR) 0.05 % ophthalmic solution INSTILL 1 DROP INTO BOTH EYES TWICE A DAY    nystatin (MYCOSTATIN) powder Apply topically 3 (three) times a day    ZOLMitriptan (Zomig) 5 MG tablet Take 1 tablet at onset of headache. Max 2 tablets per week.    [DISCONTINUED] Advair Diskus 250-50 MCG/ACT inhaler INHALE 1 DOSE BY MOUTH TWICE DAILY. RINSE MOUTH AFTER USE    [DISCONTINUED] albuterol (2.5 mg/3 mL) 0.083 % nebulizer solution Take 3 mL (2.5 mg total) by nebulization every 4 (four) hours as needed for wheezing    [DISCONTINUED] albuterol (PROVENTIL HFA,VENTOLIN HFA) 90 mcg/act inhaler INHALE 1-2 PUFFS EVERY 4 HOURS AS NEEDED FOR WHEEZING.    [DISCONTINUED] amitriptyline (ELAVIL) 10 mg tablet Take 2 tablets (20 mg total) by mouth daily at bedtime    [DISCONTINUED] cetirizine (ZyrTEC) 10 mg tablet Take 1 tablet (10 mg total) by mouth daily    [DISCONTINUED] cholecalciferol (VITAMIN D3) 1,000 units tablet Take 1 tablet (1,000 Units total) by mouth daily    [DISCONTINUED] fluticasone (FLONASE) 50 mcg/act nasal spray 2 sprays into each nostril daily    [DISCONTINUED] losartan (COZAAR) 25 mg tablet Take 1 tablet (25 mg total) by mouth daily    [DISCONTINUED] metFORMIN (GLUCOPHAGE) 500 mg tablet TAKE 1 TABLET BY MOUTH TWICE A DAY WITH MEALS    [DISCONTINUED] metoprolol succinate (TOPROL-XL) 50 mg 24 hr tablet Take 1 tablet (50 mg total) by mouth daily    [DISCONTINUED] montelukast (SINGULAIR) 10 mg tablet Take 1 tablet (10 mg total) by mouth daily at bedtime    [DISCONTINUED] omeprazole (PriLOSEC) 20 mg delayed release capsule Take 1 capsule (20 mg total) by mouth daily    [DISCONTINUED] rosuvastatin (CRESTOR) 5 mg tablet  "TAKE 1 TABLET (5 MG TOTAL) BY MOUTH DAILY.    [DISCONTINUED] topiramate (TOPAMAX) 25 mg tablet Take 3 tablets (75 mg total) by mouth daily at bedtime       Objective     /68 (BP Location: Left arm, Patient Position: Sitting, Cuff Size: Large)   Pulse 94   Temp 98.1 °F (36.7 °C) (Temporal)   Ht 4' 10\" (1.473 m)   Wt 94.3 kg (208 lb)   SpO2 97%   BMI 43.47 kg/m²     Physical Exam  Vitals and nursing note reviewed.   Constitutional:       General: She is not in acute distress.     Appearance: Normal appearance. She is well-developed. She is obese. She is not diaphoretic.   HENT:      Head: Normocephalic and atraumatic.      Right Ear: External ear normal.      Left Ear: External ear normal.   Eyes:      General: Lids are normal.         Right eye: No discharge.         Left eye: No discharge.      Conjunctiva/sclera: Conjunctivae normal.   Cardiovascular:      Rate and Rhythm: Normal rate and regular rhythm.      Heart sounds: No murmur heard.  Pulmonary:      Effort: Pulmonary effort is normal. No respiratory distress.      Breath sounds: Wheezing (Right upper expiratory-slight) present.   Musculoskeletal:         General: No deformity.   Skin:     General: Skin is warm and dry.   Neurological:      General: No focal deficit present.      Mental Status: She is alert and oriented to person, place, and time.   Psychiatric:         Speech: Speech normal.         Behavior: Behavior normal.         Thought Content: Thought content normal.         Judgment: Judgment normal.       MARGUERITE Patel    "

## 2024-03-20 ENCOUNTER — TELEPHONE (OUTPATIENT)
Age: 49
End: 2024-03-20

## 2024-03-20 DIAGNOSIS — J45.40 MODERATE PERSISTENT ASTHMA WITHOUT COMPLICATION: Primary | Chronic | ICD-10-CM

## 2024-03-20 NOTE — ASSESSMENT & PLAN NOTE
Start medication for anxiety, this is Buspar. Start one pill daily for 3 days, if well tolerated then increase to one pill twice a day routinely for a few weeks. You can continue with just one pill daily if needed in cases where your body is taking longer to adjust to it. We will then follow up to see how you are doing. Medication can then be increased if needed at that time. Follow up in 3-4 weeks after starting medication.   Please call the office if you are experiencing any worsening of symptoms or no symptom improvement.

## 2024-03-20 NOTE — ASSESSMENT & PLAN NOTE
Wt Readings from Last 3 Encounters:   03/19/24 94.3 kg (208 lb)   02/21/24 94.8 kg (209 lb)   12/20/23 95.7 kg (211 lb)   Encouraged diet/exercise.

## 2024-03-20 NOTE — TELEPHONE ENCOUNTER
PA for Fluticasone-Salmeterol (Advair Diskus) 250-50 mcg/dose inhaler     Submitted via    [][employer]   [x][employer]-Case ID # [id number]   []Faxed to plan   []Other website   []Phone call Case ID #     Office notes sent, clinical questions answered. Awaiting determination    Turnaround time for your insurance to make a decision on your Prior Authorization can take 7-21 business days.

## 2024-03-20 NOTE — ASSESSMENT & PLAN NOTE
Not at goal. Didn't tolerate metformin BID. Will try metformin XR 1000mg daily.   Recheck in 3 months  Lab Results   Component Value Date    HGBA1C 7.5 (H) 03/18/2024

## 2024-03-22 NOTE — TELEPHONE ENCOUNTER
PA for  Fluticasone-Salmeterol (Advair Diskus) 250-50 mcg/dose inhaler  Denied    Reason:                  Message sent to provider pool Yes    Denial letter scanned into Media Yes    Appeal started No

## 2024-03-25 RX ORDER — FLUTICASONE PROPIONATE AND SALMETEROL 250; 50 UG/1; UG/1
1 POWDER RESPIRATORY (INHALATION) 2 TIMES DAILY
Qty: 60 BLISTER | Refills: 5 | Status: SHIPPED | OUTPATIENT
Start: 2024-03-25

## 2024-04-14 DIAGNOSIS — I10 ACCELERATED HYPERTENSION: ICD-10-CM

## 2024-04-14 DIAGNOSIS — F41.9 ANXIETY: ICD-10-CM

## 2024-04-15 RX ORDER — BUSPIRONE HYDROCHLORIDE 5 MG/1
5 TABLET ORAL 2 TIMES DAILY
Qty: 180 TABLET | Refills: 0 | Status: SHIPPED | OUTPATIENT
Start: 2024-04-15 | End: 2024-07-14

## 2024-04-15 RX ORDER — METOPROLOL SUCCINATE 50 MG/1
50 TABLET, EXTENDED RELEASE ORAL DAILY
Qty: 90 TABLET | Refills: 1 | Status: SHIPPED | OUTPATIENT
Start: 2024-04-15

## 2024-05-11 DIAGNOSIS — G43.909 MIGRAINE WITHOUT STATUS MIGRAINOSUS, NOT INTRACTABLE, UNSPECIFIED MIGRAINE TYPE: ICD-10-CM

## 2024-05-13 RX ORDER — TOPIRAMATE 25 MG/1
75 TABLET ORAL
Qty: 270 TABLET | Refills: 1 | Status: SHIPPED | OUTPATIENT
Start: 2024-05-13 | End: 2024-09-10

## 2024-05-14 ENCOUNTER — OFFICE VISIT (OUTPATIENT)
Dept: FAMILY MEDICINE CLINIC | Facility: CLINIC | Age: 49
End: 2024-05-14
Payer: COMMERCIAL

## 2024-05-14 VITALS
OXYGEN SATURATION: 98 % | TEMPERATURE: 97.4 F | WEIGHT: 215.6 LBS | HEIGHT: 58 IN | DIASTOLIC BLOOD PRESSURE: 80 MMHG | BODY MASS INDEX: 45.25 KG/M2 | SYSTOLIC BLOOD PRESSURE: 130 MMHG | HEART RATE: 78 BPM

## 2024-05-14 DIAGNOSIS — F41.9 ANXIETY: Primary | ICD-10-CM

## 2024-05-14 DIAGNOSIS — E11.9 TYPE 2 DIABETES MELLITUS WITHOUT COMPLICATION, WITHOUT LONG-TERM CURRENT USE OF INSULIN (HCC): ICD-10-CM

## 2024-05-14 LAB
LEFT EYE DIABETIC RETINOPATHY: NORMAL
LEFT EYE IMAGE QUALITY: NORMAL
LEFT EYE MACULAR EDEMA: NORMAL
LEFT EYE OTHER RETINOPATHY: NORMAL
RIGHT EYE DIABETIC RETINOPATHY: NORMAL
RIGHT EYE IMAGE QUALITY: NORMAL
RIGHT EYE MACULAR EDEMA: NORMAL
RIGHT EYE OTHER RETINOPATHY: NORMAL
SEVERITY (EYE EXAM): NORMAL

## 2024-05-14 PROCEDURE — 99214 OFFICE O/P EST MOD 30 MIN: CPT | Performed by: NURSE PRACTITIONER

## 2024-05-14 NOTE — PROGRESS NOTES
Name: Iris Curtis      : 1975      MRN: [id number]  Encounter Provider: MARGUERITE Patel  Encounter Date: 2024   Encounter department: [facility]    Assessment & Plan     1. Anxiety    2. Type 2 diabetes mellitus without complication, without long-term current use of insulin (HCC)  -     IRIS Diabetic eye exam      Stable on Buspar- continue with same.   Advised patient that I did send medications for 90 days with refills and showed her amounts. She will need to contact insurance to see where they will fulfill 90 day supplies for her and let us know and we can send them there.   DM eye exam done today.   Labs previously ordered to be done prior to next appointment- 1 month f/u for well exam/recheck.     Subjective      Here for follow up/ recheck.   Last seen 3/19. At that time multiple medication adjustments made.   Metformin to 1,000 mg daily extended release tablet.   Increased Crestor to 10 mg daily.   Recheck labs in 3 months.   Start medication for anxiety, this is Buspar titrating to 5 mg BID.   Repeat labs ordered not yet completed.   She is overall feeling better on the Buspar, usually doing once daily, some days twice daily.             Review of Systems   Constitutional:  Negative for chills and fever.   Eyes:  Negative for discharge.   Respiratory:  Negative for shortness of breath.    Cardiovascular:  Negative for chest pain.   Gastrointestinal:  Negative for constipation and diarrhea.   Genitourinary:  Negative for difficulty urinating.   Musculoskeletal:  Negative for joint swelling.   Skin:  Negative for rash.   Neurological:  Negative for headaches.   Hematological:  Negative for adenopathy.   Psychiatric/Behavioral:  The patient is not nervous/anxious.        Current Outpatient Medications on File Prior to Visit   Medication Sig   • albuterol (2.5 mg/3 mL) 0.083 % nebulizer solution Take 3 mL (2.5 mg total) by nebulization every 4 (four) hours as  "needed for wheezing   • albuterol (PROVENTIL HFA,VENTOLIN HFA) 90 mcg/act inhaler Inhale 2 puffs every 6 (six) hours as needed for wheezing   • Alcaftadine (Lastacaft) 0.25 % SOLN Administer 1 drop to both eyes daily as needed (allergic eye symptoms)   • amitriptyline (ELAVIL) 10 mg tablet Take 2 tablets (20 mg total) by mouth daily at bedtime   • azelastine (OPTIVAR) 0.05 % ophthalmic solution INSTILL 1 DROP INTO BOTH EYES TWICE A DAY   • busPIRone (BUSPAR) 5 mg tablet Take 1 tablet (5 mg total) by mouth 2 (two) times a day   • cetirizine (ZyrTEC) 10 mg tablet Take 1 tablet (10 mg total) by mouth daily   • cholecalciferol (VITAMIN D3) 1,000 units tablet Take 1 tablet (1,000 Units total) by mouth daily   • fluticasone (FLONASE) 50 mcg/act nasal spray 2 sprays into each nostril daily   • Fluticasone-Salmeterol (Wixela Inhub) 250-50 mcg/dose inhaler Inhale 1 puff 2 (two) times a day Rinse mouth after use.   • losartan (COZAAR) 25 mg tablet Take 1 tablet (25 mg total) by mouth daily   • metFORMIN (GLUCOPHAGE-XR) 500 mg 24 hr tablet Take 2 tablets (1,000 mg total) by mouth daily with dinner   • metoprolol succinate (TOPROL-XL) 50 mg 24 hr tablet TAKE 1 TABLET BY MOUTH EVERY DAY   • montelukast (SINGULAIR) 10 mg tablet Take 1 tablet (10 mg total) by mouth daily at bedtime   • nystatin (MYCOSTATIN) powder Apply topically 3 (three) times a day   • omeprazole (PriLOSEC) 20 mg delayed release capsule Take 1 capsule (20 mg total) by mouth daily   • rosuvastatin (CRESTOR) 10 MG tablet Take 1 tablet (10 mg total) by mouth daily   • topiramate (TOPAMAX) 25 mg tablet TAKE 3 TABLETS (75 MG TOTAL) BY MOUTH DAILY AT BEDTIME   • ZOLMitriptan (Zomig) 5 MG tablet Take 1 tablet at onset of headache. Max 2 tablets per week.       Objective     /80   Pulse 78   Temp (!) 97.4 °F (36.3 °C) (Temporal)   Ht 4' 10\" (1.473 m)   Wt 97.8 kg (215 lb 9.6 oz)   SpO2 98%   BMI 45.06 kg/m²     Physical Exam  Vitals and nursing note " reviewed.   Constitutional:       General: She is not in acute distress.     Appearance: Normal appearance. She is well-developed. She is obese. She is not diaphoretic.   HENT:      Head: Normocephalic and atraumatic.      Right Ear: External ear normal.      Left Ear: External ear normal.   Eyes:      General: Lids are normal.         Right eye: No discharge.         Left eye: No discharge.      Conjunctiva/sclera: Conjunctivae normal.   Pulmonary:      Effort: Pulmonary effort is normal. No respiratory distress.   Musculoskeletal:         General: No deformity.   Skin:     General: Skin is warm and dry.   Neurological:      General: No focal deficit present.      Mental Status: She is alert and oriented to person, place, and time.   Psychiatric:         Speech: Speech normal.         Behavior: Behavior normal.         Thought Content: Thought content normal.         Judgment: Judgment normal.       MARGUERITE Patel

## 2024-06-16 DIAGNOSIS — J30.2 SEASONAL ALLERGIES: ICD-10-CM

## 2024-06-18 RX ORDER — FLUTICASONE PROPIONATE 50 MCG
SPRAY, SUSPENSION (ML) NASAL
Qty: 48 ML | Refills: 1 | Status: SHIPPED | OUTPATIENT
Start: 2024-06-18

## 2024-06-25 ENCOUNTER — APPOINTMENT (OUTPATIENT)
Dept: LAB | Facility: CLINIC | Age: 49
End: 2024-06-25
Payer: COMMERCIAL

## 2024-06-25 DIAGNOSIS — E78.2 MIXED HYPERLIPIDEMIA: ICD-10-CM

## 2024-06-25 DIAGNOSIS — E11.9 TYPE 2 DIABETES MELLITUS WITHOUT COMPLICATION, WITHOUT LONG-TERM CURRENT USE OF INSULIN (HCC): ICD-10-CM

## 2024-06-25 LAB
ALBUMIN SERPL BCG-MCNC: 3.9 G/DL (ref 3.5–5)
ALP SERPL-CCNC: 92 U/L (ref 34–104)
ALT SERPL W P-5'-P-CCNC: 10 U/L (ref 7–52)
ANION GAP SERPL CALCULATED.3IONS-SCNC: 9 MMOL/L (ref 4–13)
AST SERPL W P-5'-P-CCNC: 10 U/L (ref 13–39)
BILIRUB SERPL-MCNC: 0.32 MG/DL (ref 0.2–1)
BUN SERPL-MCNC: 8 MG/DL (ref 5–25)
CALCIUM SERPL-MCNC: 9.3 MG/DL (ref 8.4–10.2)
CHLORIDE SERPL-SCNC: 100 MMOL/L (ref 96–108)
CHOLEST SERPL-MCNC: 192 MG/DL
CO2 SERPL-SCNC: 27 MMOL/L (ref 21–32)
CREAT SERPL-MCNC: 0.54 MG/DL (ref 0.6–1.3)
EST. AVERAGE GLUCOSE BLD GHB EST-MCNC: 180 MG/DL
GFR SERPL CREATININE-BSD FRML MDRD: 112 ML/MIN/1.73SQ M
GLUCOSE P FAST SERPL-MCNC: 152 MG/DL (ref 65–99)
HBA1C MFR BLD: 7.9 %
HDLC SERPL-MCNC: 37 MG/DL
LDLC SERPL CALC-MCNC: 109 MG/DL (ref 0–100)
NONHDLC SERPL-MCNC: 155 MG/DL
POTASSIUM SERPL-SCNC: 4.6 MMOL/L (ref 3.5–5.3)
PROT SERPL-MCNC: 7.1 G/DL (ref 6.4–8.4)
SODIUM SERPL-SCNC: 136 MMOL/L (ref 135–147)
TRIGL SERPL-MCNC: 229 MG/DL

## 2024-06-25 PROCEDURE — 80061 LIPID PANEL: CPT

## 2024-06-25 PROCEDURE — 36415 COLL VENOUS BLD VENIPUNCTURE: CPT

## 2024-06-25 PROCEDURE — 83036 HEMOGLOBIN GLYCOSYLATED A1C: CPT

## 2024-06-25 PROCEDURE — 80053 COMPREHEN METABOLIC PANEL: CPT

## 2024-06-27 ENCOUNTER — OFFICE VISIT (OUTPATIENT)
Dept: FAMILY MEDICINE CLINIC | Facility: CLINIC | Age: 49
End: 2024-06-27
Payer: COMMERCIAL

## 2024-06-27 VITALS
DIASTOLIC BLOOD PRESSURE: 78 MMHG | SYSTOLIC BLOOD PRESSURE: 136 MMHG | HEART RATE: 94 BPM | TEMPERATURE: 97.2 F | WEIGHT: 215.4 LBS | OXYGEN SATURATION: 96 % | BODY MASS INDEX: 45.21 KG/M2 | HEIGHT: 58 IN

## 2024-06-27 DIAGNOSIS — E11.9 TYPE 2 DIABETES MELLITUS WITHOUT COMPLICATION, WITHOUT LONG-TERM CURRENT USE OF INSULIN (HCC): Primary | ICD-10-CM

## 2024-06-27 DIAGNOSIS — Z12.31 ENCOUNTER FOR SCREENING MAMMOGRAM FOR MALIGNANT NEOPLASM OF BREAST: ICD-10-CM

## 2024-06-27 DIAGNOSIS — E78.5 HYPERLIPIDEMIA, UNSPECIFIED HYPERLIPIDEMIA TYPE: Chronic | ICD-10-CM

## 2024-06-27 PROCEDURE — 99214 OFFICE O/P EST MOD 30 MIN: CPT | Performed by: NURSE PRACTITIONER

## 2024-06-27 RX ORDER — BLOOD SUGAR DIAGNOSTIC
STRIP MISCELLANEOUS
Qty: 100 EACH | Refills: 3 | Status: SHIPPED | OUTPATIENT
Start: 2024-06-27

## 2024-06-27 RX ORDER — LANCETS 33 GAUGE
EACH MISCELLANEOUS
Qty: 100 EACH | Refills: 3 | Status: SHIPPED | OUTPATIENT
Start: 2024-06-27

## 2024-06-27 RX ORDER — BLOOD-GLUCOSE METER
KIT MISCELLANEOUS
Qty: 1 KIT | Refills: 0 | Status: SHIPPED | OUTPATIENT
Start: 2024-06-27

## 2024-06-27 NOTE — PROGRESS NOTES
Ambulatory Visit  Name: Iris Curtis      : 1975      MRN: [id number]  Encounter Provider: MARGUERITE Patel  Encounter Date: 2024   Encounter department: [facility]    Assessment & Plan   1. Type 2 diabetes mellitus without complication, without long-term current use of insulin (HCC)  Assessment & Plan:  Currently not controlled on monotherapy metformin 1000 mg daily  Lab Results   Component Value Date    HGBA1C 7.9 (H) 2024   Out of last 3 months has gone 6 weeks without medication.  Will continue with same at this time and encouraged adherence.  Discussed she could look into cash prices for 90-day supplies if that helps.  Glucometer and testing supplies ordered for patient to monitor blood sugar at home periodically.  Advised patient that if sugars not improving or worsening over the next few weeks she is to let me know we will make adjustments prior to next A1c.  Patient verbalized understanding and is in agreement with treatment plan.  Recheck in office in 3 months with A1c done at that time  Orders:  -     Blood Glucose Monitoring Suppl (OneTouch Verio Reflect) w/Device KIT; Check blood sugars once daily. Please substitute with appropriate alternative as covered by patient's insurance. Dx: E11.65  -     glucose blood (OneTouch Verio) test strip; Check blood sugars once daily. Please substitute with appropriate alternative as covered by patient's insurance. Dx: E11.65  -     OneTouch Delica Lancets 33G MISC; Check blood sugars once daily. Please substitute with appropriate alternative as covered by patient's insurance. Dx: E11.65  2. Encounter for screening mammogram for malignant neoplasm of breast  -     Mammo screening bilateral w 3d & cad; Future  3. Hyperlipidemia, unspecified hyperlipidemia type  Assessment & Plan:  Labs reviewed with patient.  LDL not at goal.  Labs reflect 6-week period without medication.  Will continue with the same recheck labs  "in about 4 to 6 months.     History of Present Illness   {Disappearing Hyperlinks I Encounters * My Last Note * Since Last Visit * History :87942}  Patient presents to the office today for follow-up regarding diabetes.  The past few months metformin was increased to 1000 mg daily extended release tablet.  Crestor was also increased to 10 mg daily.  BuSpar was increased and at follow-up visit May 14 she noted improvement.  Labs were done to be reviewed.  These labs were done June 25.  A1c was 7.9 this increased from 7.5. states out of the last 3 months was off medication for 6 weeks due to insurance.   Cholesterol numbers did not improve LDL was 105 now 109. Also off for 6 weeks due to insurance/pharmacy.   Metabolic panel was stable.  Continues to have issues with pharmacy despite changing pharmacy and us confirming 90 day supplies being sent with refills.   Has not monitored blood sugar at home. Doesn't have supplies.         Review of Systems   Constitutional:  Negative for chills and fever.   Eyes:  Negative for discharge.   Respiratory:  Negative for shortness of breath.    Cardiovascular:  Negative for chest pain.   Gastrointestinal:  Negative for constipation and diarrhea.   Genitourinary:  Negative for difficulty urinating.   Musculoskeletal:  Negative for joint swelling.   Skin:  Negative for rash.   Neurological:  Negative for headaches.   Hematological:  Negative for adenopathy.   Psychiatric/Behavioral:  The patient is not nervous/anxious.        Objective   {Disappearing Hyperlinks   Review Vitals * Enter New Vitals * Results Review * Labs * Imaging * Cardiology * Procedures * Lung Cancer Screening :[id number]}  /78   Pulse 94   Temp (!) 97.2 °F (36.2 °C) (Temporal)   Ht 4' 10\" (1.473 m)   Wt 97.7 kg (215 lb 6.4 oz)   SpO2 96%   BMI 45.02 kg/m²     Physical Exam  Vitals and nursing note reviewed.   Constitutional:       General: She is not in acute distress.     Appearance: Normal appearance. She " is well-developed. She is obese. She is not diaphoretic.   HENT:      Head: Normocephalic and atraumatic.      Right Ear: External ear normal.      Left Ear: External ear normal.   Eyes:      General: Lids are normal.         Right eye: No discharge.         Left eye: No discharge.      Conjunctiva/sclera: Conjunctivae normal.   Cardiovascular:      Rate and Rhythm: Normal rate and regular rhythm.      Heart sounds: No murmur heard.  Pulmonary:      Effort: Pulmonary effort is normal. No respiratory distress.      Breath sounds: Rhonchi present. No wheezing.   Musculoskeletal:         General: No deformity.   Skin:     General: Skin is warm and dry.   Neurological:      General: No focal deficit present.      Mental Status: She is alert and oriented to person, place, and time.   Psychiatric:         Speech: Speech normal.         Behavior: Behavior normal.         Thought Content: Thought content normal.         Judgment: Judgment normal.       Administrative Statements {Disappearing Hyperlinks I  Level of Service * [facility] [place]:[id number]}

## 2024-06-27 NOTE — ASSESSMENT & PLAN NOTE
Labs reviewed with patient.  LDL not at goal.  Labs reflect 6-week period without medication.  Will continue with the same recheck labs in about 4 to 6 months.

## 2024-06-27 NOTE — ASSESSMENT & PLAN NOTE
Currently not controlled on monotherapy metformin 1000 mg daily  Lab Results   Component Value Date    HGBA1C 7.9 (H) 06/25/2024   Out of last 3 months has gone 6 weeks without medication.  Will continue with same at this time and encouraged adherence.  Discussed she could look into cash prices for 90-day supplies if that helps.  Glucometer and testing supplies ordered for patient to monitor blood sugar at home periodically.  Advised patient that if sugars not improving or worsening over the next few weeks she is to let me know we will make adjustments prior to next A1c.  Patient verbalized understanding and is in agreement with treatment plan.  Recheck in office in 3 months with A1c done at that time

## 2024-07-18 DIAGNOSIS — F41.9 ANXIETY: ICD-10-CM

## 2024-07-18 RX ORDER — BUSPIRONE HYDROCHLORIDE 5 MG/1
5 TABLET ORAL 2 TIMES DAILY
Qty: 200 TABLET | Refills: 0 | Status: SHIPPED | OUTPATIENT
Start: 2024-07-18

## 2024-07-29 ENCOUNTER — HOSPITAL ENCOUNTER (OUTPATIENT)
Dept: MAMMOGRAPHY | Facility: HOSPITAL | Age: 49
Discharge: HOME | End: 2024-07-29
Payer: COMMERCIAL

## 2024-07-29 VITALS — BODY MASS INDEX: 43.34 KG/M2 | HEIGHT: 59 IN | WEIGHT: 215 LBS

## 2024-07-29 DIAGNOSIS — Z12.31 ENCOUNTER FOR SCREENING MAMMOGRAM FOR MALIGNANT NEOPLASM OF BREAST: ICD-10-CM

## 2024-07-29 PROCEDURE — 77067 SCR MAMMO BI INCL CAD: CPT

## 2024-07-29 PROCEDURE — 77063 BREAST TOMOSYNTHESIS BI: CPT

## 2024-09-14 DIAGNOSIS — E66.01 MORBID OBESITY (HCC): ICD-10-CM

## 2024-09-14 DIAGNOSIS — E11.9 TYPE 2 DIABETES MELLITUS WITHOUT COMPLICATION, WITHOUT LONG-TERM CURRENT USE OF INSULIN (HCC): ICD-10-CM

## 2024-09-14 DIAGNOSIS — E78.5 HYPERLIPIDEMIA, UNSPECIFIED HYPERLIPIDEMIA TYPE: ICD-10-CM

## 2024-09-14 RX ORDER — METFORMIN HCL 500 MG
TABLET, EXTENDED RELEASE 24 HR ORAL
Qty: 180 TABLET | Refills: 1 | Status: SHIPPED | OUTPATIENT
Start: 2024-09-14

## 2024-09-14 RX ORDER — ROSUVASTATIN CALCIUM 10 MG/1
10 TABLET, COATED ORAL DAILY
Qty: 90 TABLET | Refills: 1 | Status: SHIPPED | OUTPATIENT
Start: 2024-09-14

## 2024-09-14 RX ORDER — LOSARTAN POTASSIUM 25 MG/1
25 TABLET ORAL DAILY
Qty: 90 TABLET | Refills: 1 | Status: SHIPPED | OUTPATIENT
Start: 2024-09-14

## 2024-09-18 DIAGNOSIS — E55.9 VITAMIN D DEFICIENCY: ICD-10-CM

## 2024-09-18 RX ORDER — CHOLECALCIFEROL (VITAMIN D3) 25 MCG
1 TABLET ORAL DAILY
Qty: 90 TABLET | Refills: 1 | Status: SHIPPED | OUTPATIENT
Start: 2024-09-18

## 2024-10-03 ENCOUNTER — OFFICE VISIT (OUTPATIENT)
Dept: FAMILY MEDICINE CLINIC | Facility: CLINIC | Age: 49
End: 2024-10-03
Payer: COMMERCIAL

## 2024-10-03 VITALS
TEMPERATURE: 98.4 F | DIASTOLIC BLOOD PRESSURE: 86 MMHG | HEIGHT: 59 IN | OXYGEN SATURATION: 97 % | WEIGHT: 220 LBS | HEART RATE: 92 BPM | BODY MASS INDEX: 44.35 KG/M2 | SYSTOLIC BLOOD PRESSURE: 134 MMHG

## 2024-10-03 DIAGNOSIS — E78.5 HYPERLIPIDEMIA, UNSPECIFIED HYPERLIPIDEMIA TYPE: Chronic | ICD-10-CM

## 2024-10-03 DIAGNOSIS — G89.29 CHRONIC PAIN OF RIGHT KNEE: ICD-10-CM

## 2024-10-03 DIAGNOSIS — M25.561 CHRONIC PAIN OF RIGHT KNEE: ICD-10-CM

## 2024-10-03 DIAGNOSIS — Z23 ENCOUNTER FOR IMMUNIZATION: ICD-10-CM

## 2024-10-03 DIAGNOSIS — K21.9 GASTROESOPHAGEAL REFLUX DISEASE, UNSPECIFIED WHETHER ESOPHAGITIS PRESENT: ICD-10-CM

## 2024-10-03 DIAGNOSIS — F41.9 ANXIETY: ICD-10-CM

## 2024-10-03 DIAGNOSIS — J45.40 MODERATE PERSISTENT ASTHMA WITHOUT COMPLICATION: Chronic | ICD-10-CM

## 2024-10-03 DIAGNOSIS — M25.562 CHRONIC PAIN OF LEFT KNEE: ICD-10-CM

## 2024-10-03 DIAGNOSIS — E66.01 MORBID OBESITY (HCC): Chronic | ICD-10-CM

## 2024-10-03 DIAGNOSIS — G43.909 MIGRAINE WITHOUT STATUS MIGRAINOSUS, NOT INTRACTABLE, UNSPECIFIED MIGRAINE TYPE: Chronic | ICD-10-CM

## 2024-10-03 DIAGNOSIS — Z00.00 HEALTH MAINTENANCE EXAMINATION: Primary | ICD-10-CM

## 2024-10-03 DIAGNOSIS — G89.29 CHRONIC PAIN OF LEFT KNEE: ICD-10-CM

## 2024-10-03 DIAGNOSIS — Z23 NEED FOR COVID-19 VACCINE: ICD-10-CM

## 2024-10-03 DIAGNOSIS — E11.9 TYPE 2 DIABETES MELLITUS WITHOUT COMPLICATION, WITHOUT LONG-TERM CURRENT USE OF INSULIN (HCC): ICD-10-CM

## 2024-10-03 LAB
CREAT UR-MCNC: 55.9 MG/DL
MICROALBUMIN UR-MCNC: <7 MG/L
SL AMB POCT HEMOGLOBIN AIC: 8.5 (ref ?–6.5)

## 2024-10-03 PROCEDURE — 90471 IMMUNIZATION ADMIN: CPT

## 2024-10-03 PROCEDURE — 91320 SARSCV2 VAC 30MCG TRS-SUC IM: CPT

## 2024-10-03 PROCEDURE — 99214 OFFICE O/P EST MOD 30 MIN: CPT | Performed by: NURSE PRACTITIONER

## 2024-10-03 PROCEDURE — 99396 PREV VISIT EST AGE 40-64: CPT | Performed by: NURSE PRACTITIONER

## 2024-10-03 PROCEDURE — 83036 HEMOGLOBIN GLYCOSYLATED A1C: CPT | Performed by: NURSE PRACTITIONER

## 2024-10-03 PROCEDURE — 90673 RIV3 VACCINE NO PRESERV IM: CPT

## 2024-10-03 PROCEDURE — 82570 ASSAY OF URINE CREATININE: CPT | Performed by: NURSE PRACTITIONER

## 2024-10-03 PROCEDURE — 82043 UR ALBUMIN QUANTITATIVE: CPT | Performed by: NURSE PRACTITIONER

## 2024-10-03 PROCEDURE — 90480 ADMN SARSCOV2 VAC 1/ONLY CMP: CPT

## 2024-10-03 RX ORDER — ALBUTEROL SULFATE 90 UG/1
2 INHALANT RESPIRATORY (INHALATION) EVERY 6 HOURS PRN
Qty: 8.5 G | Refills: 1 | Status: SHIPPED | OUTPATIENT
Start: 2024-10-03

## 2024-10-03 RX ORDER — ALBUTEROL SULFATE 0.83 MG/ML
2.5 SOLUTION RESPIRATORY (INHALATION) EVERY 4 HOURS PRN
Qty: 75 ML | Refills: 1 | Status: SHIPPED | OUTPATIENT
Start: 2024-10-03

## 2024-10-03 NOTE — ASSESSMENT & PLAN NOTE
LDL not at goal. On statin. Recheck 3 months.   Lab Results   Component Value Date    LDLCALC 109 (H) 06/25/2024

## 2024-10-03 NOTE — ASSESSMENT & PLAN NOTE
A1c worsening/ not at goal. Start Zepbound 2.5 mg weekly.   Lab Results   Component Value Date    HGBA1C 8.5 (A) 10/03/2024       Orders:    Albumin / creatinine urine ratio; Future    POCT hemoglobin A1c    tirzepatide (Mounjaro) 2.5 MG/0.5ML; Inject 0.5 mL (2.5 mg total) under the skin every 7 days    Albumin / creatinine urine ratio

## 2024-10-03 NOTE — PATIENT INSTRUCTIONS
Complete x rays of both knees.     Follow up with ortho.     Start Zepbound 2.5 mg weekly.     Recheck 3 months.     Please call the office if you are experiencing any worsening of symptoms or no symptom improvement.

## 2024-10-03 NOTE — PROGRESS NOTES
Adult Annual Physical  Name: Iris Curtis      : 1975      MRN: [id number]  Encounter Provider: MARGUERITE Patel  Encounter Date: 10/3/2024   Encounter department: [facility]    Assessment & Plan  Health maintenance examination         Type 2 diabetes mellitus without complication, without long-term current use of insulin (HCC)  A1c worsening/ not at goal. Start Zepbound 2.5 mg weekly.   Lab Results   Component Value Date    HGBA1C 8.5 (A) 10/03/2024       Orders:    Albumin / creatinine urine ratio; Future    POCT hemoglobin A1c    tirzepatide (Mounjaro) 2.5 MG/0.5ML; Inject 0.5 mL (2.5 mg total) under the skin every 7 days    Albumin / creatinine urine ratio    Chronic pain of right knee    Orders:    XR knee 3 vw right non injury; Future    Ambulatory Referral to Orthopedic Surgery; Future    Chronic pain of left knee    Orders:    XR knee 3 vw left non injury; Future    Ambulatory Referral to Orthopedic Surgery; Future    Hyperlipidemia, unspecified hyperlipidemia type  LDL not at goal. On statin. Recheck 3 months.   Lab Results   Component Value Date    LDLCALC 109 (H) 2024              Morbid obesity (HCC)  Encouraged to find exercise that she can tolerate well.        Moderate persistent asthma without complication  Restart daily advair. Continue with albuterol PRN. Please call the office if you are experiencing any worsening of symptoms or no symptom improvement.       Orders:    albuterol (PROVENTIL HFA,VENTOLIN HFA) 90 mcg/act inhaler; Inhale 2 puffs every 6 (six) hours as needed for wheezing    albuterol (2.5 mg/3 mL) 0.083 % nebulizer solution; Take 3 mL (2.5 mg total) by nebulization every 4 (four) hours as needed for wheezing    Gastroesophageal reflux disease, unspecified whether esophagitis present         Migraine without status migrainosus, not intractable, unspecified migraine type  Stable on topamax 75 mg at bedtime elavil and uses zomig  PRN         Need for COVID-19 vaccine    Orders:    COVID-19 Pfizer mRNA vaccine 12 yr and older (Comirnaty pre-filled syringe)    Encounter for immunization    Orders:    influenza vaccine, recombinant, PF, 0.5 mL IM (Flublok)    Anxiety  Continue with current medications           Immunizations and preventive care screenings were discussed with patient today. Appropriate education was printed on patient's after visit summary.    Counseling:  Dental Health: discussed importance of regular tooth brushing, flossing, and dental visits.  Exercise: the importance of regular exercise/physical activity was discussed. Recommend exercise 3-5 times per week for at least 30 minutes.          History of Present Illness     Adult Annual Physical:  Patient presents for annual physical. Patient presents to the office today for wellness exam and diabetic follow-up.  Patient last seen in June and at that time A1c was 7.9.  But this is reflective of at least 6 weeks without medication.  No adjustments were made just encouraged adherence at that time.  Glucometer was ordered for her to monitor blood sugar at home.  States high stress and eating more. Good adherence to meds. Exercise limited due to chronic knee pain..     Diet and Physical Activity:  - Diet/Nutrition: poor diet. stress eating  - Exercise: no formal exercise. b/l chronic knee pain limiting    General Health:    - Vision: most recent eye exam < 1 year ago. due  - Dental: no dental visits for > 1 year.    Review of Systems   Constitutional:  Negative for chills and fever.   Eyes:  Negative for discharge.   Respiratory:  Negative for shortness of breath.    Cardiovascular:  Negative for chest pain.   Gastrointestinal:  Negative for constipation and diarrhea.   Genitourinary:  Negative for difficulty urinating.   Musculoskeletal:  Positive for arthralgias. Negative for joint swelling.   Skin:  Negative for rash.   Neurological:  Negative for headaches.   Hematological:   "Negative for adenopathy.   Psychiatric/Behavioral:  The patient is not nervous/anxious.          Objective     /86 (BP Location: Left arm, Patient Position: Sitting, Cuff Size: Large)   Pulse 92   Temp 98.4 °F (36.9 °C) (Temporal)   Ht 4' 11\" (1.499 m)   Wt 99.8 kg (220 lb)   SpO2 97%   BMI 44.43 kg/m²     Physical Exam  Vitals and nursing note reviewed.   Constitutional:       General: She is not in acute distress.     Appearance: Normal appearance. She is obese. She is not ill-appearing, toxic-appearing or diaphoretic.   HENT:      Head: Normocephalic and atraumatic.      Right Ear: Tympanic membrane, ear canal and external ear normal. There is no impacted cerumen.      Left Ear: Tympanic membrane, ear canal and external ear normal. There is no impacted cerumen.      Nose: Nose normal.      Mouth/Throat:      Mouth: Mucous membranes are moist.      Pharynx: Oropharynx is clear. No oropharyngeal exudate or posterior oropharyngeal erythema.   Eyes:      General: Lids are normal. No scleral icterus.        Right eye: No discharge.         Left eye: No discharge.      Extraocular Movements: Extraocular movements intact.      Conjunctiva/sclera: Conjunctivae normal.      Pupils: Pupils are equal, round, and reactive to light.   Cardiovascular:      Rate and Rhythm: Normal rate and regular rhythm. No extrasystoles are present.     Heart sounds: S1 normal and S2 normal. No murmur heard.  Pulmonary:      Effort: Pulmonary effort is normal. No respiratory distress.      Breath sounds: Normal breath sounds. No stridor or decreased air movement. No wheezing or rhonchi.   Abdominal:      General: Bowel sounds are normal.      Palpations: Abdomen is soft.      Tenderness: There is no abdominal tenderness.   Musculoskeletal:         General: Normal range of motion.      Cervical back: Normal range of motion and neck supple.   Skin:     General: Skin is warm and dry.   Neurological:      General: No focal deficit " present.      Mental Status: She is alert and oriented to person, place, and time. Mental status is at baseline.      Cranial Nerves: No cranial nerve deficit.      Sensory: No sensory deficit.      Motor: No weakness.      Coordination: Coordination normal.      Gait: Gait normal.   Psychiatric:         Attention and Perception: Attention and perception normal.         Mood and Affect: Mood and affect normal.         Speech: Speech normal.         Behavior: Behavior is cooperative.         Cognition and Memory: Cognition normal.         Judgment: Judgment normal.

## 2024-10-03 NOTE — ASSESSMENT & PLAN NOTE
Restart daily advair. Continue with albuterol PRN. Please call the office if you are experiencing any worsening of symptoms or no symptom improvement.       Orders:    albuterol (PROVENTIL HFA,VENTOLIN HFA) 90 mcg/act inhaler; Inhale 2 puffs every 6 (six) hours as needed for wheezing    albuterol (2.5 mg/3 mL) 0.083 % nebulizer solution; Take 3 mL (2.5 mg total) by nebulization every 4 (four) hours as needed for wheezing

## 2024-10-05 PROBLEM — R07.9 CHEST PAIN: Status: RESOLVED | Noted: 2024-02-21 | Resolved: 2024-10-05

## 2024-10-09 DIAGNOSIS — F41.9 ANXIETY: ICD-10-CM

## 2024-10-10 RX ORDER — BUSPIRONE HYDROCHLORIDE 5 MG/1
5 TABLET ORAL 2 TIMES DAILY
Qty: 180 TABLET | Refills: 1 | Status: SHIPPED | OUTPATIENT
Start: 2024-10-10

## 2024-10-12 ENCOUNTER — APPOINTMENT (OUTPATIENT)
Dept: RADIOLOGY | Age: 49
End: 2024-10-12
Payer: COMMERCIAL

## 2024-10-12 DIAGNOSIS — M25.561 CHRONIC PAIN OF RIGHT KNEE: ICD-10-CM

## 2024-10-12 DIAGNOSIS — M25.562 CHRONIC PAIN OF LEFT KNEE: ICD-10-CM

## 2024-10-12 DIAGNOSIS — G89.29 CHRONIC PAIN OF LEFT KNEE: ICD-10-CM

## 2024-10-12 DIAGNOSIS — G89.29 CHRONIC PAIN OF RIGHT KNEE: ICD-10-CM

## 2024-10-12 PROCEDURE — 73562 X-RAY EXAM OF KNEE 3: CPT

## 2024-10-13 DIAGNOSIS — I10 ACCELERATED HYPERTENSION: ICD-10-CM

## 2024-10-13 RX ORDER — METOPROLOL SUCCINATE 50 MG/1
50 TABLET, EXTENDED RELEASE ORAL DAILY
Qty: 90 TABLET | Refills: 1 | Status: SHIPPED | OUTPATIENT
Start: 2024-10-13

## 2024-10-14 ENCOUNTER — OFFICE VISIT (OUTPATIENT)
Dept: OBGYN CLINIC | Facility: CLINIC | Age: 49
End: 2024-10-14
Payer: COMMERCIAL

## 2024-10-14 VITALS
SYSTOLIC BLOOD PRESSURE: 124 MMHG | DIASTOLIC BLOOD PRESSURE: 83 MMHG | WEIGHT: 220 LBS | HEIGHT: 59 IN | HEART RATE: 90 BPM | BODY MASS INDEX: 44.35 KG/M2

## 2024-10-14 DIAGNOSIS — E66.01 CLASS 3 SEVERE OBESITY WITH BODY MASS INDEX (BMI) OF 40.0 TO 44.9 IN ADULT, UNSPECIFIED OBESITY TYPE, UNSPECIFIED WHETHER SERIOUS COMORBIDITY PRESENT (HCC): ICD-10-CM

## 2024-10-14 DIAGNOSIS — M25.562 CHRONIC PAIN OF LEFT KNEE: ICD-10-CM

## 2024-10-14 DIAGNOSIS — G89.29 CHRONIC PAIN OF LEFT KNEE: ICD-10-CM

## 2024-10-14 DIAGNOSIS — M25.562 PAIN IN BOTH KNEES, UNSPECIFIED CHRONICITY: ICD-10-CM

## 2024-10-14 DIAGNOSIS — G89.29 CHRONIC PAIN OF RIGHT KNEE: ICD-10-CM

## 2024-10-14 DIAGNOSIS — M25.561 PAIN IN BOTH KNEES, UNSPECIFIED CHRONICITY: ICD-10-CM

## 2024-10-14 DIAGNOSIS — E11.9 TYPE 2 DIABETES MELLITUS WITHOUT COMPLICATION, WITHOUT LONG-TERM CURRENT USE OF INSULIN (HCC): ICD-10-CM

## 2024-10-14 DIAGNOSIS — M25.561 CHRONIC PAIN OF RIGHT KNEE: ICD-10-CM

## 2024-10-14 DIAGNOSIS — M17.0 PRIMARY OSTEOARTHRITIS OF BOTH KNEES: Primary | ICD-10-CM

## 2024-10-14 DIAGNOSIS — E66.813 CLASS 3 SEVERE OBESITY WITH BODY MASS INDEX (BMI) OF 40.0 TO 44.9 IN ADULT, UNSPECIFIED OBESITY TYPE, UNSPECIFIED WHETHER SERIOUS COMORBIDITY PRESENT (HCC): ICD-10-CM

## 2024-10-14 PROCEDURE — 99204 OFFICE O/P NEW MOD 45 MIN: CPT | Performed by: STUDENT IN AN ORGANIZED HEALTH CARE EDUCATION/TRAINING PROGRAM

## 2024-10-14 RX ORDER — MELOXICAM 15 MG/1
15 TABLET ORAL DAILY
Qty: 30 TABLET | Refills: 0 | Status: SHIPPED | OUTPATIENT
Start: 2024-10-14

## 2024-10-14 NOTE — PROGRESS NOTES
49 y.o.female presents for evaluation of bilat knee pain, right greater than left.  Right in chronic while the left is more the paste 2-3 months and associated more with locking and clicking.The right pain is more circumferential over the anterior aspect.  She wants to do more things with her , but can't secondary to pain.  She notes difficulty extending the left knee completely, and limited flexion of the right knee.  She feels grinding in the left knee.     Review of Systems  Review of systems negative unless otherwise specified in HPI    Past Medical History  Past Medical History:   Diagnosis Date    Allergic     Asthma     Diabetes mellitus (HCC)     Hypertension     Migraines      Past Surgical History  Past Surgical History:   Procedure Laterality Date    HERNIA REPAIR       Current Medications  Current Outpatient Medications on File Prior to Visit   Medication Sig Dispense Refill    albuterol (2.5 mg/3 mL) 0.083 % nebulizer solution Take 3 mL (2.5 mg total) by nebulization every 4 (four) hours as needed for wheezing 75 mL 1    albuterol (PROVENTIL HFA,VENTOLIN HFA) 90 mcg/act inhaler Inhale 2 puffs every 6 (six) hours as needed for wheezing 8.5 g 1    Alcaftadine (Lastacaft) 0.25 % SOLN Administer 1 drop to both eyes daily as needed (allergic eye symptoms) 3 mL 1    amitriptyline (ELAVIL) 10 mg tablet Take 2 tablets (20 mg total) by mouth daily at bedtime 180 tablet 1    azelastine (OPTIVAR) 0.05 % ophthalmic solution INSTILL 1 DROP INTO BOTH EYES TWICE A DAY 18 mL 1    Blood Glucose Monitoring Suppl (OneTouch Verio Reflect) w/Device KIT Check blood sugars once daily. Please substitute with appropriate alternative as covered by patient's insurance. Dx: E11.65 1 kit 0    busPIRone (BUSPAR) 5 mg tablet TAKE 1 TABLET BY MOUTH TWICE A  tablet 1    cetirizine (ZyrTEC) 10 mg tablet Take 1 tablet (10 mg total) by mouth daily 90 tablet 1    Cholecalciferol (Vitamin D3) 25 MCG TABS TAKE 1 TABLET BY MOUTH  EVERY DAY 90 tablet 1    fluticasone (FLONASE) 50 mcg/act nasal spray SPRAY 2 SPRAYS INTO EACH NOSTRIL EVERY DAY 48 mL 1    Fluticasone-Salmeterol (Wixela Inhub) 250-50 mcg/dose inhaler Inhale 1 puff 2 (two) times a day Rinse mouth after use. 60 blister 5    glucose blood (OneTouch Verio) test strip Check blood sugars once daily. Please substitute with appropriate alternative as covered by patient's insurance. Dx: E11.65 100 each 3    losartan (COZAAR) 25 mg tablet TAKE 1 TABLET (25 MG TOTAL) BY MOUTH DAILY. 90 tablet 1    metFORMIN (GLUCOPHAGE-XR) 500 mg 24 hr tablet TAKE 2 TABLETS BY MOUTH DAILY WITH DINNER 180 tablet 1    metoprolol succinate (TOPROL-XL) 50 mg 24 hr tablet TAKE 1 TABLET BY MOUTH EVERY DAY 90 tablet 1    montelukast (SINGULAIR) 10 mg tablet Take 1 tablet (10 mg total) by mouth daily at bedtime 90 tablet 1    nystatin (MYCOSTATIN) powder Apply topically 3 (three) times a day 45 g 1    omeprazole (PriLOSEC) 20 mg delayed release capsule Take 1 capsule (20 mg total) by mouth daily 90 capsule 1    OneTouch Delica Lancets 33G MISC Check blood sugars once daily. Please substitute with appropriate alternative as covered by patient's insurance. Dx: E11.65 100 each 3    rosuvastatin (CRESTOR) 10 MG tablet TAKE 1 TABLET BY MOUTH EVERY DAY 90 tablet 1    ZOLMitriptan (Zomig) 5 MG tablet Take 1 tablet at onset of headache. Max 2 tablets per week. 5 tablet 1    tirzepatide (Mounjaro) 2.5 MG/0.5ML Inject 0.5 mL (2.5 mg total) under the skin every 7 days (Patient not taking: Reported on 10/14/2024) 2 mL 2    topiramate (TOPAMAX) 25 mg tablet TAKE 3 TABLETS (75 MG TOTAL) BY MOUTH DAILY AT BEDTIME 270 tablet 1     No current facility-administered medications on file prior to visit.     Recent Labs (HCT,HGB,PT,INR,ESR,CRP,GLU,HgA1C)  0   Lab Value Date/Time    HCT 39.9 02/21/2024 0052    HGB 12.5 02/21/2024 0052    WBC 13.37 (H) 02/21/2024 0052    HGBA1C 8.5 (A) 10/03/2024 0825    HGBA1C 7.9 (H) 06/25/2024 0840     HGBA1C 6.3 (H) 07/15/2020 0846     Physical exam  Body mass index is 44.43 kg/m².   General: Awake, Alert, Oriented  Eyes: Pupils equal, round and reactive to light  Heart: regular rate and rhythm  Lungs: No audible wheezing  Abdomen: soft  bilateral Knee exam  Minimal to trace effusion both knees no erythema no warmth.  She has patellofemoral tenderness of the left knee normalized anterior knee pain and pain in medial compartment of both knees.  No signs of infection.  He is able to get full extension actively flexion to approximately 90 degrees.  The left knee actively gets to approximately negative for with flexion to 110 degrees.  No gross ligamentous laxity.  Patellas track midline.  Touch sensation is intact distally.  DP and PT pulses are intact distally.    Procedure  None today    Imaging  I personally viewed x-rays of both knees were taken 10/12/2024 and reviewed these with the patient at length.  These document tricompartmental arthritis with pronounced medial joint space narrowing of the left knee and bone-on-bone opposition the medial compartment of the right knee.    1. Primary osteoarthritis of both knees    2. Pain in both knees, unspecified chronicity    3. Chronic pain of right knee    4. Chronic pain of left knee    5. Class 3 severe obesity with body mass index (BMI) of 40.0 to 44.9 in adult, unspecified obesity type, unspecified whether serious comorbidity present (HCC)    6. Type 2 diabetes mellitus without complication, without long-term current use of insulin ([facility])      Assessment:  bilateral knee primary arthritis with right medial bone-on-bone and left medial near bone-on-bone and tricompartmental in nature both knees.  Elevated A1c.    Plan:  Patient's BMI would preclude any total joint surgery at current.  Also her elevated A1c would preclude any surgery for joint replacement at current.  Therefore patient has been referred to weight management.  Prescription for Mobic was sent to the  pharmacy.  She will be started in formal physical therapy.  She will follow-up in the orthopedic office in 4 weeks for repeat evaluation and decision on possible injection therapy.  No planned surgical intervention at current.  May use ice to the knees for pain or swelling.    This note was created with voice recognition software.

## 2024-10-14 NOTE — PATIENT INSTRUCTIONS
Patient's BMI would preclude any total joint surgery at current.  Also her elevated A1c would preclude any surgery for joint replacement at current.  Therefore patient has been referred to weight management.  Prescription for Mobic was sent to the pharmacy.  She will be started in formal physical therapy.  She will follow-up in the orthopedic office in 4 weeks for repeat evaluation and decision on possible injection therapy.  No planned surgical intervention at current.  May use ice to the knees for pain or swelling.

## 2024-10-18 NOTE — PROGRESS NOTES
PT Evaluation     Today's date: 10/22/2024  Patient name: Iris Curtis  : 1975  MRN: [id number]  Referring provider: Thomas Torres PA-C  Dx:   Encounter Diagnosis     ICD-10-CM    1. Primary osteoarthritis of both knees  M17.0 Ambulatory Referral to Physical Therapy      2. Bilateral chronic knee pain  M25.561     M25.562     G89.29           Start Time: 1023  Stop Time: 1100  Total time in clinic (min): 37 minutes    Assessment  Impairments: abnormal coordination, abnormal gait, abnormal or restricted ROM, activity intolerance, impaired balance, impaired physical strength, pain with function, poor body mechanics, participation limitations and activity limitations  Functional limitations: difficulty standing, walking and stairs  Symptom irritability: moderate    Assessment details: Iris Curtis is a 49 y.o. female presents to physical therapy with a history of chronic bilateral knee pain secondary to knee OA. Patient demonstrates limited and painful bilateral knee AROM, as well as global LE weakness making prolonged standing, walking, and stair climbing difficult. Significant past medical history of type 2 diabetes, hyperlipidemia, asthma, GERD, and anxiety. Pt demonstrates moderate complexity evaluation due to stated functional deficits, participation restrictions, and progression of symptoms. Due to multifactorial medical history, participation restrictions, and functional limitation (FOTO 41% function), the patient will benefit from skilled PT interventions to address aforementioned impairments to restore PLOF and attain desired goals.    Understanding of Dx/Px/POC: good     Prognosis: good  Prognosis details: Patient is pleasant and motivated to improve function. Demonstrates verbal understanding of POC and able to perform HEP.     Goals  STG (6 weeks)    STG: Pt to improve pain from 8/10 to 5/10 at its worst.    STG: Pt to improve bilateral knee ext MMT to at least 4/5 to help with  reciprocal gait pattern on stairs.     STG: Pt to improve 30 sec STS to at least 10 reps without UE support to improve LE strength and reduce risk for falls.    LTG (12 weeks)    LTG: Pt to be able to perform light activities around the house with little difficulty to improve independence with ADL's    LTG: Pt to be able to walk a mile with moderate difficulty to be able to return to walking routine with less discomfort.    LTG: Pt to be independent with HEP to continue to increase strength and mobility to improve overall function.    Plan  Patient would benefit from: skilled physical therapy  Planned modality interventions: cryotherapy, thermotherapy: hydrocollator packs, unattended electrical stimulation and neuromuscular electric stimulation    Planned therapy interventions: therapeutic activities, therapeutic exercise, gait training, manual therapy and neuromuscular re-education    Frequency: 2-3x week  Duration in weeks: 12  Plan of Care beginning date: 10/22/2024  Plan of Care expiration date: 2025  Treatment plan discussed with: patient  Plan details: Discussed POC with patient, patient demonstrates understanding.    Subjective Evaluation    History of Present Illness  Mechanism of injury: Iris Curtis is a 49 y.o. female presents to OPPT with complaints of progressive worsening bilateral knee pain R>L. Right knee has been bothering her for a few years while the L knee has started hurting more in the last 2-3 months and has been clicking, grinding and locking. L knee has difficulty fully extending, and R knee has more difficulty bending. Unable to walk more than a block without assistance due to knee pain and weakness.  Patient Goals  Patient goals for therapy: increased strength, independence with ADLs/IADLs, decreased pain, increased motion and improved balance  Patient goal: wants to be able to hike and go for walks  Pain  Current pain ratin  At best pain rating: 3  At worst pain rating:  8  Location: BL knees  Quality: sharp and dull ache    Social Support  Steps to enter house: yes  2  Stairs in house: yes   20      Diagnostic Tests  X-ray: abnormal    FCE comments: Bilateral knee OA    Objective     Active Range of Motion   Left Knee   Flexion: 80 degrees with pain  Extension: -5 degrees with pain    Right Knee   Flexion: 90 degrees with pain  Extension: 0 degrees with pain    Mobility   Patellar Mobility:   Left Knee   Hypomobile: left medial, left lateral, left superior and left inferior    Right Knee   Hypomobile: medial, lateral, superior and inferior     Strength/Myotome Testing     Left Hip   Planes of Motion   Flexion: 4-  Abduction: 4-  Adduction: 4-  External rotation: 4  Internal rotation: 4    Right Hip   Planes of Motion   Flexion: 4-  Abduction: 4-  Adduction: 4-  External rotation: 4  Internal rotation: 4    Left Knee   Flexion: 3+  Extension: 3+    Right Knee   Flexion: 3+  Extension: 3+    Left Ankle/Foot   Dorsiflexion: 4    Right Ankle/Foot   Dorsiflexion: 4    Tests     Additional Tests Details  30 sec STS: 6 reps    TU sec    SL balance R: 5 sec  SL balance L: 30 sec    Ambulation     Ambulation: Stairs   Ascend stairs: independent  Pattern: non-reciprocal  Railings: two rails  Descend stairs: independent  Pattern: non-reciprocal  Railings: two rails      Flowsheet Rows      Flowsheet Row Most Recent Value   PT/OT G-Codes    Current Score 41   Projected Score 57               Precautions: none noted  POC: 25  Re-Eval: 24  Access Code: [email or web address]     Manuals 10/22       Knee mobs        Knee PROM                        Neuro Re-Ed        LAQ w pause x10       Airex SL balance        bridge        TKE        Lunge stretch                        Ther Ex        Nu-step (cardio endurance)        Heel slides        Leg press        Standing hip abd/ext        Seated Hamstring stretch :10x5 B/L       Seated march w ankle weight        clamshell                Ther  Activity        Step ups/downs        Side stepping        STS x5                               Gait Training                        Modalities                          Reviewed patient scheduling and HEP.

## 2024-10-22 ENCOUNTER — EVALUATION (OUTPATIENT)
Dept: PHYSICAL THERAPY | Facility: CLINIC | Age: 49
End: 2024-10-22
Payer: COMMERCIAL

## 2024-10-22 DIAGNOSIS — G89.29 BILATERAL CHRONIC KNEE PAIN: ICD-10-CM

## 2024-10-22 DIAGNOSIS — M17.0 PRIMARY OSTEOARTHRITIS OF BOTH KNEES: Primary | ICD-10-CM

## 2024-10-22 DIAGNOSIS — M25.561 BILATERAL CHRONIC KNEE PAIN: ICD-10-CM

## 2024-10-22 DIAGNOSIS — M25.562 BILATERAL CHRONIC KNEE PAIN: ICD-10-CM

## 2024-10-22 PROCEDURE — 97162 PT EVAL MOD COMPLEX 30 MIN: CPT

## 2024-10-22 PROCEDURE — 97110 THERAPEUTIC EXERCISES: CPT

## 2024-10-24 ENCOUNTER — OFFICE VISIT (OUTPATIENT)
Dept: PHYSICAL THERAPY | Facility: CLINIC | Age: 49
End: 2024-10-24
Payer: COMMERCIAL

## 2024-10-24 DIAGNOSIS — M25.561 BILATERAL CHRONIC KNEE PAIN: ICD-10-CM

## 2024-10-24 DIAGNOSIS — G89.29 BILATERAL CHRONIC KNEE PAIN: ICD-10-CM

## 2024-10-24 DIAGNOSIS — M25.562 BILATERAL CHRONIC KNEE PAIN: ICD-10-CM

## 2024-10-24 DIAGNOSIS — M17.0 PRIMARY OSTEOARTHRITIS OF BOTH KNEES: Primary | ICD-10-CM

## 2024-10-24 PROCEDURE — 97110 THERAPEUTIC EXERCISES: CPT

## 2024-10-24 PROCEDURE — 97140 MANUAL THERAPY 1/> REGIONS: CPT

## 2024-10-24 PROCEDURE — 97112 NEUROMUSCULAR REEDUCATION: CPT

## 2024-10-24 NOTE — PROGRESS NOTES
"Daily Note     Today's date: 10/24/2024  Patient name: Iris Curtis  : 1975  MRN: [id number]  Referring provider: Thomas Torres PA-C  Dx:   Encounter Diagnosis     ICD-10-CM    1. Primary osteoarthritis of both knees  M17.0       2. Bilateral chronic knee pain  M25.561     M25.562     G89.29           Start Time: 0950  Stop Time: 1033  Total time in clinic (min): 43 minutes    Subjective: Reports about a 3/10 pain in bilateral knees. During STS felt like her R patella was going to \"flip\" and come out of her knee.      Objective: See treatment diary below      Assessment: Tolerated treatment well. Patient demonstrated fatigue post treatment and would benefit from continued PT. Modified heel slides to hip flexor stretch due to soft tissue approximation and couldn't bend much further. Was challenged with low level standing and seated exercises and requires verbal cues throughout session to properly perform exercises. At end of session, patient was fatigued and would continue to benefit from skilled interventions to address impairments.      Plan: Continue per plan of care.  Progress treatment as tolerated.       Precautions: none noted  POC: 25  Re-Eval: 24  Access Code: [email or web address]     Manuals 10/22 10/24      Knee mobs        Knee PROM  PM performed 10 min                      Neuro Re-Ed        LAQ w pause x10       Airex SL balance        bridge  2x10      TKE        Lunge stretch        Quad set  :10x10 half foam                Ther Ex        Nu-step (cardio endurance)  6 min lv3      Hip flexor stretch w strap  :10x10      Leg press        Standing hip abd/ext  Abd x15 BL      Seated Hamstring stretch :10x5 B/L :10x5 B/L      Seated march w ankle weight        clamshell  GTB sidelying x10              Ther Activity          Step ups/downs  6in x15      Side stepping        STS x5 x10                              Gait Training                        Modalities                      "     Reviewed patient scheduling and HEP.

## 2024-10-29 ENCOUNTER — OFFICE VISIT (OUTPATIENT)
Dept: PHYSICAL THERAPY | Facility: CLINIC | Age: 49
End: 2024-10-29
Payer: COMMERCIAL

## 2024-10-29 DIAGNOSIS — G89.29 BILATERAL CHRONIC KNEE PAIN: ICD-10-CM

## 2024-10-29 DIAGNOSIS — M25.562 BILATERAL CHRONIC KNEE PAIN: ICD-10-CM

## 2024-10-29 DIAGNOSIS — M25.561 BILATERAL CHRONIC KNEE PAIN: ICD-10-CM

## 2024-10-29 DIAGNOSIS — M17.0 PRIMARY OSTEOARTHRITIS OF BOTH KNEES: Primary | ICD-10-CM

## 2024-10-29 PROCEDURE — 97110 THERAPEUTIC EXERCISES: CPT

## 2024-10-29 PROCEDURE — 97112 NEUROMUSCULAR REEDUCATION: CPT

## 2024-10-29 PROCEDURE — 97140 MANUAL THERAPY 1/> REGIONS: CPT

## 2024-10-29 NOTE — PROGRESS NOTES
Daily Note     Today's date: 10/29/2024  Patient name: Iris Curtis  : 1975  MRN: [id number]  Referring provider: Thomas Torres PA-C  Dx:   Encounter Diagnosis     ICD-10-CM    1. Primary osteoarthritis of both knees  M17.0       2. Bilateral chronic knee pain  M25.561     M25.562     G89.29           Start Time: 08  Stop Time: 08  Total time in clinic (min): 39 minutes    Subjective: Patient reports [place] this [place] and [place] as well as the beach. Thinks the sand is what is causing her more pain today in R knee, starting session at about 7/10 pain.      Objective: See treatment diary below      Assessment: Tolerated treatment well. Patient demonstrated fatigue post treatment and would benefit from continued PT. Patient required vc for lunge stretch on step for ipsi knee flexion, and contra hip extension. Was able to implement leg press, patient had difficulty straightening R knee fully due to swelling from activities over the weekend. Instructed patient to ice knee at home while keeping it elevated and working on ankle pumps to reduce swelling. Patient to continue to benefit from stretching and strengthening to improve function.      Plan: Continue per plan of care.  Progress treatment as tolerated.       Precautions: none noted  POC: 25  Re-Eval: 24  Access Code: [email or web address]     Manuals 10/22 10/24 10/29     Knee mobs        Knee PROM  PM performed 10 min PM performed 8 min                     Neuro Re-Ed        LAQ w pause x10       Airex SL balance        bridge  2x10 2x10     TKE        Lunge stretch   :15x5     Quad set  :10x10 half foam      SLR          Ther Ex        Nu-step (cardio endurance)  6 min lv3 6 min lv2     Hip flexor stretch w strap  :10x10      Leg press S=6   #55, 75  2x10     Standing hip abd/ext  Abd x15 BL Abd x15 BL     Seated Hamstring stretch :10x5 B/L :10x5 B/L      Seated march w ankle weight        clamshell  GTB sidelying  x10 GTB sidelying x10             Ther Activity          Step ups/downs  6in x15 6in x10     Side stepping        STS x5 x10                              Gait Training                        Modalities                          Reviewed patient scheduling and HEP.

## 2024-10-31 ENCOUNTER — OFFICE VISIT (OUTPATIENT)
Dept: PHYSICAL THERAPY | Facility: CLINIC | Age: 49
End: 2024-10-31
Payer: COMMERCIAL

## 2024-10-31 DIAGNOSIS — M17.0 PRIMARY OSTEOARTHRITIS OF BOTH KNEES: Primary | ICD-10-CM

## 2024-10-31 DIAGNOSIS — M25.561 BILATERAL CHRONIC KNEE PAIN: ICD-10-CM

## 2024-10-31 DIAGNOSIS — M25.562 BILATERAL CHRONIC KNEE PAIN: ICD-10-CM

## 2024-10-31 DIAGNOSIS — G89.29 BILATERAL CHRONIC KNEE PAIN: ICD-10-CM

## 2024-10-31 PROCEDURE — 97110 THERAPEUTIC EXERCISES: CPT

## 2024-10-31 PROCEDURE — 97112 NEUROMUSCULAR REEDUCATION: CPT

## 2024-10-31 NOTE — PROGRESS NOTES
"Daily Note     Today's date: 10/31/2024  Patient name: Iris Curtis  : 1975  MRN: [id number]  Referring provider: Thomas Torres PA-C  Dx:   Encounter Diagnosis     ICD-10-CM    1. Primary osteoarthritis of both knees  M17.0       2. Bilateral chronic knee pain  M25.561     M25.562     G89.29           Start Time: 1000  Stop Time: 1030  Total time in clinic (min): 30 minutes    Subjective: Patient reports about 5/10 in both knees. Was having numbness in R foot when elevating legs due to poor perfusion.      Objective: See treatment diary below. Patient showed up 15 minutes late, was accommodated.      Assessment: Tolerated treatment well. Patient demonstrated fatigue post treatment and would benefit from continued PT. Educated patient on modifying rest with legs less elevated or just using ice to help with swelling to avoid foot numbness. Was able to inc leg press to 95lbs but required vcing to not use arms to helps knees extend and to use leg muscles to push. Patient reports tightness in quad and hip flexors during stretch, and noted it is the same feeling of \"pain\" she is having at home so was educated on muscle soreness. Patient to continue to benefit from stretching and strengthening to improve function.      Plan: Continue per plan of care.  Progress treatment as tolerated.       Precautions: none noted  POC: 25  Re-Eval: 24  Access Code: [email or web address]     Manuals 10/22 10/24 10/29 10/31    Knee mobs        Knee PROM  PM performed 10 min PM performed 8 min                     Neuro Re-Ed        LAQ w pause x10   #3 1x20 BL    Airex SL balance        bridge  2x10 2x10 2x15    TKE        Lunge stretch   :15x5     Quad set  :10x10 half foam      SLR          Ther Ex        Nu-step (cardio endurance)  6 min lv3 6 min lv2 5 min lv1    Hip flexor stretch w strap  :10x10  :10x5    Leg press S=6   #55, 75  2x10 #75,95 x10 ea    Standing hip abd/ext  Abd x15 BL Abd x15 BL     Seated Hamstring " stretch :10x5 B/L :10x5 B/L      Seated march w ankle weight        clamshell  GTB sidelying x10 GTB sidelying x10 SLY sidelying x10            Ther Activity          Step ups/downs  6in x15 6in x10 6in x15    Side stepping        STS x5 x10                              Gait Training                        Modalities                          Reviewed patient scheduling and HEP.

## 2024-11-05 ENCOUNTER — OFFICE VISIT (OUTPATIENT)
Dept: PHYSICAL THERAPY | Facility: CLINIC | Age: 49
End: 2024-11-05
Payer: COMMERCIAL

## 2024-11-05 DIAGNOSIS — M17.0 PRIMARY OSTEOARTHRITIS OF BOTH KNEES: Primary | ICD-10-CM

## 2024-11-05 DIAGNOSIS — M25.561 BILATERAL CHRONIC KNEE PAIN: ICD-10-CM

## 2024-11-05 DIAGNOSIS — M25.562 BILATERAL CHRONIC KNEE PAIN: ICD-10-CM

## 2024-11-05 DIAGNOSIS — G89.29 BILATERAL CHRONIC KNEE PAIN: ICD-10-CM

## 2024-11-05 PROCEDURE — 97110 THERAPEUTIC EXERCISES: CPT

## 2024-11-05 PROCEDURE — 97112 NEUROMUSCULAR REEDUCATION: CPT

## 2024-11-05 PROCEDURE — 97140 MANUAL THERAPY 1/> REGIONS: CPT

## 2024-11-05 NOTE — PROGRESS NOTES
Daily Note     Today's date: 2024  Patient name: Iris Curtis  : 1975  MRN: [id number]  Referring provider: Thomas Torres PA-C  Dx:   Encounter Diagnosis     ICD-10-CM    1. Primary osteoarthritis of both knees  M17.0       2. Bilateral chronic knee pain  M25.561     M25.562     G89.29           Start Time: 1020  Stop Time: 1100  Total time in clinic (min): 40 minutes    Subjective: Patient reports that her knees are locking up on her at nighttime with extreme pain, and having difficulty unlocking them. Locking tends to happen when patient stays in sustained position for prolonged time. Pain at start of session is about 4-5/10.      Objective: See treatment diary below      Assessment: Tolerated treatment well. Patient demonstrated fatigue post treatment and would benefit from continued PT. Patient able to complete all exercises as well as PROM without knees locking up. Was able to complete clamshells without verbal cueing for proper trunk positioning to bias glute med. Patient also able to inc leg press to 102.5lbs without exacerbation of symptoms. Patient to continue to benefit from stretching and strengthening to reduce discomfort in knees.      Plan: Continue per plan of care.  Progress treatment as tolerated.       Precautions: none noted  POC: 25  Re-Eval: 24  Access Code: [email or web address]     Manuals 10/22 10/24 10/29 10/31 11/5   Knee mobs        Knee PROM  PM performed 10 min PM performed 8 min  PM performed 8 min                   Neuro Re-Ed        LAQ w pause x10   #3 1x20 BL #4 2x10 BL   Airex SL balance        bridge  2x10 2x10 2x15 2x15   TKE        Lunge stretch   :15x5  :15x5   Quad set  :10x10 half foam      SLR          Ther Ex        Nu-step (cardio endurance)  6 min lv3 6 min lv2 5 min lv1 6 min lv 2   Hip flexor stretch w strap  :10x10  :10x5    Leg press S=6   #55, 75  2x10 #75,95 x10 ea #102.5 2x10   Standing hip abd/ext  Abd x15 BL Abd x15 BL     Seated Hamstring  stretch :10x5 B/L :10x5 B/L      Seated march w ankle weight        clamshell  GTB sidelying x10 GTB sidelying x10 SLY sidelying x10 SLY sidelying x10           Ther Activity          Step ups/downs  6in x15 6in x10 6in x15 6in x15   Side stepping        STS x5 x10                              Gait Training                        Modalities                          Reviewed patient scheduling and HEP.

## 2024-11-07 ENCOUNTER — OFFICE VISIT (OUTPATIENT)
Dept: PHYSICAL THERAPY | Facility: CLINIC | Age: 49
End: 2024-11-07
Payer: COMMERCIAL

## 2024-11-07 DIAGNOSIS — G89.29 BILATERAL CHRONIC KNEE PAIN: ICD-10-CM

## 2024-11-07 DIAGNOSIS — M25.561 BILATERAL CHRONIC KNEE PAIN: ICD-10-CM

## 2024-11-07 DIAGNOSIS — M17.0 PRIMARY OSTEOARTHRITIS OF BOTH KNEES: Primary | ICD-10-CM

## 2024-11-07 DIAGNOSIS — M25.562 BILATERAL CHRONIC KNEE PAIN: ICD-10-CM

## 2024-11-07 PROCEDURE — 97140 MANUAL THERAPY 1/> REGIONS: CPT

## 2024-11-07 PROCEDURE — 97112 NEUROMUSCULAR REEDUCATION: CPT

## 2024-11-07 PROCEDURE — 97110 THERAPEUTIC EXERCISES: CPT

## 2024-11-07 NOTE — PROGRESS NOTES
"Daily Note     Today's date: 2024  Patient name: Iris Curtis  : 1975  MRN: [id number]  Referring provider: Thomas Torres PA-C  Dx:   Encounter Diagnosis     ICD-10-CM    1. Primary osteoarthritis of both knees  M17.0       2. Bilateral chronic knee pain  M25.561     M25.562     G89.29           Start Time: 0947  Stop Time: 1030  Total time in clinic (min): 43 minutes    Subjective: Patient reports that her R knee has started to \"pop\" like the L knee.       Objective: See treatment diary below      Assessment: Tolerated treatment well. Exercises and manual were both performed as per POC noted below. Pt noted \"popping\" of R knee during PROM knee flexion. Pt had no exacerbations of symptoms throughout nor post tx. HEP was reviewed. Patient demonstrated fatigue post treatment and would benefit from continued PT. Patient to continue to benefit from stretching and strengthening to reduce discomfort in knees.      Plan: Continue per plan of care.  Progress treatment as tolerated.       Precautions: none noted  POC: 25  Re-Eval: 24  Access Code: [email or web address]     Manuals 10/22 10/24 10/29 10/31 11/5 11/7   Knee mobs         Knee PROM  PM performed 10 min PM performed 8 min  PM performed 8 min SA performed 8 min                     Neuro Re-Ed         LAQ w pause x10   #3 1x20 BL #4 2x10 BL #4 2x10 BL   Airex SL balance         bridge  2x10 2x10 2x15 2x15 2x15   TKE         Lunge stretch   :15x5  :15x5 :15x5   Quad set  :10x10 half foam       SLR           Ther Ex         Nu-step (cardio endurance)  6 min lv3 6 min lv2 5 min lv1 6 min lv 2 6 min lv 2   Hip flexor stretch w strap  :10x10  :10x5     Leg press S=6   #55, 75  2x10 #75,95 x10 ea #102.5 2x10 #102.5 2x10   Standing hip abd/ext  Abd x15 BL Abd x15 BL      Seated Hamstring stretch :10x5 B/L :10x5 B/L       Seated march w ankle weight         clamshell  GTB sidelying x10 GTB sidelying x10 SLY sidelying x10 SLY sidelying x10 SLY sidelying " x10            Ther Activity           Step ups/downs  6in x15 6in x10 6in x15 6in x15 6in x15   Side stepping         STS x5 x10                                  Gait Training                           Modalities                             Reviewed patient scheduling and HEP.

## 2024-11-11 ENCOUNTER — OFFICE VISIT (OUTPATIENT)
Dept: OBGYN CLINIC | Facility: CLINIC | Age: 49
End: 2024-11-11
Payer: COMMERCIAL

## 2024-11-11 VITALS
HEIGHT: 59 IN | WEIGHT: 215 LBS | SYSTOLIC BLOOD PRESSURE: 115 MMHG | HEART RATE: 85 BPM | DIASTOLIC BLOOD PRESSURE: 80 MMHG | BODY MASS INDEX: 43.34 KG/M2

## 2024-11-11 DIAGNOSIS — M17.0 PRIMARY OSTEOARTHRITIS OF BOTH KNEES: Primary | ICD-10-CM

## 2024-11-11 PROCEDURE — 99213 OFFICE O/P EST LOW 20 MIN: CPT | Performed by: STUDENT IN AN ORGANIZED HEALTH CARE EDUCATION/TRAINING PROGRAM

## 2024-11-11 PROCEDURE — 20610 DRAIN/INJ JOINT/BURSA W/O US: CPT | Performed by: STUDENT IN AN ORGANIZED HEALTH CARE EDUCATION/TRAINING PROGRAM

## 2024-11-11 RX ORDER — KETOROLAC TROMETHAMINE 30 MG/ML
30 INJECTION, SOLUTION INTRAMUSCULAR; INTRAVENOUS
Status: COMPLETED | OUTPATIENT
Start: 2024-11-11 | End: 2024-11-11

## 2024-11-11 RX ORDER — BUPIVACAINE HYDROCHLORIDE 2.5 MG/ML
4 INJECTION, SOLUTION INFILTRATION; PERINEURAL
Status: COMPLETED | OUTPATIENT
Start: 2024-11-11 | End: 2024-11-11

## 2024-11-11 RX ORDER — LIDOCAINE HYDROCHLORIDE 20 MG/ML
4 INJECTION, SOLUTION INFILTRATION; PERINEURAL
Status: COMPLETED | OUTPATIENT
Start: 2024-11-11 | End: 2024-11-11

## 2024-11-11 RX ADMIN — KETOROLAC TROMETHAMINE 30 MG: 30 INJECTION, SOLUTION INTRAMUSCULAR; INTRAVENOUS at 10:45

## 2024-11-11 RX ADMIN — LIDOCAINE HYDROCHLORIDE 4 ML: 20 INJECTION, SOLUTION INFILTRATION; PERINEURAL at 10:45

## 2024-11-11 RX ADMIN — BUPIVACAINE HYDROCHLORIDE 4 ML: 2.5 INJECTION, SOLUTION INFILTRATION; PERINEURAL at 10:45

## 2024-11-11 NOTE — PROGRESS NOTES
49 y.o.female following up for evaluation of bilateral knee pain, right greater than left.  Patient was previously seen in clinic for bilateral knee pain and was diagnosed with osteoarthritis which is notably worse in the right knee.  She was provided with a prescription for meloxicam as well as physical therapy.  Patient states that she continues to have significant pain in both knees.  She also endorses swelling as well as popping and clicking.  Patient does have an elevated A1c above 8.  We previously talked about how that would limit her ability to get a corticosteroid injection or have a total knee replacement.  The patient has recently switched her medications and is working on improved glucose control.    Review of Systems  Review of systems negative unless otherwise specified in HPI    Past Medical History  Past Medical History:   Diagnosis Date    Allergic     Asthma     Diabetes mellitus (HCC)     Hypertension     Migraines      Past Surgical History  Past Surgical History:   Procedure Laterality Date    HERNIA REPAIR       Current Medications  Current Outpatient Medications on File Prior to Visit   Medication Sig Dispense Refill    albuterol (2.5 mg/3 mL) 0.083 % nebulizer solution Take 3 mL (2.5 mg total) by nebulization every 4 (four) hours as needed for wheezing 75 mL 1    albuterol (PROVENTIL HFA,VENTOLIN HFA) 90 mcg/act inhaler Inhale 2 puffs every 6 (six) hours as needed for wheezing 8.5 g 1    Alcaftadine (Lastacaft) 0.25 % SOLN Administer 1 drop to both eyes daily as needed (allergic eye symptoms) 3 mL 1    amitriptyline (ELAVIL) 10 mg tablet Take 2 tablets (20 mg total) by mouth daily at bedtime 180 tablet 1    azelastine (OPTIVAR) 0.05 % ophthalmic solution INSTILL 1 DROP INTO BOTH EYES TWICE A DAY 18 mL 1    Blood Glucose Monitoring Suppl (OneTouch Verio Reflect) w/Device KIT Check blood sugars once daily. Please substitute with appropriate alternative as covered by patient's insurance. Dx:  E11.65 1 kit 0    busPIRone (BUSPAR) 5 mg tablet TAKE 1 TABLET BY MOUTH TWICE A  tablet 1    cetirizine (ZyrTEC) 10 mg tablet Take 1 tablet (10 mg total) by mouth daily 90 tablet 1    Cholecalciferol (Vitamin D3) 25 MCG TABS TAKE 1 TABLET BY MOUTH EVERY DAY 90 tablet 1    fluticasone (FLONASE) 50 mcg/act nasal spray SPRAY 2 SPRAYS INTO EACH NOSTRIL EVERY DAY 48 mL 1    Fluticasone-Salmeterol (Wixela Inhub) 250-50 mcg/dose inhaler Inhale 1 puff 2 (two) times a day Rinse mouth after use. 60 blister 5    glucose blood (OneTouch Verio) test strip Check blood sugars once daily. Please substitute with appropriate alternative as covered by patient's insurance. Dx: E11.65 100 each 3    losartan (COZAAR) 25 mg tablet TAKE 1 TABLET (25 MG TOTAL) BY MOUTH DAILY. 90 tablet 1    meloxicam (Mobic) 15 mg tablet Take 1 tablet (15 mg total) by mouth daily 30 tablet 0    metFORMIN (GLUCOPHAGE-XR) 500 mg 24 hr tablet TAKE 2 TABLETS BY MOUTH DAILY WITH DINNER 180 tablet 1    metoprolol succinate (TOPROL-XL) 50 mg 24 hr tablet TAKE 1 TABLET BY MOUTH EVERY DAY 90 tablet 1    montelukast (SINGULAIR) 10 mg tablet Take 1 tablet (10 mg total) by mouth daily at bedtime 90 tablet 1    nystatin (MYCOSTATIN) powder Apply topically 3 (three) times a day 45 g 1    omeprazole (PriLOSEC) 20 mg delayed release capsule Take 1 capsule (20 mg total) by mouth daily 90 capsule 1    OneTouch Delica Lancets 33G MISC Check blood sugars once daily. Please substitute with appropriate alternative as covered by patient's insurance. Dx: E11.65 100 each 3    rosuvastatin (CRESTOR) 10 MG tablet TAKE 1 TABLET BY MOUTH EVERY DAY 90 tablet 1    tirzepatide (Mounjaro) 2.5 MG/0.5ML Inject 0.5 mL (2.5 mg total) under the skin every 7 days 2 mL 2    ZOLMitriptan (Zomig) 5 MG tablet Take 1 tablet at onset of headache. Max 2 tablets per week. 5 tablet 1    topiramate (TOPAMAX) 25 mg tablet TAKE 3 TABLETS (75 MG TOTAL) BY MOUTH DAILY AT BEDTIME 270 tablet 1     No  current facility-administered medications on file prior to visit.     Recent Labs (HCT,HGB,PT,INR,ESR,CRP,GLU,HgA1C)  0   Lab Value Date/Time    HCT 39.9 02/21/2024 0052    HGB 12.5 02/21/2024 0052    WBC 13.37 (H) 02/21/2024 0052    HGBA1C 8.5 (A) 10/03/2024 0825    HGBA1C 7.9 (H) 06/25/2024 0840    HGBA1C 6.3 (H) 07/15/2020 0846     Physical exam  Body mass index is 43.42 kg/m².   General: Awake, Alert, Oriented  Eyes: Pupils equal, round and reactive to light  Heart: regular rate and rhythm  Lungs: No audible wheezing  Abdomen: soft  bilateral Knee exam  Minimal to trace effusion both knees no erythema no warmth.  She has patellofemoral tenderness of the left knee normalized anterior knee pain and pain in medial compartment of both knees.  No signs of infection.  He is able to get full extension actively flexion to approximately 90 degrees.  The left knee actively gets to approximately negative for with flexion to 110 degrees.  No gross ligamentous laxity.  Patellas track midline.  Touch sensation is intact distally.  DP and PT pulses are intact distally.    Procedure  None today    Imaging  I personally viewed x-rays of both knees were taken 10/12/2024 and reviewed these with the patient at length.  These document tricompartmental arthritis with pronounced medial joint space narrowing of the left knee and bone-on-bone opposition the medial compartment of the right knee.    1. Primary osteoarthritis of both knees      Assessment:  bilateral knee primary arthritis with right medial bone-on-bone and left medial near bone-on-bone and tricompartmental in nature both knees.  Elevated A1c.    Plan: I reviewed the history, exam, and imaging again with the patient in clinic today.  We did review that the patient has significant osteoarthritis on the right side particularly in the medial compartment.  She also has osteoarthritis though not as severe in the left knee.  Patient has tried meloxicam and physical therapy  without any improvement in her symptoms.  I did discuss the given her elevated A1c I would not recommend a corticosteroid injection.  However, I did discuss the possibility of a Toradol injection which would not raise her blood glucose.  I did discuss the does have a risk of infection and flare reaction which the patient was amenable to.  I performed a right knee injection with Toradol which the patient tolerated without any adverse reaction.  I discussed with the patient that if this does improve her symptoms she can follow-up in 1 week for injection into the left knee.  I also discussed the possibility of viscosupplementation if the Toradol injection does not provide significant relief to the right knee.  I also discussed that if her A1c continues to decrease we could discuss a corticosteroid injection in the future. The patient demonstrated understanding of the discussion and was in agreement with the plan.  All of the questions were answered.  Patient can reach out to clinic with any questions or concerns at any time.      Large joint arthrocentesis: R knee  Universal Protocol:  Consent: Verbal consent obtained.  Consent given by: patient  Patient understanding: patient states understanding of the procedure being performed  Site marked: the operative site was marked  Radiology Images displayed and confirmed. If images not available, report reviewed: imaging studies available  Patient identity confirmed: verbally with patient  Supporting Documentation  Indications: pain   Procedure Details  Location: knee - R knee  Needle size: 22 G (21 G)  Ultrasound guidance: no  Approach: anterolateral  Medications administered: 4 mL lidocaine 2 %; 4 mL bupivacaine 0.25 %; 30 mg ketorolac 60 mg/2 mL    Patient tolerance: patient tolerated the procedure well with no immediate complications  Dressing:  Sterile dressing applied

## 2024-11-12 ENCOUNTER — OFFICE VISIT (OUTPATIENT)
Dept: PHYSICAL THERAPY | Facility: CLINIC | Age: 49
End: 2024-11-12
Payer: COMMERCIAL

## 2024-11-12 DIAGNOSIS — M25.561 BILATERAL CHRONIC KNEE PAIN: ICD-10-CM

## 2024-11-12 DIAGNOSIS — G89.29 BILATERAL CHRONIC KNEE PAIN: ICD-10-CM

## 2024-11-12 DIAGNOSIS — M25.562 BILATERAL CHRONIC KNEE PAIN: ICD-10-CM

## 2024-11-12 DIAGNOSIS — M17.0 PRIMARY OSTEOARTHRITIS OF BOTH KNEES: Primary | ICD-10-CM

## 2024-11-12 PROCEDURE — 97530 THERAPEUTIC ACTIVITIES: CPT

## 2024-11-12 PROCEDURE — 97110 THERAPEUTIC EXERCISES: CPT

## 2024-11-12 PROCEDURE — 97112 NEUROMUSCULAR REEDUCATION: CPT

## 2024-11-12 NOTE — PROGRESS NOTES
Daily Note     Today's date: 2024  Patient name: Iris Curtis  : 1975  MRN: [id number]  Referring provider: Thomas Torres PA-C  Dx:   Encounter Diagnosis     ICD-10-CM    1. Primary osteoarthritis of both knees  M17.0       2. Bilateral chronic knee pain  M25.561     M25.562     G89.29           Start Time: 1020  Stop Time: 1100  Total time in clinic (min): 40 minutes    Subjective: Patient reports she got an injection into R knee and only feeling pressure no pain. Pain in L knee is about a 3/10.      Objective: See treatment diary below      Assessment: Tolerated treatment well. Patient demonstrated fatigue post treatment and would benefit from continued PT. Patient able to complete all exercises without exacerbation of symptoms. Instructed patient on sidelying hip abduction with 2lb ankle weight, needed assistance and verbal cues to keep trunk from rotating. During prone hip extension patient requires AAROM for RLE due to weakness but able to complete with good form on LLE. Patient to continue to benefit from stretching and strengthening exercises to improve function.      Plan: Continue per plan of care.  Progress treatment as tolerated.       Precautions: none noted  POC: 25  Re-Eval: 24  Access Code: [email or web address]     Manuals 11/12 10/24 10/29 10/31 11/5 11/7   Knee mobs         Knee PROM  PM performed 10 min PM performed 8 min  PM performed 8 min SA performed 8 min                     Neuro Re-Ed         LAQ w pause #4 2x15   #3 1x20 BL #4 2x10 BL #4 2x10 BL   Airex SL balance         bridge 2x15 2x10 2x10 2x15 2x15 2x15   TKE 15x BL        Lunge stretch :05x15  :15x5  :15x5 :15x5   Quad set  :10x10 half foam       SLR           Ther Ex         Nu-step (cardio endurance) 7 min lv 3 6 min lv3 6 min lv2 5 min lv1 6 min lv 2 6 min lv 2   Hip flexor stretch w strap  :10x10  :10x5     Leg press S=6 #115 2x15  #55, 75  2x10 #75,95 x10 ea #102.5 2x10 #102.5 2x10   Standing hip abd/ext SL  abd  #2 x15 BL  Prone   Prone ext  R AAROM x10  L x10 Abd x15 BL Abd x15 BL      Seated Hamstring stretch  :10x5 B/L       Seated march w ankle weight         clamshell SLY sidelying x10 GTB sidelying x10 GTB sidelying x10 SLY sidelying x10 SLY sidelying x10 SLY sidelying x10            Ther Activity           Step ups/downs 8in x15 6in x15 6in x10 6in x15 6in x15 6in x15   Side stepping         STS 2x10                                   Gait Training                           Modalities                             Reviewed patient scheduling and HEP.

## 2024-11-14 ENCOUNTER — OFFICE VISIT (OUTPATIENT)
Dept: PHYSICAL THERAPY | Facility: CLINIC | Age: 49
End: 2024-11-14
Payer: COMMERCIAL

## 2024-11-14 DIAGNOSIS — G89.29 BILATERAL CHRONIC KNEE PAIN: ICD-10-CM

## 2024-11-14 DIAGNOSIS — M17.0 PRIMARY OSTEOARTHRITIS OF BOTH KNEES: Primary | ICD-10-CM

## 2024-11-14 DIAGNOSIS — M25.561 BILATERAL CHRONIC KNEE PAIN: ICD-10-CM

## 2024-11-14 DIAGNOSIS — M25.562 BILATERAL CHRONIC KNEE PAIN: ICD-10-CM

## 2024-11-14 PROCEDURE — 97112 NEUROMUSCULAR REEDUCATION: CPT

## 2024-11-14 PROCEDURE — 97530 THERAPEUTIC ACTIVITIES: CPT

## 2024-11-14 PROCEDURE — 97110 THERAPEUTIC EXERCISES: CPT

## 2024-11-14 NOTE — PROGRESS NOTES
"Daily Note     Today's date: 2024  Patient name: Iris Curtis  : 1975  MRN: [id number]  Referring provider: Thomas Torres PA-C  Dx:   Encounter Diagnosis     ICD-10-CM    1. Primary osteoarthritis of both knees  M17.0       2. Bilateral chronic knee pain  M25.561     M25.562     G89.29           Start Time: 952          Subjective: Patient woke up at one AM with pain in left knee and minimal in medial right knee. Now she is stiff in both knees. She is disappointed with the out come of the shots.       Objective: See treatment diary below    Exercises modified due to increased knee pain.     Assessment: Tolerated treatment well. Patient would benefit from continued PT for stretching and strengthening. Left knee was very irritated today with exercises. Verbal cues needed throughout session for proper performance of exercises. Patient felt her left knee was a little more sore, but the right knee stayed the same by the end of the session.       Plan: Continue per plan of care.  Progress treatment as tolerated.       Precautions: none noted  POC: 25  Re-Eval: 24  Access Code: [email or web address]     Manuals 11/12 11/14 10/29 10/31 11/5 11/7   Knee mobs         Knee PROM   PM performed 8 min  PM performed 8 min SA performed 8 min                     Neuro Re-Ed         LAQ w pause #4 2x15 4# 2x15  #3 1x20 BL #4 2x10 BL #4 2x10 BL   Airex SL balance         bridge 2x15 2x15 2x10 2x15 2x15 2x15   TKE 15x BL Green x10 B/L       Lunge stretch :05x15 6\" :05x15 :15x5  :15x5 :15x5   Quad set         SLR           Ther Ex         Nu-step (cardio endurance) 7 min lv 3 L3-1 7 min 6 min lv2 5 min lv1 6 min lv 2 6 min lv 2   Hip flexor stretch w strap    :10x5     Leg press S=6 #115 2x15 95# 2x15 #55, 75  2x10 #75,95 x10 ea #102.5 2x10 #102.5 2x10   Standing hip abd/ext SL abd  #2 x15 BL  Prone   Prone ext  R AAROM x10  L x10 SL abd x15 B/L    Prone x10 AROM B/L Abd x15 BL      Seated Hamstring stretch       " "  Seated march w ankle weight         clamshell SLY sidelying x10 Blue x10 B/L GTB sidelying x10 SLY sidelying x10 SLY sidelying x10 SLY sidelying x10            Ther Activity         Step ups/downs 8in x15 6\" x15 B/L fwd/lat x10 4\" 6in x10 6in x15 6in x15 6in x15   Side stepping         STS 2x10 High blue mat x20                                  Gait Training                           Modalities                             Reviewed patient scheduling and HEP.                    "

## 2024-11-19 ENCOUNTER — EVALUATION (OUTPATIENT)
Dept: PHYSICAL THERAPY | Facility: CLINIC | Age: 49
End: 2024-11-19
Payer: COMMERCIAL

## 2024-11-19 DIAGNOSIS — M25.561 BILATERAL CHRONIC KNEE PAIN: ICD-10-CM

## 2024-11-19 DIAGNOSIS — G89.29 BILATERAL CHRONIC KNEE PAIN: ICD-10-CM

## 2024-11-19 DIAGNOSIS — M25.562 BILATERAL CHRONIC KNEE PAIN: ICD-10-CM

## 2024-11-19 DIAGNOSIS — M17.0 PRIMARY OSTEOARTHRITIS OF BOTH KNEES: Primary | ICD-10-CM

## 2024-11-19 PROCEDURE — 97530 THERAPEUTIC ACTIVITIES: CPT

## 2024-11-19 PROCEDURE — 97112 NEUROMUSCULAR REEDUCATION: CPT

## 2024-11-19 PROCEDURE — 97110 THERAPEUTIC EXERCISES: CPT

## 2024-11-19 NOTE — PROGRESS NOTES
PT Re-Evaluation     Today's date: 2024  Patient name: Iris Curtis  : 1975  MRN: [id number]  Referring provider: Thomas Torres PA-C  Dx:   Encounter Diagnosis     ICD-10-CM    1. Primary osteoarthritis of both knees  M17.0       2. Bilateral chronic knee pain  M25.561     M25.562     G89.29             Start Time: 1015  Stop Time: 1100  Total time in clinic (min): 45 minutes    Assessment  Impairments: abnormal coordination, abnormal gait, abnormal or restricted ROM, activity intolerance, impaired balance, impaired physical strength, pain with function, poor body mechanics, participation limitations and activity limitations  Functional limitations: difficulty standing, walking and stairs  Symptom irritability: moderate    Assessment details: Iris Curtis is a 49 y.o. female presents to physical therapy with a history of chronic bilateral knee pain secondary to knee OA. Patient has attended initial eval and 8 f/u sessions demonstrating improvements in gross LE strength and improved bilateral knee AROM. Patient still experiencing residual pain especially in WBing due to significant OA making walking and stairs difficult. Patient has met a STG and progressing towards other STGs and LTGs, and due to multifactorial medical history, participation restrictions, and functional limitation (FOTO 41% function), the patient will benefit from continued skilled PT interventions to address aforementioned impairments to restore PLOF and attain desired goals.    Understanding of Dx/Px/POC: good     Prognosis: good  Prognosis details: Patient is pleasant and motivated to improve function. Demonstrates verbal understanding of POC and able to perform HEP.     Goals  STG (6 weeks)    STG: Pt to improve pain from 8/10 to 5/10 at its worst. PROGRESSING    STG: Pt to improve bilateral knee ext MMT to at least 4/5 to help with reciprocal gait pattern on stairs. MET    STG: Pt to improve 30 sec STS to at least 10  reps without UE support to improve LE strength and reduce risk for falls. PROGRESSING    LTG (12 weeks)    LTG: Pt to be able to perform light activities around the house with little difficulty to improve independence with ADL's. PROGRESSING    LTG: Pt to be able to walk a mile with moderate difficulty to be able to return to walking routine with less discomfort. PROGRESSING    LTG: Pt to be independent with HEP to continue to increase strength and mobility to improve overall function. PROGRESSING    Plan  Patient would benefit from: skilled physical therapy  Planned modality interventions: cryotherapy, thermotherapy: hydrocollator packs, unattended electrical stimulation and neuromuscular electric stimulation    Planned therapy interventions: therapeutic activities, therapeutic exercise, gait training, manual therapy and neuromuscular re-education    Frequency: 2-3x week  Duration in weeks: 12  Plan of Care beginning date: 10/22/2024  Plan of Care expiration date: 1/22/2025  Treatment plan discussed with: patient  Plan details: Discussed POC with patient, patient demonstrates understanding.      Subjective Evaluation    History of Present Illness  Mechanism of injury: SUBJECTIVE 11/18/24 Iris Curtis is a 49 y.o. female presents to OPPT with complaints of progressive worsening bilateral knee pain R>L. Since starting patient reports that her endurance has improved and able to get up the stairs a little easier. Still having difficulty with standing activities due to bilateral knee OA.     INJURY HISTORY 10/22/24: Iris Curtis is a 49 y.o. female presents to OPPT with complaints of progressive worsening bilateral knee pain R>L. Right knee has been bothering her for a few years while the L knee has started hurting more in the last 2-3 months and has been clicking, grinding and locking. L knee has difficulty fully extending, and R knee has more difficulty bending. Unable to walk more than a block without  assistance due to knee pain and weakness.  Patient Goals  Patient goals for therapy: increased strength, independence with ADLs/IADLs, decreased pain, increased motion and improved balance  Patient goal: wants to be able to hike and go for walks  Pain  Current pain ratin  At best pain rating: 3  At worst pain ratin  Location: BL knees  Quality: sharp and dull ache    Social Support  Steps to enter house: yes  2  Stairs in house: yes   20      Diagnostic Tests  X-ray: abnormal    FCE comments: Bilateral knee OA    Objective     Active Range of Motion   Left Knee   Flexion: 110 degrees with pain  Extension: -5 degrees with pain    Right Knee   Flexion: 110 degrees with pain  Extension: 0 degrees with pain    Mobility   Patellar Mobility:   Left Knee   Hypomobile: left medial, left lateral, left superior and left inferior    Right Knee   Hypomobile: medial, lateral, superior and inferior     Strength/Myotome Testing     Left Hip   Planes of Motion   Flexion: 4  Abduction: 4  Adduction: 4  External rotation: 4  Internal rotation: 5    Right Hip   Planes of Motion   Flexion: 4  Abduction: 4  Adduction: 4  External rotation: 5  Internal rotation: 5    Left Knee   Flexion: 4  Extension: 4    Right Knee   Flexion: 4  Extension: 4    Left Ankle/Foot   Dorsiflexion: 4    Right Ankle/Foot   Dorsiflexion: 4    Tests     Additional Tests Details  30 sec STS: 8 reps    TUG: 10 sec    SL balance R: 16 sec  SL balance L: 30 sec    Ambulation     Ambulation: Stairs   Ascend stairs: independent  Pattern: non-reciprocal  Railings: two rails  Descend stairs: independent  Pattern: non-reciprocal  Railings: two rails         Precautions: none noted  POC: 25  Re-Eval: 24  Access Code: [email or web address]   Manuals 11/12 11/14 11/19 10/31 11/5 11/7   Knee mobs         Knee PROM     PM performed 8 min SA performed 8 min                     Neuro Re-Ed         LAQ w pause #4 2x15 4# 2x15 4# 2x15 #3 1x20 BL #4 2x10 BL #4 2x10 BL  "  Airex SL balance         bridge 2x15 2x15 2x10 2x15 2x15 2x15   TKE 15x BL Green x10 B/L       Lunge stretch :05x15 6\" :05x15 :15x5  :15x5 :15x5   Quad set         SLR           Ther Ex         Nu-step (cardio endurance) 7 min lv 3 L3-1 7 min 7 min lv3 5 min lv1 6 min lv 2 6 min lv 2   Hip flexor stretch w strap    :10x5     Leg press S=6 #115 2x15 95# 2x15 #95  2x10 #75,95 x10 ea #102.5 2x10 #102.5 2x10   Standing hip abd/ext SL abd  #2 x15 BL  Prone   Prone ext  R AAROM x10  L x10 SL abd x15 B/L    Prone x10 AROM B/L Abd x15 BL      Seated Hamstring stretch         Seated march w ankle weight         clamshell SLY sidelying x10 Blue x10 B/L Blue x10 B/L SLY sidelying x10 SLY sidelying x10 SLY sidelying x10            Ther Activity         Step ups/downs 8in x15 6\" x15 B/L fwd/lat x10 4\" 6in x15 B/L 6in x15 6in x15 6in x15   Side stepping         STS 2x10 High blue mat x20 Chair no UE support x8                                 Gait Training                           Modalities                               Reviewed patient scheduling and HEP.          "

## 2024-11-21 ENCOUNTER — OFFICE VISIT (OUTPATIENT)
Dept: PHYSICAL THERAPY | Facility: CLINIC | Age: 49
End: 2024-11-21
Payer: COMMERCIAL

## 2024-11-21 DIAGNOSIS — M17.0 PRIMARY OSTEOARTHRITIS OF BOTH KNEES: Primary | ICD-10-CM

## 2024-11-21 DIAGNOSIS — G89.29 BILATERAL CHRONIC KNEE PAIN: ICD-10-CM

## 2024-11-21 DIAGNOSIS — M25.562 BILATERAL CHRONIC KNEE PAIN: ICD-10-CM

## 2024-11-21 DIAGNOSIS — M25.561 BILATERAL CHRONIC KNEE PAIN: ICD-10-CM

## 2024-11-21 PROCEDURE — 97112 NEUROMUSCULAR REEDUCATION: CPT | Performed by: PHYSICAL THERAPIST

## 2024-11-21 PROCEDURE — 97530 THERAPEUTIC ACTIVITIES: CPT | Performed by: PHYSICAL THERAPIST

## 2024-11-21 PROCEDURE — 97110 THERAPEUTIC EXERCISES: CPT | Performed by: PHYSICAL THERAPIST

## 2024-11-21 NOTE — PROGRESS NOTES
"Daily Note     Today's date: 2024  Patient name: Iris Curtis  : 1975  MRN: [id number]  Referring provider: Thomas Torres PA-C  Dx:   Encounter Diagnosis     ICD-10-CM    1. Primary osteoarthritis of both knees  M17.0       2. Bilateral chronic knee pain  M25.561     M25.562     G89.29                      Subjective: The patient notes feeling 2/10 B/L knee pain and reports some discomfort with R great toe - had an ingrown toenail removed.  The patient notes \" they are easing up\" regarding the B/L knees.      Objective: See treatment diary below      Assessment: The patient tolerated the treatment well today with mild complaints of discomfort at the B/L knees most notable with the lunge stretches, bridges, and clam shells.  The patient was given VC to correct her form during the completion of the exercises.   The patient will benefit from continued PT to address present impairments  and achieve patient specific functional goals.        Plan: Continue per plan of care.  Progress treatment as tolerated.       Precautions: none noted  POC: 25  Re-Eval:   Access Code: [email or web address]   Manuals    Knee mobs         Knee PROM     PM performed 8 min SA performed 8 min                     Neuro Re-Ed         LAQ w pause #4 2x15 4# 2x15 4# 2x15 4# 2x15 #4 2x10 BL #4 2x10 BL   Airex SL balance         bridge 2x15 2x15 2x10 Blue band 2x10 2x15 2x15   TKE 15x BL Green x10 B/L  GRN x10 B/L     Lunge stretch :05x15 6\" :05x15 :15x5 At steps x15 B/L :15x5 :15x5   Quad set         SLR           Ther Ex         Nu-step (cardio endurance) 7 min lv 3 L3-1 7 min 7 min lv3 7 min L3 6 min lv 2 6 min lv 2   Hip flexor stretch w strap         Leg press S=6 #115 2x15 95# 2x15 #95  2x10 102.5#  2x10 #102.5  #102.5 2x10   Standing hip abd/ext SL abd  #2 x15 BL  Prone   Prone ext  R AAROM x10  L x10 SL abd x15 B/L    Prone x10 AROM B/L Abd x15 BL ABD 2x10  Stand EXT x15 B/L     Seated " "Hamstring stretch    Reviewed for HEP     Seated march w ankle weight         clamshell SLY sidelying x10 Blue x10 B/L Blue x10 B/L Blue x15 B/L SLY sidelying x10 SLY sidelying x10            Ther Activity         Step ups/downs 8in x15 6\" x15 B/L fwd/lat x10 4\" 6in x15 B/L 6\" x15 fwd  6\" lat x10 B/L 6in x15 6in x15   Side stepping         STS 2x10 High blue mat x20 Chair no UE support x8 Low mat x20                                Gait Training                           Modalities                               Reviewed patient scheduling and HEP.          "

## 2024-11-27 ENCOUNTER — OFFICE VISIT (OUTPATIENT)
Dept: PHYSICAL THERAPY | Facility: CLINIC | Age: 49
End: 2024-11-27
Payer: COMMERCIAL

## 2024-11-27 DIAGNOSIS — M17.0 PRIMARY OSTEOARTHRITIS OF BOTH KNEES: Primary | ICD-10-CM

## 2024-11-27 DIAGNOSIS — G89.29 BILATERAL CHRONIC KNEE PAIN: ICD-10-CM

## 2024-11-27 DIAGNOSIS — M25.561 BILATERAL CHRONIC KNEE PAIN: ICD-10-CM

## 2024-11-27 DIAGNOSIS — M25.562 BILATERAL CHRONIC KNEE PAIN: ICD-10-CM

## 2024-11-27 PROCEDURE — 97112 NEUROMUSCULAR REEDUCATION: CPT | Performed by: PHYSICAL THERAPIST

## 2024-11-27 PROCEDURE — 97110 THERAPEUTIC EXERCISES: CPT | Performed by: PHYSICAL THERAPIST

## 2024-11-27 NOTE — PROGRESS NOTES
"Daily Note     Today's date: 2024  Patient name: Iris Curtis  : 1975  MRN: [id number]  Referring provider: Thomas Torres PA-C  Dx:   Encounter Diagnosis     ICD-10-CM    1. Primary osteoarthritis of both knees  M17.0       2. Bilateral chronic knee pain  M25.561     M25.562     G89.29           Start Time: 08  Stop Time: 902  Total time in clinic (min): 42 minutes    Subjective: Patient states she has been doing a lot of standing getting ready for Thanksgiving, baking and prepping causing increased bilateral knee pain. Episode yesterday after she was sitting taking a rest break where she stood from couch and had sharp pain in R knee causing her to fall backwards. Today pain level 6/10 with pain at anterior jt line in R and lateral patella on L.       Objective: See treatment diary below      Assessment: Tolerated treatment fair. C/o locking of L knee post bridge exercise while attempting to straighten knee. Shortened session today due to patient c/o pain and fatigue. Patient exhibited good technique with therapeutic exercises and would benefit from continued PT      Plan: Continue per plan of care.  Progress treatment as tolerated.       Precautions: none noted  POC: 25  Re-Eval:   Access Code: [email or web address]   Manuals    Knee mobs         Knee PROM      SA performed 8 min                     Neuro Re-Ed         LAQ w pause #4 2x15 4# 2x15 4# 2x15 4# 2x15 4# 2x15 #4 2x10 BL   Airex SL balance         bridge 2x15 2x15 2x10 Blue band 2x10 2x10 deferred band 2x15   TKE 15x BL Green x10 B/L  GRN x10 B/L     Lunge stretch :05x15 6\" :05x15 :15x5 At steps x15 B/L  :15x5   Quad set         SLR           Ther Ex         Nu-step (cardio endurance) 7 min lv 3 L3-1 7 min 7 min lv3 7 min L3 7 min L3 6 min lv 2   Hip flexor stretch w strap         Leg press S=6 #115 2x15 95# 2x15 #95  2x10 102.5#  2x10 102.5#  2x10 #102.5 2x10   Standing hip abd/ext SL abd  #2 x15 " "BL  Prone   Prone ext  R AAROM x10  L x10 SL abd x15 B/L    Prone x10 AROM B/L Abd x15 BL ABD 2x10  Stand EXT x15 B/L ABD 2x10  Stand EXT R 30x L 20x    Seated Hamstring stretch    Reviewed for HEP     Seated march w ankle weight         clamshell SLY sidelying x10 Blue x10 B/L Blue x10 B/L Blue x15 B/L Blue 2x15 B/L SLY sidelying x10            Ther Activity         Step ups/downs 8in x15 6\" x15 B/L fwd/lat x10 4\" 6in x15 B/L 6\" x15 fwd  6\" lat x10 B/L  6in x15   Side stepping         STS 2x10 High blue mat x20 Chair no UE support x8 Low mat x20 Low mat x20                               Gait Training                           Modalities                               Reviewed patient scheduling and HEP.            "

## 2024-11-29 ENCOUNTER — OFFICE VISIT (OUTPATIENT)
Dept: PHYSICAL THERAPY | Facility: CLINIC | Age: 49
End: 2024-11-29
Payer: COMMERCIAL

## 2024-11-29 DIAGNOSIS — G89.29 BILATERAL CHRONIC KNEE PAIN: ICD-10-CM

## 2024-11-29 DIAGNOSIS — M25.562 BILATERAL CHRONIC KNEE PAIN: ICD-10-CM

## 2024-11-29 DIAGNOSIS — M17.0 PRIMARY OSTEOARTHRITIS OF BOTH KNEES: Primary | ICD-10-CM

## 2024-11-29 DIAGNOSIS — M25.561 BILATERAL CHRONIC KNEE PAIN: ICD-10-CM

## 2024-11-29 PROCEDURE — 97530 THERAPEUTIC ACTIVITIES: CPT

## 2024-11-29 PROCEDURE — 97112 NEUROMUSCULAR REEDUCATION: CPT

## 2024-11-29 PROCEDURE — 97110 THERAPEUTIC EXERCISES: CPT

## 2024-11-29 NOTE — PROGRESS NOTES
"Daily Note     Today's date: 2024  Patient name: Iris Curtis  : 1975  MRN: [id number]  Referring provider: Thomas Torres PA-C  Dx:   Encounter Diagnosis     ICD-10-CM    1. Primary osteoarthritis of both knees  M17.0       2. Bilateral chronic knee pain  M25.561     M25.562     G89.29           Start Time: 08  Stop Time: 930  Total time in clinic (min): 52 minutes    Subjective: Patient reports that she is in a lot of pain from standing to long from cooking/baking for the holiday.      Objective: See treatment diary below      Assessment: Tolerated treatment well.   Patient participated in skilled PT session focused on strengthening, stretching, and ROM.  Patient able to complete exercise program with no issues.  Patient able to complete all exercises this session, but experiences a mild increase in pain.  Reviewed with patient about breaking up chores to help decreased some pain.  Patient would continue to benefit from skilled PT interventions to address strengthening, stretching, and ROM. Patient demonstrated fatigue post treatment      Plan: Continue per plan of care.      Precautions: none noted  POC: 25  Re-Eval:   Access Code: [email or web address]   Manuals    Knee mobs         Knee PROM                           Neuro Re-Ed         LAQ w pause #4 2x15 4# 2x15 4# 2x15 4# 2x15 4# 2x15 4# B/L 2x15   Airex SL balance         bridge 2x15 2x15 2x10 Blue band 2x10 2x10 deferred band W/Blue TB 2x10   TKE 15x BL Green x10 B/L  GRN x10 B/L  GTB 10x B/L   Lunge stretch :05x15 6\" :05x15 :15x5 At steps x15 B/L  At stairs 15x B/L   Quad set         SLR           Ther Ex         Nu-step (cardio endurance) 7 min lv 3 L3-1 7 min 7 min lv3 7 min L3 7 min L3 7 min L3   Hip flexor stretch w strap         Leg press S=6 #115 2x15 95# 2x15 #95  2x10 102.5#  2x10 102.5#  2x10 102.5#   2x10   Standing hip abd/ext SL abd  #2 x15 BL  Prone   Prone ext  R AAROM x10  L x10 SL abd " "x15 B/L    Prone x10 AROM B/L Abd x15 BL ABD 2x10  Stand EXT x15 B/L ABD 2x10  Stand EXT R 30x L 20x Standing Abd 2x10  Ext 2x10   Seated Hamstring stretch    Reviewed for HEP     Seated march w ankle weight         clamshell SLY sidelying x10 Blue x10 B/L Blue x10 B/L Blue x15 B/L Blue 2x15 B/L Blue TB B/L 2x15            Ther Activity         Step ups/downs 8in x15 6\" x15 B/L fwd/lat x10 4\" 6in x15 B/L 6\" x15 fwd  6\" lat x10 B/L  6\" step @ stairs 15x Fwd  Lat x 10 ea   Side stepping         STS 2x10 High blue mat x20 Chair no UE support x8 Low mat x20 Low mat x20 Low mat 20x                              Gait Training                           Modalities                               Reviewed patient scheduling and HEP.              "

## 2024-12-03 ENCOUNTER — OFFICE VISIT (OUTPATIENT)
Dept: PHYSICAL THERAPY | Facility: CLINIC | Age: 49
End: 2024-12-03
Payer: COMMERCIAL

## 2024-12-03 DIAGNOSIS — M17.0 PRIMARY OSTEOARTHRITIS OF BOTH KNEES: Primary | ICD-10-CM

## 2024-12-03 DIAGNOSIS — M25.561 BILATERAL CHRONIC KNEE PAIN: ICD-10-CM

## 2024-12-03 DIAGNOSIS — M25.562 BILATERAL CHRONIC KNEE PAIN: ICD-10-CM

## 2024-12-03 DIAGNOSIS — G89.29 BILATERAL CHRONIC KNEE PAIN: ICD-10-CM

## 2024-12-03 PROCEDURE — 97112 NEUROMUSCULAR REEDUCATION: CPT

## 2024-12-03 PROCEDURE — 97530 THERAPEUTIC ACTIVITIES: CPT

## 2024-12-03 PROCEDURE — 97110 THERAPEUTIC EXERCISES: CPT

## 2024-12-03 NOTE — PROGRESS NOTES
"Daily Note     Today's date: 12/3/2024  Patient name: Iris Curtis  : 1975  MRN: [id number]  Referring provider: Thomas Torres PA-C  Dx:   Encounter Diagnosis     ICD-10-CM    1. Primary osteoarthritis of both knees  M17.0       2. Bilateral chronic knee pain  M25.561     M25.562     G89.29           Start Time: 0848  Stop Time: 09  Total time in clinic (min): 42 minutes    Subjective: Patient reports she has been in some pain about a 5/10 today in both knees. Was at a concert last night sitting for a few hours without moving.      Objective: See treatment diary below      Assessment: Tolerated treatment well. Patient demonstrated fatigue post treatment and would benefit from continued PT. Patient able to complete all exercises and reports fatigue at end of session but was able to get through without exacerbation of symptoms. Instructed patient to continue with lunge stretches at home since we were unable to get to them during session today. Discussed possible gel injections with ortho. Patient to benefit from continued skilled interventions to address impairments.      Plan: Continue per plan of care.  Progress treatment as tolerated.       Precautions: none noted  POC: 25  Re-Eval:   Access Code: [email or web address]   Manuals 12/3 11/14 11/19 11/21 11/27 11/29   Knee mobs         Knee PROM                           Neuro Re-Ed         LAQ w pause #4 2x15 4# 2x15 4# 2x15 4# 2x15 4# 2x15 4# B/L 2x15   Airex SL balance         bridge W/Blue TB 2x10 2x15 2x10 Blue band 2x10 2x10 deferred band W/Blue TB 2x10   TKE  Green x10 B/L  GRN x10 B/L  GTB 10x B/L   Lunge stretch  6\" :05x15 :15x5 At steps x15 B/L  At stairs 15x B/L   Quad set         SLR           Ther Ex         Nu-step (cardio endurance) 7 min lv 3 L3-1 7 min 7 min lv3 7 min L3 7 min L3 7 min L3   Hip flexor stretch w strap         Leg press S=6 #115 2x15 95# 2x15 #95  2x10 102.5#  2x10 102.5#  2x10 102.5#   2x10   Standing hip abd/ext " "Standing Abd 2x10  Ext 2x10 SL abd x15 B/L    Prone x10 AROM B/L Abd x15 BL ABD 2x10  Stand EXT x15 B/L ABD 2x10  Stand EXT R 30x L 20x Standing Abd 2x10  Ext 2x10   Seated Hamstring stretch    Reviewed for HEP     Seated march w ankle weight         clamshell Blue TB B/L 2x15 Blue x10 B/L Blue x10 B/L Blue x15 B/L Blue 2x15 B/L Blue TB B/L 2x15            Ther Activity         Step ups/downs 8in x15 6\" x15 B/L fwd/lat x10 4\" 6in x15 B/L 6\" x15 fwd  6\" lat x10 B/L  6\" step @ stairs 15x Fwd  Lat x 10 ea   Side stepping         STS 2x10 High blue mat x20 Chair no UE support x8 Low mat x20 Low mat x20 Low mat 20x                              Gait Training                           Modalities                               Reviewed patient scheduling and HEP.                " normal...

## 2024-12-05 ENCOUNTER — OFFICE VISIT (OUTPATIENT)
Dept: PHYSICAL THERAPY | Facility: CLINIC | Age: 49
End: 2024-12-05
Payer: COMMERCIAL

## 2024-12-05 DIAGNOSIS — G89.29 BILATERAL CHRONIC KNEE PAIN: ICD-10-CM

## 2024-12-05 DIAGNOSIS — M17.0 PRIMARY OSTEOARTHRITIS OF BOTH KNEES: Primary | ICD-10-CM

## 2024-12-05 DIAGNOSIS — M25.561 BILATERAL CHRONIC KNEE PAIN: ICD-10-CM

## 2024-12-05 DIAGNOSIS — M25.562 BILATERAL CHRONIC KNEE PAIN: ICD-10-CM

## 2024-12-05 PROCEDURE — 97110 THERAPEUTIC EXERCISES: CPT

## 2024-12-05 PROCEDURE — 97112 NEUROMUSCULAR REEDUCATION: CPT

## 2024-12-05 PROCEDURE — 97530 THERAPEUTIC ACTIVITIES: CPT

## 2024-12-05 NOTE — PROGRESS NOTES
Daily Note     Today's date: 2024  Patient name: Iris Curtis  : 1975  MRN: [id number]  Referring provider: Thomas Torres PA-C  Dx:   Encounter Diagnosis     ICD-10-CM    1. Primary osteoarthritis of both knees  M17.0       2. Bilateral chronic knee pain  M25.561     M25.562     G89.29           Start Time: 0954          Subjective: Patient states she has a 4/10 pain in right knee joint line and left lateral knee pain today. She would like to continue with therapy after next assessment to get stronger.      Objective: See treatment diary below      Assessment: Tolerated treatment well. Patient would benefit from continued PT for stretching and strengthening. She performed her exercises with few verbal cues for proper performance of exercises. She felt a little more sore (5/10) in both knees by the end of the session.      Plan: Continue per plan of care.  Progress treatment as tolerated.       Precautions: none noted  POC: 25  Re-Eval:   Access Code: [email or web address]   Manuals 12/3 12/5 11/19 11/21 11/27 11/29   Knee mobs         Knee PROM                           Neuro Re-Ed         LAQ w pause #4 2x15 4# 2x15 4# 2x15 4# 2x15 4# 2x15 4# B/L 2x15   Airex SL balance         bridge W/Blue TB 2x10 W/Blue TB 2x10 2x10 Blue band 2x10 2x10 deferred band W/Blue TB 2x10   TKE    GRN x10 B/L  GTB 10x B/L   Lunge stretch   :15x5 At steps x15 B/L  At stairs 15x B/L   Quad set         SLR           Ther Ex         Nu-step (cardio endurance) 7 min lv 3 S=8 L3 x7 min 7 min lv3 7 min L3 7 min L3 7 min L3   Hip flexor stretch w strap         Leg press S=6 #115 2x15 m #95  2x10 102.5#  2x10 102.5#  2x10 102.5#   2x10   Standing hip abd/ext Standing Abd 2x10  Ext 2x10 Standing ABD 2x10  EXT 2x10 Abd x15 BL ABD 2x10  Stand EXT x15 B/L ABD 2x10  Stand EXT R 30x L 20x Standing Abd 2x10  Ext 2x10   Seated Hamstring stretch    Reviewed for HEP      w ankle weight         clamshell Blue TB B/L 2x15  "W/Blue TB 2x15 Blue x10 B/L Blue x15 B/L Blue 2x15 B/L Blue TB B/L 2x15            Ther Activity         Step ups/downs 8in x15 8 inch x15 6in x15 B/L 6\" x15 fwd  6\" lat x10 B/L  6\" step @ stairs 15x Fwd  Lat x 10 ea   Side stepping         STS 2x10 Low mat 2x10 Chair no UE support x8 Low mat x20 Low mat x20 Low mat 20x                              Gait Training                           Modalities                                                "

## 2024-12-10 ENCOUNTER — APPOINTMENT (OUTPATIENT)
Dept: PHYSICAL THERAPY | Facility: CLINIC | Age: 49
End: 2024-12-10
Payer: COMMERCIAL

## 2024-12-10 NOTE — PROGRESS NOTES
"Daily Note     Today's date: 12/10/2024  Patient name: Iris Curtis  : 1975  MRN: [id number]  Referring provider: Thomas Torres PA-C  Dx:   Encounter Diagnosis     ICD-10-CM    1. Primary osteoarthritis of both knees  M17.0       2. Bilateral chronic knee pain  M25.561     M25.562     G89.29                      Subjective: ***      Objective: See treatment diary below      Assessment: Tolerated treatment {Tolerated treatment :0041229060}. Patient {assessment:9554243068}      Plan: {PLAN:4063039893}     Precautions: none noted  POC: 25  Re-Eval:   Access Code: [email or web address]   Manuals 12/3 12/5 11/19 11/21 11/27 11/29   Knee mobs         Knee PROM                           Neuro Re-Ed         LAQ w pause #4 2x15 4# 2x15 4# 2x15 4# 2x15 4# 2x15 4# B/L 2x15   Airex SL balance         bridge W/Blue TB 2x10 W/Blue TB 2x10 2x10 Blue band 2x10 2x10 deferred band W/Blue TB 2x10   TKE    GRN x10 B/L  GTB 10x B/L   Lunge stretch   :15x5 At steps x15 B/L  At stairs 15x B/L   Quad set         SLR           Ther Ex         Nu-step (cardio endurance) 7 min lv 3 S=8 L3 x7 min 7 min lv3 7 min L3 7 min L3 7 min L3   Hip flexor stretch w strap         Leg press S=6 #115 2x15 m #95  2x10 102.5#  2x10 102.5#  2x10 102.5#   2x10   Standing hip abd/ext Standing Abd 2x10  Ext 2x10 Standing ABD 2x10  EXT 2x10 Abd x15 BL ABD 2x10  Stand EXT x15 B/L ABD 2x10  Stand EXT R 30x L 20x Standing Abd 2x10  Ext 2x10   Seated Hamstring stretch    Reviewed for HEP     Seated march w ankle weight         clamshell Blue TB B/L 2x15 W/Blue TB 2x15 Blue x10 B/L Blue x15 B/L Blue 2x15 B/L Blue TB B/L 2x15            Ther Activity         Step ups/downs 8in x15 8 inch x15 6in x15 B/L 6\" x15 fwd  6\" lat x10 B/L  6\" step @ stairs 15x Fwd  Lat x 10 ea   Side stepping         STS 2x10 Low mat 2x10 Chair no UE support x8 Low mat x20 Low mat x20 Low mat 20x                              Gait Training                           Modalities       "

## 2024-12-11 NOTE — PROGRESS NOTES
PT Re-Evaluation     Today's date: 2024  Patient name: Iris Curtis  : 1975  MRN: [id number]  Referring provider: Thomas Torres PA-C  Dx:   No diagnosis found.                   Assessment  Impairments: abnormal coordination, abnormal gait, abnormal or restricted ROM, activity intolerance, impaired balance, impaired physical strength, pain with function, poor body mechanics, participation limitations and activity limitations  Functional limitations: difficulty standing, walking and stairs  Symptom irritability: moderate    Assessment details: Iris Curtis is a 49 y.o. female presents to physical therapy with a history of chronic bilateral knee pain secondary to knee OA. Patient has attended initial eval and 8 f/u sessions demonstrating improvements in gross LE strength and improved bilateral knee AROM. Patient still experiencing residual pain especially in WBing due to significant OA making walking and stairs difficult. Patient has met a STG and progressing towards other STGs and LTGs, and due to multifactorial medical history, participation restrictions, and functional limitation (FOTO 41% function), the patient will benefit from continued skilled PT interventions to address aforementioned impairments to restore PLOF and attain desired goals.    Understanding of Dx/Px/POC: good     Prognosis: good  Prognosis details: Patient is pleasant and motivated to improve function. Demonstrates verbal understanding of POC and able to perform HEP.     Goals  STG (6 weeks)    STG: Pt to improve pain from 8/10 to 5/10 at its worst. PROGRESSING    STG: Pt to improve bilateral knee ext MMT to at least 4/5 to help with reciprocal gait pattern on stairs. MET    STG: Pt to improve 30 sec STS to at least 10 reps without UE support to improve LE strength and reduce risk for falls. PROGRESSING    LTG (12 weeks)    LTG: Pt to be able to perform light activities around the house with little difficulty to improve  independence with ADL's. PROGRESSING    LTG: Pt to be able to walk a mile with moderate difficulty to be able to return to walking routine with less discomfort. PROGRESSING    LTG: Pt to be independent with HEP to continue to increase strength and mobility to improve overall function. PROGRESSING    Plan  Patient would benefit from: skilled physical therapy  Planned modality interventions: cryotherapy, thermotherapy: hydrocollator packs, unattended electrical stimulation and neuromuscular electric stimulation    Planned therapy interventions: therapeutic activities, therapeutic exercise, gait training, manual therapy and neuromuscular re-education    Frequency: 2-3x week  Duration in weeks: 12  Plan of Care beginning date: 10/22/2024  Plan of Care expiration date: 1/22/2025  Treatment plan discussed with: patient  Plan details: Discussed POC with patient, patient demonstrates understanding.      Subjective Evaluation    History of Present Illness  Mechanism of injury: SUBJECTIVE 11/18/24 Iris Curtis is a 49 y.o. female presents to OPPT with complaints of progressive worsening bilateral knee pain R>L. Since starting patient reports that her endurance has improved and able to get up the stairs a little easier. Still having difficulty with standing activities due to bilateral knee OA.     INJURY HISTORY 10/22/24: Iris Curtis is a 49 y.o. female presents to OPPT with complaints of progressive worsening bilateral knee pain R>L. Right knee has been bothering her for a few years while the L knee has started hurting more in the last 2-3 months and has been clicking, grinding and locking. L knee has difficulty fully extending, and R knee has more difficulty bending. Unable to walk more than a block without assistance due to knee pain and weakness.  Patient Goals  Patient goals for therapy: increased strength, independence with ADLs/IADLs, decreased pain, increased motion and improved balance  Patient goal: wants  to be able to hike and go for walks  Pain  Current pain ratin  At best pain rating: 3  At worst pain ratin  Location: BL knees  Quality: sharp and dull ache    Social Support  Steps to enter house: yes  2  Stairs in house: yes   20      Diagnostic Tests  X-ray: abnormal    FCE comments: Bilateral knee OA    Objective     Active Range of Motion   Left Knee   Flexion: 110 degrees with pain  Extension: -5 degrees with pain    Right Knee   Flexion: 110 degrees with pain  Extension: 0 degrees with pain    Mobility   Patellar Mobility:   Left Knee   Hypomobile: left medial, left lateral, left superior and left inferior    Right Knee   Hypomobile: medial, lateral, superior and inferior     Strength/Myotome Testing     Left Hip   Planes of Motion   Flexion: 4  Abduction: 4  Adduction: 4  External rotation: 4  Internal rotation: 5    Right Hip   Planes of Motion   Flexion: 4  Abduction: 4  Adduction: 4  External rotation: 5  Internal rotation: 5    Left Knee   Flexion: 4  Extension: 4    Right Knee   Flexion: 4  Extension: 4    Left Ankle/Foot   Dorsiflexion: 4    Right Ankle/Foot   Dorsiflexion: 4    Tests     Additional Tests Details  30 sec STS: 8 reps    TUG: 10 sec    SL balance R: 16 sec  SL balance L: 30 sec    Ambulation     Ambulation: Stairs   Ascend stairs: independent  Pattern: non-reciprocal  Railings: two rails  Descend stairs: independent  Pattern: non-reciprocal  Railings: two rails         Precautions: none noted  POC: 25  Re-Eval: 24  Access Code: [email or web address]   Manuals 12/3 12/5 11/19 11/21 11/27 11/29   Knee mobs         Knee PROM                           Neuro Re-Ed         LAQ w pause #4 2x15 4# 2x15 4# 2x15 4# 2x15 4# 2x15 4# B/L 2x15   Airex SL balance         bridge W/Blue TB 2x10 W/Blue TB 2x10 2x10 Blue band 2x10 2x10 deferred band W/Blue TB 2x10   TKE    GRN x10 B/L  GTB 10x B/L   Lunge stretch   :15x5 At steps x15 B/L  At stairs 15x B/L   Quad set         SLR           Ther Ex      "    Nu-step (cardio endurance) 7 min lv 3 S=8 L3 x7 min 7 min lv3 7 min L3 7 min L3 7 min L3   Hip flexor stretch w strap         Leg press S=6 #115 2x15 m #95  2x10 102.5#  2x10 102.5#  2x10 102.5#   2x10   Standing hip abd/ext Standing Abd 2x10  Ext 2x10 Standing ABD 2x10  EXT 2x10 Abd x15 BL ABD 2x10  Stand EXT x15 B/L ABD 2x10  Stand EXT R 30x L 20x Standing Abd 2x10  Ext 2x10   Seated Hamstring stretch    Reviewed for HEP     Seated march w ankle weight         clamshell Blue TB B/L 2x15 W/Blue TB 2x15 Blue x10 B/L Blue x15 B/L Blue 2x15 B/L Blue TB B/L 2x15            Ther Activity         Step ups/downs 8in x15 8 inch x15 6in x15 B/L 6\" x15 fwd  6\" lat x10 B/L  6\" step @ stairs 15x Fwd  Lat x 10 ea   Side stepping         STS 2x10 Low mat 2x10 Chair no UE support x8 Low mat x20 Low mat x20 Low mat 20x                              Gait Training                           Modalities                           Reviewed patient scheduling and HEP.      "

## 2024-12-12 ENCOUNTER — APPOINTMENT (OUTPATIENT)
Dept: PHYSICAL THERAPY | Facility: CLINIC | Age: 49
End: 2024-12-12
Payer: COMMERCIAL

## 2024-12-16 ENCOUNTER — HOSPITAL ENCOUNTER (INPATIENT)
Facility: HOSPITAL | Age: 49
LOS: 1 days | Discharge: HOME | DRG: 871 | End: 2024-12-19
Attending: EMERGENCY MEDICINE | Admitting: HOSPITALIST
Payer: COMMERCIAL

## 2024-12-16 ENCOUNTER — APPOINTMENT (EMERGENCY)
Dept: CT IMAGING | Facility: HOSPITAL | Age: 49
DRG: 871 | End: 2024-12-16
Payer: COMMERCIAL

## 2024-12-16 ENCOUNTER — APPOINTMENT (EMERGENCY)
Dept: RADIOLOGY | Facility: HOSPITAL | Age: 49
DRG: 871 | End: 2024-12-16
Payer: COMMERCIAL

## 2024-12-16 DIAGNOSIS — R06.00 DYSPNEA: Primary | ICD-10-CM

## 2024-12-16 DIAGNOSIS — J18.9 SEPSIS DUE TO PNEUMONIA (HCC): ICD-10-CM

## 2024-12-16 DIAGNOSIS — A41.9 SEPSIS DUE TO PNEUMONIA (HCC): ICD-10-CM

## 2024-12-16 DIAGNOSIS — R09.02 HYPOXIA: ICD-10-CM

## 2024-12-16 DIAGNOSIS — J45.40 MODERATE PERSISTENT ASTHMA WITHOUT COMPLICATION: Chronic | ICD-10-CM

## 2024-12-16 DIAGNOSIS — J10.1 INFLUENZA A: ICD-10-CM

## 2024-12-16 DIAGNOSIS — E87.6 HYPOKALEMIA: ICD-10-CM

## 2024-12-16 LAB
ALBUMIN SERPL BCG-MCNC: 3.7 G/DL (ref 3.5–5)
ALP SERPL-CCNC: 79 U/L (ref 34–104)
ALT SERPL W P-5'-P-CCNC: 80 U/L (ref 7–52)
ANION GAP SERPL CALCULATED.3IONS-SCNC: 11 MMOL/L (ref 4–13)
ANISOCYTOSIS BLD QL SMEAR: PRESENT
APTT PPP: 36 SECONDS (ref 23–34)
AST SERPL W P-5'-P-CCNC: 28 U/L (ref 13–39)
BASOPHILS # BLD MANUAL: 0 THOUSAND/UL (ref 0–0.1)
BASOPHILS NFR MAR MANUAL: 0 % (ref 0–1)
BILIRUB SERPL-MCNC: 0.53 MG/DL (ref 0.2–1)
BNP SERPL-MCNC: 60 PG/ML (ref 0–100)
BUN SERPL-MCNC: 6 MG/DL (ref 5–25)
CALCIUM SERPL-MCNC: 9 MG/DL (ref 8.4–10.2)
CARDIAC TROPONIN I PNL SERPL HS: <2 NG/L (ref ?–50)
CHLORIDE SERPL-SCNC: 97 MMOL/L (ref 96–108)
CO2 SERPL-SCNC: 28 MMOL/L (ref 21–32)
CREAT SERPL-MCNC: 0.54 MG/DL (ref 0.6–1.3)
D DIMER PPP FEU-MCNC: 0.81 UG/ML FEU
EOSINOPHIL # BLD MANUAL: 0.1 THOUSAND/UL (ref 0–0.4)
EOSINOPHIL NFR BLD MANUAL: 1 % (ref 0–6)
ERYTHROCYTE [DISTWIDTH] IN BLOOD BY AUTOMATED COUNT: 17 % (ref 11.6–15.1)
FLUAV RNA RESP QL NAA+PROBE: POSITIVE
FLUBV RNA RESP QL NAA+PROBE: NEGATIVE
GFR SERPL CREATININE-BSD FRML MDRD: 112 ML/MIN/1.73SQ M
GLUCOSE SERPL-MCNC: 136 MG/DL (ref 65–140)
HCT VFR BLD AUTO: 37.9 % (ref 34.8–46.1)
HGB BLD-MCNC: 11.4 G/DL (ref 11.5–15.4)
INR PPP: 1.04 (ref 0.85–1.19)
LACTATE SERPL-SCNC: 1.3 MMOL/L (ref 0.5–2)
LG PLATELETS BLD QL SMEAR: PRESENT
LYMPHOCYTES # BLD AUTO: 18 % (ref 14–44)
LYMPHOCYTES # BLD AUTO: 2.18 THOUSAND/UL (ref 0.6–4.47)
MCH RBC QN AUTO: 22.6 PG (ref 26.8–34.3)
MCHC RBC AUTO-ENTMCNC: 30.1 G/DL (ref 31.4–37.4)
MCV RBC AUTO: 75 FL (ref 82–98)
MICROCYTES BLD QL AUTO: PRESENT
MONOCYTES # BLD AUTO: 0.73 THOUSAND/UL (ref 0–1.22)
MONOCYTES NFR BLD: 7 % (ref 4–12)
NEUTROPHILS # BLD MANUAL: 7.38 THOUSAND/UL (ref 1.85–7.62)
NEUTS BAND NFR BLD MANUAL: 2 % (ref 0–8)
NEUTS SEG NFR BLD AUTO: 69 % (ref 43–75)
PLATELET # BLD AUTO: 376 THOUSANDS/UL (ref 149–390)
PLATELET BLD QL SMEAR: ADEQUATE
PMV BLD AUTO: 8.9 FL (ref 8.9–12.7)
POTASSIUM SERPL-SCNC: 2.9 MMOL/L (ref 3.5–5.3)
PROCALCITONIN SERPL-MCNC: 0.08 NG/ML
PROT SERPL-MCNC: 7.5 G/DL (ref 6.4–8.4)
PROTHROMBIN TIME: 14.1 SECONDS (ref 12.3–15)
RBC # BLD AUTO: 5.05 MILLION/UL (ref 3.81–5.12)
RBC MORPH BLD: PRESENT
RSV RNA RESP QL NAA+PROBE: NEGATIVE
SARS-COV-2 RNA RESP QL NAA+PROBE: NEGATIVE
SODIUM SERPL-SCNC: 136 MMOL/L (ref 135–147)
TOXIC GRANULES BLD QL SMEAR: PRESENT
VARIANT LYMPHS # BLD AUTO: 3 %
WBC # BLD AUTO: 10.4 THOUSAND/UL (ref 4.31–10.16)

## 2024-12-16 PROCEDURE — 71275 CT ANGIOGRAPHY CHEST: CPT

## 2024-12-16 PROCEDURE — 84484 ASSAY OF TROPONIN QUANT: CPT | Performed by: EMERGENCY MEDICINE

## 2024-12-16 PROCEDURE — 36415 COLL VENOUS BLD VENIPUNCTURE: CPT | Performed by: EMERGENCY MEDICINE

## 2024-12-16 PROCEDURE — 83880 ASSAY OF NATRIURETIC PEPTIDE: CPT | Performed by: EMERGENCY MEDICINE

## 2024-12-16 PROCEDURE — 0241U HB NFCT DS VIR RESP RNA 4 TRGT: CPT | Performed by: EMERGENCY MEDICINE

## 2024-12-16 PROCEDURE — 99291 CRITICAL CARE FIRST HOUR: CPT | Performed by: EMERGENCY MEDICINE

## 2024-12-16 PROCEDURE — 99285 EMERGENCY DEPT VISIT HI MDM: CPT

## 2024-12-16 PROCEDURE — 87040 BLOOD CULTURE FOR BACTERIA: CPT | Performed by: EMERGENCY MEDICINE

## 2024-12-16 PROCEDURE — 80053 COMPREHEN METABOLIC PANEL: CPT | Performed by: EMERGENCY MEDICINE

## 2024-12-16 PROCEDURE — 85730 THROMBOPLASTIN TIME PARTIAL: CPT | Performed by: EMERGENCY MEDICINE

## 2024-12-16 PROCEDURE — 94760 N-INVAS EAR/PLS OXIMETRY 1: CPT

## 2024-12-16 PROCEDURE — 96366 THER/PROPH/DIAG IV INF ADDON: CPT

## 2024-12-16 PROCEDURE — 84145 PROCALCITONIN (PCT): CPT | Performed by: EMERGENCY MEDICINE

## 2024-12-16 PROCEDURE — 96365 THER/PROPH/DIAG IV INF INIT: CPT

## 2024-12-16 PROCEDURE — 93005 ELECTROCARDIOGRAM TRACING: CPT

## 2024-12-16 PROCEDURE — 71045 X-RAY EXAM CHEST 1 VIEW: CPT

## 2024-12-16 PROCEDURE — 85379 FIBRIN DEGRADATION QUANT: CPT | Performed by: EMERGENCY MEDICINE

## 2024-12-16 PROCEDURE — 83605 ASSAY OF LACTIC ACID: CPT | Performed by: EMERGENCY MEDICINE

## 2024-12-16 PROCEDURE — 85610 PROTHROMBIN TIME: CPT | Performed by: EMERGENCY MEDICINE

## 2024-12-16 PROCEDURE — 85007 BL SMEAR W/DIFF WBC COUNT: CPT | Performed by: EMERGENCY MEDICINE

## 2024-12-16 PROCEDURE — NC001 PR NO CHARGE: Performed by: INTERNAL MEDICINE

## 2024-12-16 PROCEDURE — 85027 COMPLETE CBC AUTOMATED: CPT | Performed by: EMERGENCY MEDICINE

## 2024-12-16 RX ORDER — POTASSIUM CHLORIDE 14.9 MG/ML
20 INJECTION INTRAVENOUS ONCE
Status: COMPLETED | OUTPATIENT
Start: 2024-12-16 | End: 2024-12-16

## 2024-12-16 RX ORDER — HEPARIN SODIUM 5000 [USP'U]/ML
5000 INJECTION, SOLUTION INTRAVENOUS; SUBCUTANEOUS EVERY 8 HOURS SCHEDULED
Status: DISCONTINUED | OUTPATIENT
Start: 2024-12-16 | End: 2024-12-19 | Stop reason: HOSPADM

## 2024-12-16 RX ORDER — SODIUM CHLORIDE, SODIUM GLUCONATE, SODIUM ACETATE, POTASSIUM CHLORIDE, MAGNESIUM CHLORIDE, SODIUM PHOSPHATE, DIBASIC, AND POTASSIUM PHOSPHATE .53; .5; .37; .037; .03; .012; .00082 G/100ML; G/100ML; G/100ML; G/100ML; G/100ML; G/100ML; G/100ML
1000 INJECTION, SOLUTION INTRAVENOUS ONCE
Status: COMPLETED | OUTPATIENT
Start: 2024-12-16 | End: 2024-12-16

## 2024-12-16 RX ORDER — POTASSIUM CHLORIDE 1500 MG/1
40 TABLET, EXTENDED RELEASE ORAL ONCE
Status: COMPLETED | OUTPATIENT
Start: 2024-12-16 | End: 2024-12-16

## 2024-12-16 RX ORDER — INSULIN LISPRO 100 [IU]/ML
1-6 INJECTION, SOLUTION INTRAVENOUS; SUBCUTANEOUS
Status: DISCONTINUED | OUTPATIENT
Start: 2024-12-16 | End: 2024-12-19 | Stop reason: HOSPADM

## 2024-12-16 RX ORDER — HYDROCODONE BITARTRATE AND HOMATROPINE METHYLBROMIDE ORAL SOLUTION 5; 1.5 MG/5ML; MG/5ML
5 LIQUID ORAL EVERY 4 HOURS PRN
Refills: 0 | Status: DISCONTINUED | OUTPATIENT
Start: 2024-12-16 | End: 2024-12-19 | Stop reason: HOSPADM

## 2024-12-16 RX ORDER — ALBUTEROL SULFATE 90 UG/1
2 INHALANT RESPIRATORY (INHALATION) EVERY 4 HOURS PRN
Status: DISCONTINUED | OUTPATIENT
Start: 2024-12-16 | End: 2024-12-19 | Stop reason: HOSPADM

## 2024-12-16 RX ORDER — ONDANSETRON 2 MG/ML
4 INJECTION INTRAMUSCULAR; INTRAVENOUS EVERY 6 HOURS PRN
Status: DISCONTINUED | OUTPATIENT
Start: 2024-12-16 | End: 2024-12-19 | Stop reason: HOSPADM

## 2024-12-16 RX ORDER — LEVALBUTEROL INHALATION SOLUTION 1.25 MG/3ML
1.25 SOLUTION RESPIRATORY (INHALATION)
Status: DISCONTINUED | OUTPATIENT
Start: 2024-12-16 | End: 2024-12-19 | Stop reason: HOSPADM

## 2024-12-16 RX ORDER — INSULIN LISPRO 100 [IU]/ML
1-6 INJECTION, SOLUTION INTRAVENOUS; SUBCUTANEOUS
Status: DISCONTINUED | OUTPATIENT
Start: 2024-12-17 | End: 2024-12-19 | Stop reason: HOSPADM

## 2024-12-16 RX ORDER — METHYLPREDNISOLONE SODIUM SUCCINATE 40 MG/ML
40 INJECTION, POWDER, LYOPHILIZED, FOR SOLUTION INTRAMUSCULAR; INTRAVENOUS EVERY 6 HOURS SCHEDULED
Status: DISCONTINUED | OUTPATIENT
Start: 2024-12-17 | End: 2024-12-18

## 2024-12-16 RX ORDER — NICOTINE 21 MG/24HR
1 PATCH, TRANSDERMAL 24 HOURS TRANSDERMAL DAILY
Status: DISCONTINUED | OUTPATIENT
Start: 2024-12-16 | End: 2024-12-19 | Stop reason: HOSPADM

## 2024-12-16 RX ORDER — SODIUM CHLORIDE FOR INHALATION 0.9 %
3 VIAL, NEBULIZER (ML) INHALATION
Status: DISCONTINUED | OUTPATIENT
Start: 2024-12-16 | End: 2024-12-16

## 2024-12-16 RX ORDER — ACETAMINOPHEN 325 MG/1
650 TABLET ORAL EVERY 6 HOURS PRN
Status: DISCONTINUED | OUTPATIENT
Start: 2024-12-16 | End: 2024-12-19 | Stop reason: HOSPADM

## 2024-12-16 RX ADMIN — POTASSIUM CHLORIDE 20 MEQ: 14.9 INJECTION, SOLUTION INTRAVENOUS at 21:54

## 2024-12-16 RX ADMIN — NICOTINE 1 PATCH: 21 PATCH, EXTENDED RELEASE TRANSDERMAL at 23:59

## 2024-12-16 RX ADMIN — IOHEXOL 85 ML: 350 INJECTION, SOLUTION INTRAVENOUS at 20:14

## 2024-12-16 RX ADMIN — SODIUM CHLORIDE, SODIUM GLUCONATE, SODIUM ACETATE, POTASSIUM CHLORIDE, MAGNESIUM CHLORIDE, SODIUM PHOSPHATE, DIBASIC, AND POTASSIUM PHOSPHATE 1000 ML: .53; .5; .37; .037; .03; .012; .00082 INJECTION, SOLUTION INTRAVENOUS at 20:36

## 2024-12-16 RX ADMIN — AZITHROMYCIN MONOHYDRATE 500 MG: 500 INJECTION, POWDER, LYOPHILIZED, FOR SOLUTION INTRAVENOUS at 22:04

## 2024-12-16 RX ADMIN — METHYLPREDNISOLONE SODIUM SUCCINATE 40 MG: 40 INJECTION, POWDER, FOR SOLUTION INTRAMUSCULAR; INTRAVENOUS at 23:58

## 2024-12-16 RX ADMIN — HEPARIN SODIUM 5000 UNITS: 5000 INJECTION, SOLUTION INTRAVENOUS; SUBCUTANEOUS at 23:58

## 2024-12-16 RX ADMIN — SODIUM CHLORIDE, SODIUM GLUCONATE, SODIUM ACETATE, POTASSIUM CHLORIDE, MAGNESIUM CHLORIDE, SODIUM PHOSPHATE, DIBASIC, AND POTASSIUM PHOSPHATE 1000 ML: .53; .5; .37; .037; .03; .012; .00082 INJECTION, SOLUTION INTRAVENOUS at 19:32

## 2024-12-16 RX ADMIN — POTASSIUM CHLORIDE 40 MEQ: 1500 TABLET, EXTENDED RELEASE ORAL at 21:52

## 2024-12-16 NOTE — ED PROVIDER NOTES
"Time reflects when diagnosis was documented in both MDM as applicable and the Disposition within this note       Time User Action Codes Description Comment    12/16/2024  8:21 PM Ponce Antonino Add [R06.00] Dyspnea     12/16/2024  8:21 PM PonceAntonino Add [J10.1] Influenza A     12/16/2024  8:22 PM Ponce, Antonino Add [R09.02] Hypoxia     12/16/2024  9:38 PM Jose Roberto Aguilar [E87.6] Hypokalemia           ED Disposition       ED Disposition   Admit    Condition   Stable    Date/Time   Mon Dec 16, 2024  8:21 PM    Comment   Case was discussed with d and the patient's admission status was agreed to be Admission Status: observation status to the service of Dr. bianka Wesley               Assessment & Plan       Medical Decision Making    Patient signed out to Dr. Aguilar, pending CTA of chest to r/o PE, D-dimer could not be age adjusted, pt will require admission for hypoxia.    Portions of the record may have been created with voice recognition software. Occasional wrong word or \"sound a like\" substitutions may have occurred due to the inherent limitations of voice recognition software. Read the chart carefully and recognize, using context, where substitutions have occurred.     ED Course as of 12/17/24 1640   Mon Dec 16, 2024   1858 Patient seen, evaluated, examined, 1 week hx cough, congestion, wheezing noted, hx of asthma, O2 sat at home: 84 % a few days ago, no significant wheezing noted.  O2 sat: 87 % on room air, 2 liters of NC applied: 91 %.    Brief focused differential diagnosis in this patient is as follows cute asthma exacerbation versus pulmonary embolism with hypoxia and tachycardia versus flu versus RSV versus COVID vs. pneumonia.   1953 D-dimer is elevated cannot be age-adjusted, due to the heart rate and hypoxia proceed with a CT of the chest, patient was made aware of the need for this treatment modality.   2029 Signed out to Dr. Aguilar, CT imaging pending, patient is aware of the need for patient to " the hospital due to the hypoxia.       Medications   acetaminophen (TYLENOL) tablet 650 mg (has no administration in time range)   ondansetron (ZOFRAN) injection 4 mg (has no administration in time range)   nicotine (NICODERM CQ) 21 mg/24 hr TD 24 hr patch 1 patch (has no administration in time range)   HYDROcodone Bit-Homatrop MBr (HYCODAN) oral syrup 5 mL (has no administration in time range)   levalbuterol (XOPENEX) inhalation solution 1.25 mg (has no administration in time range)   methylPREDNISolone sodium succinate (Solu-MEDROL) injection 40 mg (has no administration in time range)   heparin (porcine) subcutaneous injection 5,000 Units (has no administration in time range)   insulin lispro (HumALOG/ADMELOG) 100 units/mL subcutaneous injection 1-6 Units (has no administration in time range)   insulin lispro (HumALOG/ADMELOG) 100 units/mL subcutaneous injection 1-6 Units (has no administration in time range)   multi-electrolyte (PLASMALYTE-A/ISOLYTE-S PH 7.4) IV solution 1,000 mL (0 mL Intravenous Stopped 12/16/24 2002)     Followed by   multi-electrolyte (PLASMALYTE-A/ISOLYTE-S PH 7.4) IV solution 1,000 mL (1,000 mL Intravenous New Bag 12/16/24 2036)   azithromycin (ZITHROMAX) 500 mg in sodium chloride 0.9 % 250 mL IVPB (500 mg Intravenous New Bag 12/16/24 2204)   iohexol (OMNIPAQUE) 350 MG/ML injection (MULTI-DOSE) 85 mL (85 mL Intravenous Given 12/16/24 2014)   potassium chloride (Klor-Con M20) CR tablet 40 mEq (40 mEq Oral Given 12/16/24 2152)   potassium chloride 20 mEq IVPB (premix) (20 mEq Intravenous New Bag 12/16/24 2154)       ED Risk Strat Scores                          SBIRT 22yo+      Flowsheet Row Most Recent Value   Initial Alcohol Screen: US AUDIT-C     1. How often do you have a drink containing alcohol? 0 Filed at: 12/16/2024 1955   2. How many drinks containing alcohol do you have on a typical day you are drinking?  0 Filed at: 12/16/2024 1955   Audit-C Score 0 Filed at: 12/16/2024 1955    HAILE: How many times in the past year have you...    Used an illegal drug or used a prescription medication for non-medical reasons? Never Filed at: 12/16/2024 1955                            History of Present Illness       Chief Complaint   Patient presents with    Shortness of Breath     Sob, cough, dry heaving, no appetite, over the last week; states oxygen was at 84% a couple of days ago but did not want to come in       Past Medical History:   Diagnosis Date    Allergic     Asthma     Diabetes mellitus (HCC)     Hypertension     Migraines       Past Surgical History:   Procedure Laterality Date    HERNIA REPAIR        Family History   Problem Relation Age of Onset    COPD Mother     Diabetes Mother     COPD Father     No Known Problems Sister     No Known Problems Maternal Grandmother     No Known Problems Paternal Grandmother     No Known Problems Maternal Aunt     No Known Problems Maternal Aunt     No Known Problems Paternal Aunt       Social History     Tobacco Use    Smoking status: Every Day     Current packs/day: 1.00     Types: Cigarettes    Smokeless tobacco: Current   Vaping Use    Vaping status: Never Used   Substance Use Topics    Alcohol use: Never    Drug use: Never      E-Cigarette/Vaping    E-Cigarette Use Never User       E-Cigarette/Vaping Substances    Nicotine No     THC No     CBD No     Flavoring No     Other No     Unknown No       I have reviewed and agree with the history as documented.     HPI      49-year-old female, mildly obese, history of asthma, presents with a 1 week history of cough, congestion, wheezing, no documented fever, lowest O2 saturation at home was 83% as per the patient who arrived via private vehicle as a reliable competent historian without any concerns for language barrier w/ above chief complaints, no recent travel inside the geographical area.    12 point review of systems is unremarkable, patient denies any abdominal pain except when coughing, no change in  bowel habits, no dysuria, back pain, rash, neck pain, calf pain  Review of Systems   Constitutional: Negative.  Negative for chills, diaphoresis, fatigue and fever.   HENT: Negative.  Negative for dental problem and facial swelling.    Eyes: Negative.  Negative for photophobia, pain and redness.   Respiratory:  Positive for cough, shortness of breath and wheezing. Negative for apnea, choking and chest tightness.    Cardiovascular: Negative.  Negative for chest pain, palpitations and leg swelling.   Gastrointestinal: Negative.    Endocrine: Negative.    Genitourinary: Negative.    Musculoskeletal: Negative.    Skin: Negative.    Allergic/Immunologic: Negative.    Neurological: Negative.    Hematological: Negative.    Psychiatric/Behavioral: Negative.             Objective       ED Triage Vitals   Temperature Pulse Blood Pressure Respirations SpO2 Patient Position - Orthostatic VS   12/16/24 1859 12/16/24 1858 12/16/24 1858 12/16/24 1858 12/16/24 1858 12/16/24 2045   98.4 °F (36.9 °C) 105 163/89 (!) 24 (!) 87 % Sitting      Temp Source Heart Rate Source BP Location FiO2 (%) Pain Score    12/17/24 0730 12/16/24 1858 12/16/24 2045 -- 12/17/24 0032    Oral Monitor Right arm  6      Vitals      Date and Time Temp Pulse SpO2 Resp BP Pain Score FACES Pain Rating User   12/17/24 1516 98.2 °F (36.8 °C) 97 90 % -- -- -- -- DII   12/17/24 1424 -- 98 92 % 20 -- -- -- ED   12/17/24 1424 -- -- -- -- 139/79 -- -- DII   12/17/24 1238 -- -- 89 % -- -- -- -- ED   12/17/24 1111 -- 106 90 % -- 139/75 -- -- DII   12/17/24 0900 -- -- 91 % -- -- -- -- ED   12/17/24 0853 -- -- -- -- -- 5 -- ED   12/17/24 0740 -- -- 92 % -- -- -- -- [facility]   12/17/24 0730 -- 100 -- -- -- -- --    12/17/24 0730 97.7 °F (36.5 °C) -- -- -- 136/90 -- -- DII   12/17/24 0032 -- -- -- -- -- 6 -- TL   12/16/24 2341 -- -- -- 20 -- -- -- ELR   12/16/24 2236 98.6 °F (37 °C) -- -- -- -- -- -- BY   12/16/24 2236 -- 105 90 % 18 155/88 -- -- DII   12/16/24 2045 -- 97 90 %  20 171/82 -- -- KB   12/16/24 1859 98.4 °F (36.9 °C) -- 91 % -- -- -- -- AF   12/16/24 1858 -- 105 87 % 24 163/89 -- -- AF            Physical Exam  Vitals and nursing note reviewed.   Constitutional:       General: She is not in acute distress.     Appearance: Normal appearance. She is normal weight. She is not ill-appearing, toxic-appearing or diaphoretic.   HENT:      Head: Normocephalic and atraumatic.      Right Ear: External ear normal.      Left Ear: External ear normal.      Nose: Nose normal.      Mouth/Throat:      Mouth: Mucous membranes are moist.      Pharynx: Oropharynx is clear.   Eyes:      Extraocular Movements: Extraocular movements intact.      Conjunctiva/sclera: Conjunctivae normal.      Pupils: Pupils are equal, round, and reactive to light.   Cardiovascular:      Rate and Rhythm: Normal rate and regular rhythm.      Pulses: Normal pulses.      Heart sounds: Normal heart sounds.   Pulmonary:      Effort: Pulmonary effort is normal. No respiratory distress.      Breath sounds: No stridor. Examination of the right-lower field reveals rhonchi. Examination of the left-lower field reveals rhonchi. Rhonchi present. No decreased breath sounds, wheezing or rales.   Chest:      Chest wall: No tenderness.   Abdominal:      General: Abdomen is flat. Bowel sounds are normal.      Palpations: Abdomen is soft.   Musculoskeletal:         General: Normal range of motion.      Cervical back: Normal range of motion.   Skin:     General: Skin is warm.      Capillary Refill: Capillary refill takes less than 2 seconds.      Coloration: Skin is not jaundiced or pale.   Neurological:      General: No focal deficit present.      Mental Status: She is alert and oriented to person, place, and time. Mental status is at baseline.   Psychiatric:         Mood and Affect: Mood normal.         Behavior: Behavior normal.         Thought Content: Thought content normal.         Judgment: Judgment normal.         Results  Reviewed       Procedure Component Value Units Date/Time    Blood culture #1 [[id number]] Collected: 12/16/24 1938    Lab Status: Preliminary result Specimen: Blood from Arm, Left Updated: 12/17/24 0901     Blood Culture Received in Microbiology Lab. Culture in Progress.    Blood culture #2 [[id number]] Collected: 12/16/24 1938    Lab Status: Preliminary result Specimen: Blood from Hand, Right Updated: 12/17/24 0901     Blood Culture Received in Microbiology Lab. Culture in Progress.    Basic metabolic panel [[id number]]  (Abnormal) Collected: 12/17/24 0645    Lab Status: Final result Specimen: Blood from Hand, Left Updated: 12/17/24 0717     Sodium 136 mmol/L      Potassium 3.7 mmol/L      Chloride 102 mmol/L      CO2 26 mmol/L      ANION GAP 8 mmol/L      BUN 6 mg/dL      Creatinine 0.45 mg/dL      Glucose 200 mg/dL      Calcium 8.4 mg/dL      eGFR 119 ml/min/1.73sq m     Narrative:      National Kidney Disease Foundation guidelines for Chronic Kidney Disease (CKD):     Stage 1 with normal or high GFR (GFR > 90 mL/min/1.73 square meters)    Stage 2 Mild CKD (GFR = 60-89 mL/min/1.73 square meters)    Stage 3A Moderate CKD (GFR = 45-59 mL/min/1.73 square meters)    Stage 3B Moderate CKD (GFR = 30-44 mL/min/1.73 square meters)    Stage 4 Severe CKD (GFR = 15-29 mL/min/1.73 square meters)    Stage 5 End Stage CKD (GFR <15 mL/min/1.73 square meters)  Note: GFR calculation is accurate only with a steady state creatinine    CBC (With Platelets) [[id number]]  (Abnormal) Collected: 12/17/24 0645    Lab Status: Final result Specimen: Blood from Hand, Left Updated: 12/17/24 0700     WBC 8.35 Thousand/uL      RBC 4.53 Million/uL      Hemoglobin 10.5 g/dL      Hematocrit 34.2 %      MCV 76 fL      MCH 23.2 pg      MCHC 30.7 g/dL      RDW 17.0 %      Platelets 342 Thousands/uL      MPV 9.0 fL     Lactic acid [[id number]]  (Normal) Collected: 12/16/24 1938    Lab Status: Final result Specimen: Blood from Arm, Left Updated: 12/16/24  2003     LACTIC ACID 1.3 mmol/L     Narrative:      Result may be elevated if tourniquet was used during collection.    RBC Morphology Reflex Test [[id number]] Collected: 12/16/24 1913    Lab Status: Final result Specimen: Blood from Arm, Left Updated: 12/16/24 2001    FLU/RSV/COVID - if FLU/RSV clinically relevant (2hr TAT) [[id number]]  (Abnormal) Collected: 12/16/24 1913    Lab Status: Final result Specimen: Nares from Nose Updated: 12/16/24 2001     SARS-CoV-2 Negative     INFLUENZA A PCR Positive     INFLUENZA B PCR Negative     RSV PCR Negative    Narrative:      This test has been performed using the CoV-2/Flu/RSV plus assay on the [employer] platform. This test has been validated by the  and verified by the performing laboratory.     This test is designed to amplify and detect the following: nucleocapsid (N), envelope (E), and RNA-dependent RNA polymerase (RdRP) genes of the SARS-CoV-2 genome; matrix (M), basic polymerase (PB2), and acidic protein (PA) segments of the influenza A genome; matrix (M) and non-structural protein (NS) segments of the influenza B genome, and the nucleocapsid genes of RSV A and RSV B.     Positive results are indicative of the presence of Flu A, Flu B, RSV, and/or SARS-CoV-2 RNA. Positive results for SARS-CoV-2 or suspected novel influenza should be reported to state, local, or federal health departments according to local reporting requirements.      All results should be assessed in conjunction with clinical presentation and other laboratory markers for clinical management.     FOR PEDIATRIC PATIENTS - copy/paste COVID Guidelines URL to browser: [email or web address]       Procalcitonin [[id number]]  (Normal) Collected: 12/16/24 1913    Lab Status: Final result Specimen: Blood from Arm, Left Updated: 12/16/24 1958     Procalcitonin 0.08 ng/ml     CBC and differential [[id number]]  (Abnormal) Collected: 12/16/24  1913    Lab Status: Final result Specimen: Blood from Arm, Left Updated: 12/16/24 1947     WBC 10.40 Thousand/uL      RBC 5.05 Million/uL      Hemoglobin 11.4 g/dL      Hematocrit 37.9 %      MCV 75 fL      MCH 22.6 pg      MCHC 30.1 g/dL      RDW 17.0 %      MPV 8.9 fL      Platelets 376 Thousands/uL     Narrative:      This is an appended report.  These results have been appended to a previously verified report.    Manual Differential(PHLEBS Do Not Order) [[id number]]  (Abnormal) Collected: 12/16/24 1913    Lab Status: Final result Specimen: Blood from Arm, Left Updated: 12/16/24 1947     Segmented % 69 %      Bands % 2 %      Lymphocytes % 18 %      Monocytes % 7 %      Eosinophils % 1 %      Basophils % 0 %      Atypical Lymphocytes % 3 %      Absolute Neutrophils 7.38 Thousand/uL      Absolute Lymphocytes 2.18 Thousand/uL      Absolute Monocytes 0.73 Thousand/uL      Absolute Eosinophils 0.10 Thousand/uL      Absolute Basophils 0.00 Thousand/uL      Total Counted --     Toxic Granulation Present     RBC Morphology Present     Platelet Estimate Adequate     Large Platelet Present     Anisocytosis Present     Microcytes Present    HS Troponin 0hr (reflex protocol) [[id number]]  (Normal) Collected: 12/16/24 1913    Lab Status: Final result Specimen: Blood from Arm, Left Updated: 12/16/24 1941     hs TnI 0hr <2 ng/L     B-Type Natriuretic Peptide(BNP) [[id number]]  (Normal) Collected: 12/16/24 1913    Lab Status: Final result Specimen: Blood from Arm, Left Updated: 12/16/24 1940     BNP 60 pg/mL     Comprehensive metabolic panel [[id number]]  (Abnormal) Collected: 12/16/24 1913    Lab Status: Final result Specimen: Blood from Arm, Left Updated: 12/16/24 1935     Sodium 136 mmol/L      Potassium 2.9 mmol/L      Chloride 97 mmol/L      CO2 28 mmol/L      ANION GAP 11 mmol/L      BUN 6 mg/dL      Creatinine 0.54 mg/dL      Glucose 136 mg/dL      Calcium 9.0 mg/dL      AST 28 U/L      ALT 80 U/L      Alkaline Phosphatase  79 U/L      Total Protein 7.5 g/dL      Albumin 3.7 g/dL      Total Bilirubin 0.53 mg/dL      eGFR 112 ml/min/1.73sq m     Narrative:      National Kidney Disease Foundation guidelines for Chronic Kidney Disease (CKD):     Stage 1 with normal or high GFR (GFR > 90 mL/min/1.73 square meters)    Stage 2 Mild CKD (GFR = 60-89 mL/min/1.73 square meters)    Stage 3A Moderate CKD (GFR = 45-59 mL/min/1.73 square meters)    Stage 3B Moderate CKD (GFR = 30-44 mL/min/1.73 square meters)    Stage 4 Severe CKD (GFR = 15-29 mL/min/1.73 square meters)    Stage 5 End Stage CKD (GFR <15 mL/min/1.73 square meters)  Note: GFR calculation is accurate only with a steady state creatinine    D-Dimer [[id number]]  (Abnormal) Collected: 12/16/24 1913    Lab Status: Final result Specimen: Blood from Arm, Left Updated: 12/16/24 1935     D-Dimer, Quant 0.81 ug/ml FEU     Protime-INR [[id number]]  (Normal) Collected: 12/16/24 1913    Lab Status: Final result Specimen: Blood from Arm, Left Updated: 12/16/24 1934     Protime 14.1 seconds      INR 1.04    Narrative:      INR Therapeutic Range    Indication                                             INR Range      Atrial Fibrillation                                               2.0-3.0  Hypercoagulable State                                    2.0.2.3  Left Ventricular Asist Device                            2.0-3.0  Mechanical Heart Valve                                  -    Aortic(with afib, MI, embolism, HF, LA enlargement,    and/or coagulopathy)                                     2.0-3.0 (2.5-3.5)     Mitral                                                             2.5-3.5  Prosthetic/Bioprosthetic Heart Valve               2.0-3.0  Venous thromboembolism (VTE: VT, PE        2.0-3.0    APTT [[id number]]  (Abnormal) Collected: 12/16/24 1913    Lab Status: Final result Specimen: Blood from Arm, Left Updated: 12/16/24 1934     PTT 36 seconds             CTA chest pe study   Final  Interpretation by Jin Noguera MD (12/16 2112)         1. Groundglass opacities throughout the lungs may represent acute pneumonitis or atypical pneumonia.   2. No evidence of acute pulmonary embolus.                  Workstation performed: [id number]         XR chest 1 view portable   ED Interpretation by Antonino Ponce III, DO (12/16 1949)   Significantly underpenetrated portable chest x-ray, possible PTA on R side.      Final Interpretation by Ming Loya MD (12/17 0601)      Lingular pneumonia and bilateral bronchiolitis.            Workstation performed: [id number]             ECG 12 Lead Documentation Only    Date/Time: 12/16/2024 6:56 PM    Performed by: Antonino Ponce III, DO  Authorized by: Antonino Ponce III, DO    Indications / Diagnosis:  SOB  ECG reviewed by me, the ED Provider: yes    Patient location:  ED  Comments:      I personally reviewed this EKG that was performed and the patient in 6:56 PM, interpreted by me at the same time, sinus tachycardia with no acute ST abnormalities.    No diffuse elevations to indicate pericarditis.  No coved ST elevations greater than 2mm with negative T waves in V1-3 to indicate concern for brugada.  No biphasic T waves in V2, V3 to indicate Wellens (critical stenosis of LAD).   No elevation in aVR or deviation when compared to V1 (can be associated with ST depression in I,II, V4-6 when left main occlusion is present).   CriticalCare Time    Date/Time: 12/16/2024 7:00 PM    Performed by: Antonino Ponce III, DO  Authorized by: Antonino Ponce III, DO    Critical care provider statement:     Critical care time (minutes):  35    Critical care start time:  12/16/2024 7:00 PM    Critical care end time:  12/16/2024 7:40 PM    Critical care time was exclusive of:  Separately billable procedures and treating other patients    Critical care was necessary to treat or prevent imminent or life-threatening deterioration of the  following conditions:  Respiratory failure    Critical care was time spent personally by me on the following activities:  Obtaining history from patient or surrogate, evaluation of patient's response to treatment, examination of patient, ordering and performing treatments and interventions, ordering and review of laboratory studies, ordering and review of radiographic studies, re-evaluation of patient's condition and review of old charts  Comments:      Patient by hx was hypoxic at 88-87 % required oxygen, work of breathing decreased after application of oxygen.      ED Medication and Procedure Management   Prior to Admission Medications   Prescriptions Last Dose Informant Patient Reported? Taking?   Alcaftadine (Lastacaft) 0.25 % SOLN Past Month  No Yes   Sig: Administer 1 drop to both eyes daily as needed (allergic eye symptoms)   Blood Glucose Monitoring Suppl (OneTouch Verio Reflect) w/Device KIT 12/17/2024 Morning  No Yes   Sig: Check blood sugars once daily. Please substitute with appropriate alternative as covered by patient's insurance. Dx: E11.65   Cholecalciferol (Vitamin D3) 25 MCG TABS Past Week  No Yes   Sig: TAKE 1 TABLET BY MOUTH EVERY DAY   Fluticasone-Salmeterol (Wixela Inhub) 250-50 mcg/dose inhaler 12/17/2024 Morning  No Yes   Sig: Inhale 1 puff 2 (two) times a day Rinse mouth after use.   OneTouch Delica Lancets 33G MISC 12/17/2024 Morning  No Yes   Sig: Check blood sugars once daily. Please substitute with appropriate alternative as covered by patient's insurance. Dx: E11.65   ZOLMitriptan (Zomig) 5 MG tablet More than a month  No No   Sig: Take 1 tablet at onset of headache. Max 2 tablets per week.   albuterol (2.5 mg/3 mL) 0.083 % nebulizer solution 12/17/2024 Morning  No Yes   Sig: Take 3 mL (2.5 mg total) by nebulization every 4 (four) hours as needed for wheezing   albuterol (PROVENTIL HFA,VENTOLIN HFA) 90 mcg/act inhaler 12/17/2024 Morning  No Yes   Sig: Inhale 2 puffs every 6 (six) hours  as needed for wheezing   amitriptyline (ELAVIL) 10 mg tablet Past Week  No Yes   Sig: Take 2 tablets (20 mg total) by mouth daily at bedtime   azelastine (OPTIVAR) 0.05 % ophthalmic solution Past Week  No Yes   Sig: INSTILL 1 DROP INTO BOTH EYES TWICE A DAY   busPIRone (BUSPAR) 5 mg tablet Past Week  No Yes   Sig: TAKE 1 TABLET BY MOUTH TWICE A DAY   cetirizine (ZyrTEC) 10 mg tablet Past Week  No Yes   Sig: Take 1 tablet (10 mg total) by mouth daily   fluticasone (FLONASE) 50 mcg/act nasal spray Past Week  No Yes   Sig: SPRAY 2 SPRAYS INTO EACH NOSTRIL EVERY DAY   glucose blood (OneTouch Verio) test strip 12/17/2024 Morning  No Yes   Sig: Check blood sugars once daily. Please substitute with appropriate alternative as covered by patient's insurance. Dx: E11.65   losartan (COZAAR) 25 mg tablet Past Week  No Yes   Sig: TAKE 1 TABLET (25 MG TOTAL) BY MOUTH DAILY.   meloxicam (Mobic) 15 mg tablet Past Week  No Yes   Sig: Take 1 tablet (15 mg total) by mouth daily   metFORMIN (GLUCOPHAGE-XR) 500 mg 24 hr tablet Past Week  No Yes   Sig: TAKE 2 TABLETS BY MOUTH DAILY WITH DINNER   metoprolol succinate (TOPROL-XL) 50 mg 24 hr tablet Past Week  No Yes   Sig: TAKE 1 TABLET BY MOUTH EVERY DAY   montelukast (SINGULAIR) 10 mg tablet Past Week  No Yes   Sig: Take 1 tablet (10 mg total) by mouth daily at bedtime   nystatin (MYCOSTATIN) powder Past Week  No Yes   Sig: Apply topically 3 (three) times a day   omeprazole (PriLOSEC) 20 mg delayed release capsule Past Week  No Yes   Sig: Take 1 capsule (20 mg total) by mouth daily   rosuvastatin (CRESTOR) 10 MG tablet Past Week  No Yes   Sig: TAKE 1 TABLET BY MOUTH EVERY DAY   tirzepatide (Mounjaro) 2.5 MG/0.5ML Past Week  No Yes   Sig: Inject 0.5 mL (2.5 mg total) under the skin every 7 days   topiramate (TOPAMAX) 25 mg tablet Past Week  No Yes   Sig: TAKE 3 TABLETS (75 MG TOTAL) BY MOUTH DAILY AT BEDTIME      Facility-Administered Medications: None     Current Discharge Medication  List        CONTINUE these medications which have NOT CHANGED    Details   albuterol (2.5 mg/3 mL) 0.083 % nebulizer solution Take 3 mL (2.5 mg total) by nebulization every 4 (four) hours as needed for wheezing  Qty: 75 mL, Refills: 1    Associated Diagnoses: Moderate persistent asthma without complication      albuterol (PROVENTIL HFA,VENTOLIN HFA) 90 mcg/act inhaler Inhale 2 puffs every 6 (six) hours as needed for wheezing  Qty: 8.5 g, Refills: 1    Comments: Substitution to a formulary equivalent within the same pharmaceutical class is authorized.  Associated Diagnoses: Moderate persistent asthma without complication      Alcaftadine (Lastacaft) 0.25 % SOLN Administer 1 drop to both eyes daily as needed (allergic eye symptoms)  Qty: 3 mL, Refills: 1    Associated Diagnoses: Allergic conjunctivitis of left eye      amitriptyline (ELAVIL) 10 mg tablet Take 2 tablets (20 mg total) by mouth daily at bedtime  Qty: 180 tablet, Refills: 1    Associated Diagnoses: Migraine without status migrainosus, not intractable, unspecified migraine type      azelastine (OPTIVAR) 0.05 % ophthalmic solution INSTILL 1 DROP INTO BOTH EYES TWICE A DAY  Qty: 18 mL, Refills: 1    Associated Diagnoses: Allergic conjunctivitis of both eyes      Blood Glucose Monitoring Suppl (OneTouch Verio Reflect) w/Device KIT Check blood sugars once daily. Please substitute with appropriate alternative as covered by patient's insurance. Dx: E11.65  Qty: 1 kit, Refills: 0    Comments: Please substitute with appropriate alternative as covered by patient's insurance  Associated Diagnoses: Type 2 diabetes mellitus without complication, without long-term current use of insulin (HCC)      busPIRone (BUSPAR) 5 mg tablet TAKE 1 TABLET BY MOUTH TWICE A DAY  Qty: 180 tablet, Refills: 1    Associated Diagnoses: Anxiety      cetirizine (ZyrTEC) 10 mg tablet Take 1 tablet (10 mg total) by mouth daily  Qty: 90 tablet, Refills: 1    Associated Diagnoses: Seasonal  allergies      Cholecalciferol (Vitamin D3) 25 MCG TABS TAKE 1 TABLET BY MOUTH EVERY DAY  Qty: 90 tablet, Refills: 1    Associated Diagnoses: Vitamin D deficiency      fluticasone (FLONASE) 50 mcg/act nasal spray SPRAY 2 SPRAYS INTO EACH NOSTRIL EVERY DAY  Qty: 48 mL, Refills: 1    Associated Diagnoses: Seasonal allergies      Fluticasone-Salmeterol (Wixela Inhub) 250-50 mcg/dose inhaler Inhale 1 puff 2 (two) times a day Rinse mouth after use.  Qty: 60 blister, Refills: 5    Comments: Substitution to a formulary equivalent within the same pharmaceutical class is authorized.  Associated Diagnoses: Moderate persistent asthma without complication      glucose blood (OneTouch Verio) test strip Check blood sugars once daily. Please substitute with appropriate alternative as covered by patient's insurance. Dx: E11.65  Qty: 100 each, Refills: 3    Comments: Please substitute with appropriate alternative as covered by patient's insurance  Associated Diagnoses: Type 2 diabetes mellitus without complication, without long-term current use of insulin ([facility])      losartan (COZAAR) 25 mg tablet TAKE 1 TABLET (25 MG TOTAL) BY MOUTH DAILY.  Qty: 90 tablet, Refills: 1    Associated Diagnoses: Type 2 diabetes mellitus without complication, without long-term current use of insulin ([facility])      meloxicam (Mobic) 15 mg tablet Take 1 tablet (15 mg total) by mouth daily  Qty: 30 tablet, Refills: 0    Associated Diagnoses: Primary osteoarthritis of both knees      metFORMIN (GLUCOPHAGE-XR) 500 mg 24 hr tablet TAKE 2 TABLETS BY MOUTH DAILY WITH DINNER  Qty: 180 tablet, Refills: 1    Associated Diagnoses: Type 2 diabetes mellitus without complication, without long-term current use of insulin ([facility])      metoprolol succinate (TOPROL-XL) 50 mg 24 hr tablet TAKE 1 TABLET BY MOUTH EVERY DAY  Qty: 90 tablet, Refills: 1    Associated Diagnoses: Accelerated hypertension      montelukast (SINGULAIR) 10 mg tablet Take 1 tablet (10 mg total) by mouth  daily at bedtime  Qty: 90 tablet, Refills: 1    Associated Diagnoses: Moderate persistent asthma without complication; Seasonal allergies      nystatin (MYCOSTATIN) powder Apply topically 3 (three) times a day  Qty: 45 g, Refills: 1    Associated Diagnoses: Candidiasis, intertrigo      omeprazole (PriLOSEC) 20 mg delayed release capsule Take 1 capsule (20 mg total) by mouth daily  Qty: 90 capsule, Refills: 1    Associated Diagnoses: Gastroesophageal reflux disease      OneTouch Delica Lancets 33G MISC Check blood sugars once daily. Please substitute with appropriate alternative as covered by patient's insurance. Dx: E11.65  Qty: 100 each, Refills: 3    Comments: Please substitute with appropriate alternative as covered by patient's insurance  Associated Diagnoses: Type 2 diabetes mellitus without complication, without long-term current use of insulin ([facility])      rosuvastatin (CRESTOR) 10 MG tablet TAKE 1 TABLET BY MOUTH EVERY DAY  Qty: 90 tablet, Refills: 1    Associated Diagnoses: Morbid obesity (HCC); Hyperlipidemia, unspecified hyperlipidemia type      tirzepatide (Mounjaro) 2.5 MG/0.5ML Inject 0.5 mL (2.5 mg total) under the skin every 7 days  Qty: 2 mL, Refills: 2    Associated Diagnoses: Type 2 diabetes mellitus without complication, without long-term current use of insulin ([facility])      topiramate (TOPAMAX) 25 mg tablet TAKE 3 TABLETS (75 MG TOTAL) BY MOUTH DAILY AT BEDTIME  Qty: 270 tablet, Refills: 1    Associated Diagnoses: Migraine without status migrainosus, not intractable, unspecified migraine type      ZOLMitriptan (Zomig) 5 MG tablet Take 1 tablet at onset of headache. Max 2 tablets per week.  Qty: 5 tablet, Refills: 1    Associated Diagnoses: Migraine without status migrainosus, not intractable, unspecified migraine type           No discharge procedures on file.  ED SEPSIS DOCUMENTATION   Time reflects when diagnosis was documented in both MDM as applicable and the Disposition within this note        Time User Action Codes Description Comment    12/16/2024  8:21 PM Antonino Ponce Add [R06.00] Dyspnea     12/16/2024  8:21 PM Antonino Ponce Add [J10.1] Influenza A     12/16/2024  8:22 PM Antonino Ponce Add [R09.02] Hypoxia     12/16/2024  9:38 PM Jose Roberto Aguilar Add [E87.6] Hypokalemia                  Antonino Ponce III, DO  12/17/24 1640

## 2024-12-17 ENCOUNTER — APPOINTMENT (OUTPATIENT)
Dept: PHYSICAL THERAPY | Facility: CLINIC | Age: 49
End: 2024-12-17
Payer: COMMERCIAL

## 2024-12-17 PROBLEM — A41.9 SEPSIS DUE TO PNEUMONIA (HCC): Status: ACTIVE | Noted: 2024-12-17

## 2024-12-17 PROBLEM — E87.6 HYPOKALEMIA: Status: ACTIVE | Noted: 2024-12-17

## 2024-12-17 PROBLEM — R06.89 ACUTE RESPIRATORY INSUFFICIENCY: Status: ACTIVE | Noted: 2024-12-17

## 2024-12-17 PROBLEM — J10.1 INFLUENZA A: Status: ACTIVE | Noted: 2024-12-17

## 2024-12-17 PROBLEM — J18.9 SEPSIS DUE TO PNEUMONIA (HCC): Status: ACTIVE | Noted: 2024-12-17

## 2024-12-17 LAB
ANION GAP SERPL CALCULATED.3IONS-SCNC: 8 MMOL/L (ref 4–13)
ATRIAL RATE: 108 BPM
BUN SERPL-MCNC: 6 MG/DL (ref 5–25)
CALCIUM SERPL-MCNC: 8.4 MG/DL (ref 8.4–10.2)
CHLORIDE SERPL-SCNC: 102 MMOL/L (ref 96–108)
CO2 SERPL-SCNC: 26 MMOL/L (ref 21–32)
CREAT SERPL-MCNC: 0.45 MG/DL (ref 0.6–1.3)
ERYTHROCYTE [DISTWIDTH] IN BLOOD BY AUTOMATED COUNT: 17 % (ref 11.6–15.1)
GFR SERPL CREATININE-BSD FRML MDRD: 119 ML/MIN/1.73SQ M
GLUCOSE SERPL-MCNC: 109 MG/DL (ref 65–140)
GLUCOSE SERPL-MCNC: 175 MG/DL (ref 65–140)
GLUCOSE SERPL-MCNC: 200 MG/DL (ref 65–140)
GLUCOSE SERPL-MCNC: 228 MG/DL (ref 65–140)
GLUCOSE SERPL-MCNC: 240 MG/DL (ref 65–140)
GLUCOSE SERPL-MCNC: 257 MG/DL (ref 65–140)
HCT VFR BLD AUTO: 34.2 % (ref 34.8–46.1)
HGB BLD-MCNC: 10.5 G/DL (ref 11.5–15.4)
L PNEUMO1 AG UR QL IA.RAPID: NEGATIVE
MCH RBC QN AUTO: 23.2 PG (ref 26.8–34.3)
MCHC RBC AUTO-ENTMCNC: 30.7 G/DL (ref 31.4–37.4)
MCV RBC AUTO: 76 FL (ref 82–98)
P AXIS: 41 DEGREES
PLATELET # BLD AUTO: 342 THOUSANDS/UL (ref 149–390)
PMV BLD AUTO: 9 FL (ref 8.9–12.7)
POTASSIUM SERPL-SCNC: 3.7 MMOL/L (ref 3.5–5.3)
PR INTERVAL: 118 MS
QRS AXIS: 31 DEGREES
QRSD INTERVAL: 84 MS
QT INTERVAL: 354 MS
QTC INTERVAL: 474 MS
RBC # BLD AUTO: 4.53 MILLION/UL (ref 3.81–5.12)
S PNEUM AG UR QL: NEGATIVE
SODIUM SERPL-SCNC: 136 MMOL/L (ref 135–147)
T WAVE AXIS: 65 DEGREES
VENTRICULAR RATE: 108 BPM
WBC # BLD AUTO: 8.35 THOUSAND/UL (ref 4.31–10.16)

## 2024-12-17 PROCEDURE — 94640 AIRWAY INHALATION TREATMENT: CPT

## 2024-12-17 PROCEDURE — 94760 N-INVAS EAR/PLS OXIMETRY 1: CPT

## 2024-12-17 PROCEDURE — 99223 1ST HOSP IP/OBS HIGH 75: CPT | Performed by: HOSPITALIST

## 2024-12-17 PROCEDURE — 87449 NOS EACH ORGANISM AG IA: CPT | Performed by: PHYSICIAN ASSISTANT

## 2024-12-17 PROCEDURE — 82948 REAGENT STRIP/BLOOD GLUCOSE: CPT

## 2024-12-17 PROCEDURE — 94664 DEMO&/EVAL PT USE INHALER: CPT

## 2024-12-17 PROCEDURE — 93010 ELECTROCARDIOGRAM REPORT: CPT | Performed by: INTERNAL MEDICINE

## 2024-12-17 PROCEDURE — 85027 COMPLETE CBC AUTOMATED: CPT | Performed by: PHYSICIAN ASSISTANT

## 2024-12-17 PROCEDURE — 80048 BASIC METABOLIC PNL TOTAL CA: CPT | Performed by: PHYSICIAN ASSISTANT

## 2024-12-17 RX ORDER — METOPROLOL SUCCINATE 50 MG/1
50 TABLET, EXTENDED RELEASE ORAL DAILY
Status: DISCONTINUED | OUTPATIENT
Start: 2024-12-17 | End: 2024-12-19 | Stop reason: HOSPADM

## 2024-12-17 RX ORDER — TOPIRAMATE 25 MG/1
75 TABLET, FILM COATED ORAL
Status: DISCONTINUED | OUTPATIENT
Start: 2024-12-17 | End: 2024-12-19 | Stop reason: HOSPADM

## 2024-12-17 RX ORDER — PRAVASTATIN SODIUM 40 MG
80 TABLET ORAL
Status: DISCONTINUED | OUTPATIENT
Start: 2024-12-17 | End: 2024-12-19 | Stop reason: HOSPADM

## 2024-12-17 RX ORDER — BUSPIRONE HYDROCHLORIDE 5 MG/1
5 TABLET ORAL 2 TIMES DAILY
Status: DISCONTINUED | OUTPATIENT
Start: 2024-12-17 | End: 2024-12-19 | Stop reason: HOSPADM

## 2024-12-17 RX ORDER — FLUTICASONE PROPIONATE 50 MCG
2 SPRAY, SUSPENSION (ML) NASAL DAILY
Status: DISCONTINUED | OUTPATIENT
Start: 2024-12-17 | End: 2024-12-19 | Stop reason: HOSPADM

## 2024-12-17 RX ORDER — MELOXICAM 7.5 MG/1
15 TABLET ORAL DAILY
Status: DISCONTINUED | OUTPATIENT
Start: 2024-12-17 | End: 2024-12-19 | Stop reason: HOSPADM

## 2024-12-17 RX ORDER — AZITHROMYCIN 250 MG/1
500 TABLET, FILM COATED ORAL EVERY 24 HOURS
Status: DISCONTINUED | OUTPATIENT
Start: 2024-12-17 | End: 2024-12-17

## 2024-12-17 RX ORDER — PANTOPRAZOLE SODIUM 40 MG/1
40 TABLET, DELAYED RELEASE ORAL
Status: DISCONTINUED | OUTPATIENT
Start: 2024-12-17 | End: 2024-12-19 | Stop reason: HOSPADM

## 2024-12-17 RX ORDER — AMITRIPTYLINE HYDROCHLORIDE 10 MG/1
20 TABLET ORAL
Status: DISCONTINUED | OUTPATIENT
Start: 2024-12-17 | End: 2024-12-19 | Stop reason: HOSPADM

## 2024-12-17 RX ORDER — NYSTATIN 100000 [USP'U]/G
POWDER TOPICAL 3 TIMES DAILY
Status: DISCONTINUED | OUTPATIENT
Start: 2024-12-17 | End: 2024-12-19 | Stop reason: HOSPADM

## 2024-12-17 RX ORDER — ALBUTEROL SULFATE 0.83 MG/ML
2.5 SOLUTION RESPIRATORY (INHALATION) EVERY 4 HOURS PRN
Status: DISCONTINUED | OUTPATIENT
Start: 2024-12-17 | End: 2024-12-17

## 2024-12-17 RX ORDER — SUMATRIPTAN SUCCINATE 25 MG/1
100 TABLET ORAL ONCE AS NEEDED
Status: DISCONTINUED | OUTPATIENT
Start: 2024-12-17 | End: 2024-12-19 | Stop reason: HOSPADM

## 2024-12-17 RX ORDER — MONTELUKAST SODIUM 10 MG/1
10 TABLET ORAL
Status: DISCONTINUED | OUTPATIENT
Start: 2024-12-17 | End: 2024-12-19 | Stop reason: HOSPADM

## 2024-12-17 RX ORDER — LORATADINE 10 MG/1
10 TABLET ORAL DAILY
Status: DISCONTINUED | OUTPATIENT
Start: 2024-12-17 | End: 2024-12-19 | Stop reason: HOSPADM

## 2024-12-17 RX ORDER — LEVOFLOXACIN 5 MG/ML
750 INJECTION, SOLUTION INTRAVENOUS EVERY 24 HOURS
Status: DISCONTINUED | OUTPATIENT
Start: 2024-12-17 | End: 2024-12-18

## 2024-12-17 RX ORDER — LOSARTAN POTASSIUM 25 MG/1
25 TABLET ORAL DAILY
Status: DISCONTINUED | OUTPATIENT
Start: 2024-12-17 | End: 2024-12-19 | Stop reason: HOSPADM

## 2024-12-17 RX ORDER — FLUTICASONE FUROATE AND VILANTEROL 200; 25 UG/1; UG/1
1 POWDER RESPIRATORY (INHALATION)
Status: DISCONTINUED | OUTPATIENT
Start: 2024-12-17 | End: 2024-12-19 | Stop reason: HOSPADM

## 2024-12-17 RX ADMIN — BUSPIRONE HYDROCHLORIDE 5 MG: 5 TABLET ORAL at 17:42

## 2024-12-17 RX ADMIN — PRAVASTATIN SODIUM 80 MG: 40 TABLET ORAL at 17:41

## 2024-12-17 RX ADMIN — NYSTATIN: 100000 POWDER TOPICAL at 08:57

## 2024-12-17 RX ADMIN — FLUTICASONE PROPIONATE 2 SPRAY: 50 SPRAY, METERED NASAL at 08:54

## 2024-12-17 RX ADMIN — INSULIN LISPRO 3 UNITS: 100 INJECTION, SOLUTION INTRAVENOUS; SUBCUTANEOUS at 17:41

## 2024-12-17 RX ADMIN — PANTOPRAZOLE SODIUM 40 MG: 40 TABLET, DELAYED RELEASE ORAL at 06:44

## 2024-12-17 RX ADMIN — AMITRIPTYLINE HYDROCHLORIDE 20 MG: 10 TABLET, FILM COATED ORAL at 22:26

## 2024-12-17 RX ADMIN — LEVALBUTEROL HYDROCHLORIDE 1.25 MG: 1.25 SOLUTION RESPIRATORY (INHALATION) at 20:47

## 2024-12-17 RX ADMIN — HEPARIN SODIUM 5000 UNITS: 5000 INJECTION, SOLUTION INTRAVENOUS; SUBCUTANEOUS at 22:26

## 2024-12-17 RX ADMIN — INSULIN LISPRO 1 UNITS: 100 INJECTION, SOLUTION INTRAVENOUS; SUBCUTANEOUS at 08:51

## 2024-12-17 RX ADMIN — METHYLPREDNISOLONE SODIUM SUCCINATE 40 MG: 40 INJECTION, POWDER, FOR SOLUTION INTRAMUSCULAR; INTRAVENOUS at 12:15

## 2024-12-17 RX ADMIN — ALBUTEROL SULFATE 2 PUFF: 90 AEROSOL, METERED RESPIRATORY (INHALATION) at 12:26

## 2024-12-17 RX ADMIN — METHYLPREDNISOLONE SODIUM SUCCINATE 40 MG: 40 INJECTION, POWDER, FOR SOLUTION INTRAMUSCULAR; INTRAVENOUS at 17:42

## 2024-12-17 RX ADMIN — INSULIN LISPRO 3 UNITS: 100 INJECTION, SOLUTION INTRAVENOUS; SUBCUTANEOUS at 22:27

## 2024-12-17 RX ADMIN — MELOXICAM 15 MG: 7.5 TABLET ORAL at 08:53

## 2024-12-17 RX ADMIN — HEPARIN SODIUM 5000 UNITS: 5000 INJECTION, SOLUTION INTRAVENOUS; SUBCUTANEOUS at 06:44

## 2024-12-17 RX ADMIN — HYDROCODONE BITARTRATE AND HOMATROPINE METHYLBROMIDE 5 ML: 1.5; 5 SOLUTION ORAL at 12:24

## 2024-12-17 RX ADMIN — NICOTINE 1 PATCH: 21 PATCH, EXTENDED RELEASE TRANSDERMAL at 08:53

## 2024-12-17 RX ADMIN — HYDROCODONE BITARTRATE AND HOMATROPINE METHYLBROMIDE 5 ML: 1.5; 5 SOLUTION ORAL at 02:26

## 2024-12-17 RX ADMIN — AMITRIPTYLINE HYDROCHLORIDE 20 MG: 10 TABLET, FILM COATED ORAL at 01:24

## 2024-12-17 RX ADMIN — MONTELUKAST 10 MG: 10 TABLET, FILM COATED ORAL at 01:24

## 2024-12-17 RX ADMIN — MONTELUKAST 10 MG: 10 TABLET, FILM COATED ORAL at 22:26

## 2024-12-17 RX ADMIN — LEVALBUTEROL HYDROCHLORIDE 1.25 MG: 1.25 SOLUTION RESPIRATORY (INHALATION) at 07:40

## 2024-12-17 RX ADMIN — Medication 1000 UNITS: at 08:53

## 2024-12-17 RX ADMIN — LORATADINE 10 MG: 10 TABLET ORAL at 08:53

## 2024-12-17 RX ADMIN — HEPARIN SODIUM 5000 UNITS: 5000 INJECTION, SOLUTION INTRAVENOUS; SUBCUTANEOUS at 15:00

## 2024-12-17 RX ADMIN — LOSARTAN POTASSIUM 25 MG: 25 TABLET, FILM COATED ORAL at 08:53

## 2024-12-17 RX ADMIN — METOPROLOL SUCCINATE 50 MG: 50 TABLET, EXTENDED RELEASE ORAL at 08:53

## 2024-12-17 RX ADMIN — LEVALBUTEROL HYDROCHLORIDE 1.25 MG: 1.25 SOLUTION RESPIRATORY (INHALATION) at 00:57

## 2024-12-17 RX ADMIN — ALBUTEROL SULFATE 2 PUFF: 90 AEROSOL, METERED RESPIRATORY (INHALATION) at 09:04

## 2024-12-17 RX ADMIN — INSULIN LISPRO 2 UNITS: 100 INJECTION, SOLUTION INTRAVENOUS; SUBCUTANEOUS at 12:16

## 2024-12-17 RX ADMIN — METHYLPREDNISOLONE SODIUM SUCCINATE 40 MG: 40 INJECTION, POWDER, FOR SOLUTION INTRAMUSCULAR; INTRAVENOUS at 06:44

## 2024-12-17 RX ADMIN — NYSTATIN: 100000 POWDER TOPICAL at 22:26

## 2024-12-17 RX ADMIN — LEVOFLOXACIN 750 MG: 750 INJECTION, SOLUTION INTRAVENOUS at 02:12

## 2024-12-17 RX ADMIN — FLUTICASONE FUROATE AND VILANTEROL TRIFENATATE 1 PUFF: 200; 25 POWDER RESPIRATORY (INHALATION) at 08:54

## 2024-12-17 RX ADMIN — BUSPIRONE HYDROCHLORIDE 5 MG: 5 TABLET ORAL at 08:53

## 2024-12-17 RX ADMIN — TOPIRAMATE 75 MG: 25 TABLET, FILM COATED ORAL at 22:26

## 2024-12-17 NOTE — ED ATTENDING ATTESTATION
12/16/2024  I, Jose Roberto Aguilar MD, saw and evaluated the patient. I have discussed the patient with the resident/non-physician practitioner and agree with the resident's/non-physician practitioner's findings, Plan of Care, and MDM as documented in the resident's/non-physician practitioner's note, except where noted. All available labs and Radiology studies were reviewed.  I was present for key portions of any procedure(s) performed by the resident/non-physician practitioner and I was immediately available to provide assistance.       At this point I agree with the current assessment done in the Emergency Department.  I have conducted an independent evaluation of this patient a history and physical is as follows:    ED Course patient admitted with chief diagnosis of influenza A, and hypoxia history of asthma patient is a tobacco user 1 pack/day.  Presented with a O2 sat of 83%.  Will be admitted for supportive care her symptoms started approximately 8 days ago.         Critical Care Time  Procedures

## 2024-12-17 NOTE — CASE MANAGEMENT
Case Management Assessment & Discharge Planning Note    Patient name Iris Curtis  Location /-01 MRN [id number]  : 1975 Date 2024       Current Admission Date: 2024  Current Admission Diagnosis:Sepsis due to pneumonia (HCC)   Patient Active Problem List    Diagnosis Date Noted Date Diagnosed    Sepsis due to pneumonia (HCC) 2024     Hypokalemia 2024     Influenza A 2024     Acute respiratory insufficiency 2024     Seasonal allergies 2024     Vitamin D deficiency 2024     Gastroesophageal reflux disease 2024     Anxiety 2024     Moderate persistent asthma without complication 2023     Migraine without status migrainosus, not intractable 2021     Morbid obesity (HCC) 2021     Type 2 diabetes mellitus without complication, without long-term current use of insulin ([facility]) 2017     Hyperlipidemia 2012       LOS (days): 0  Geometric Mean LOS (GMLOS) (days):   Days to GMLOS:     OBJECTIVE:              Current admission status: Observation       Preferred Pharmacy:   [facility]/pharmacy #[id number] [facility] [place] - [address]  [address] [place] [address]  Phone: [phone] Fax: [phone]    [facility]/pharmacy #[id number] - [place] - [address] [place] [address]  [address] [place], [place] [address]  Phone: [phone] Fax: [phone]    Primary Care Provider: MARGUERITE Patel    Primary Insurance: BLUE CROSS  Secondary Insurance:     ASSESSMENT:  Active Health Care Proxies    There are no active Health Care Proxies on file.       Advance Directives  Does patient have a Health Care POA?: No  Does patient have Advance Directives?: No  Primary Contact:               Patient Information  Admitted from:: Home  Mental Status: Alert  Assessment information provided by:: Patient  Primary Caregiver: Self  Support Systems: Spouse/significant other, Son, Family members  What city do you live in?:  [place]  Home entry access options. Select all that apply.: Stairs  Number of steps to enter home.: 1  Type of Current Residence: 2 story home  Upon entering residence, is there a bedroom on the main floor (no further steps)?: No  A bedroom is located on the following floor levels of residence (select all that apply):: 2nd Floor  Upon entering residence, is there a bathroom on the main floor (no further steps)?: Yes  Number of steps to 2nd floor from main floor: One Flight  Living Arrangements: Lives w/ Spouse/significant other, Lives w/ Children, Lives w/ Family members    Activities of Daily Living Prior to Admission  Functional Status: Independent  Completes ADLs independently?: Yes  Ambulates independently?: Yes  Does patient use assisted devices?: No  Does patient currently own DME?: No  Does patient have a history of Outpatient Therapy (PT/OT)?: No  Does the patient have a history of Short-Term Rehab?: No  Does patient have a history of HHC?: No         Patient Information Continued  Income Source: Employed  Does patient have prescription coverage?: Yes  Does patient receive dialysis treatments?: No  Does patient have a history of substance abuse?: No  Does patient have a history of Mental Health Diagnosis?: No         Means of Transportation  Means of Transport to Appts:: Drives Self          DISCHARGE DETAILS:    Discharge planning discussed with:: pt  Freedom of Choice: Yes                   Contacts  Patient Contacts:  [employer]  Relationship to Patient:: Family  Contact Method: Phone  Phone Number: [phone]  Reason/Outcome: Continuity of Care, Emergency Contact, Discharge Planning                   Would you like to participate in our [facility] service program?  : No - Declined                                                 Additional Comments: I PTA, drives, employed, resides with family

## 2024-12-17 NOTE — ASSESSMENT & PLAN NOTE
Lab Results   Component Value Date    HGBA1C 8.5 (A) 10/03/2024       Recent Labs     12/17/24  0010   POCGLU 109       Blood Sugar Average: Last 72 hrs:  (P) 109    Placed on [facility] step 2 diet  Hold prehospital oral antihyperglycemic's  Obtain Accu-Cheks before meals and at bedtime with Humalog correction dose before meals and at bedtime

## 2024-12-17 NOTE — H&P
H&P - Hospitalist   Name: Iris Curtis 49 y.o. female I MRN: [id number]  Unit/Bed#: -01 I Date of Admission: 12/16/2024   Date of Service: 12/17/2024 I Hospital Day: 0     Assessment & Plan  Sepsis due to pneumonia (HCC)  Placed in observation medicine  Trend lactic acid and procalcitonin  Patient received sepsis protocol IV fluid resuscitation in ER  Cultures are currently pending  Obtain urine for strep pneumo and Legionella  Placed on O2 and respiratory protocol  Give antipyretics and antitussives as needed  Give Levaquin 750 mg IV daily  Patient met sepsis criteria and that she was tachycardic with heart rate as high as 105, tachypneic with respiratory rate as high as 24 CT findings suspicious for atypical pneumonia  Hypokalemia  Initial potassium 2.9  Placed on telemetry  Patient is status post IV and p.o. repletion  Continue to monitor with repeat labs in a.m.  Influenza A  Patient began with symptoms 8 days ago  Send positive for flu A last evening  Patient is not a Tamiflu candidate  Supportive care  Type 2 diabetes mellitus without complication, without long-term current use of insulin ([facility])  Lab Results   Component Value Date    HGBA1C 8.5 (A) 10/03/2024       Recent Labs     12/17/24  0010   POCGLU 109       Blood Sugar Average: Last 72 hrs:  (P) 109    Placed on [facility] step 2 diet  Hold prehospital oral antihyperglycemic's  Obtain Accu-Cheks before meals and at bedtime with Humalog correction dose before meals and at bedtime  Hyperlipidemia  Substitute Pravachol 80 mg p.o. daily for prehospital Crestor  Morbid obesity (HCC)  Placed on [facility] step 2 diet  Encourage healthy weight loss and supportive care  Migraine without status migrainosus, not intractable  Substitute Imitrex 100 mg p.o. once as needed for prehospital Zomig, continue prehospital Elavil 20 mg p.o. nightly and Topamax 75 mg p.o. nightly  Moderate persistent asthma without complication  Placed on O2 and respiratory protocol  Give  Lovenox and Atrovent nebs 3 times daily  Continue prehospital singular 10 mg p.o. nightly  Give Solu-Medrol 40 mg IV every 8 hours  Gastroesophageal reflux disease  Substitute Protonix 40 mg p.o. daily for prehospital Prilosec  Anxiety  Continue prehospital BuSpar 5 mg p.o. twice daily  Acute respiratory insufficiency  Patient was hypoxic with an O2 saturation as low as 87% on room air  Currently requiring 2 L nasal cannula maintain O2 saturation of 90%  Secondary to pneumonia being treated as per above  Placed on O2 and respiratory protocol  Will give Solu-Medrol 40 mg IV every 8 hours      VTE Pharmacologic Prophylaxis: VTE Score: 5 High Risk (Score >/= 5) - Pharmacological DVT Prophylaxis Ordered: heparin. Sequential Compression Devices Ordered.  Code Status: Level 1 - Full Code per patient  Discussion with family: Updated  () at bedside.    Anticipated Length of Stay: Patient will be admitted on an observation basis with an anticipated length of stay of less than 2 midnights secondary to sepsis pneumonia requiring IV antibiotics, acute respiratory insufficiency requiring O2 support and IV steroids.    History of Present Illness   Chief Complaint: Shortness of breath x 8 days    Iris Curtis is a 49 y.o. female with a PMH of asthma, non-insulin-dependent diabetes who presents with cough and shortness of breath x 8 days patient presents ER for further evaluation and treatment of her 8-day history of cough which is nonproductive accompanied with some congestion, wheezing and dyspnea at rest.  Patient states that her home O2 saturation was as low as 83% last week but she did not come into the ER at that time.  Patient denies any recent sick contacts, no fever or chills.  While in ER patient was noted to be hypoxic with an O2 saturation as low as 87% was subsequently placed on O2 2 L nasal cannula maintain O2 saturation of 90%.    Workup in ER revealed that she was hypokalemic with a  potassium of 2.9 while she tested positive for influenza A and tripped for sepsis secondary to tachycardia and tachypnea with CT findings suspicious for atypical pneumonia.  Patient does states that she used her home nebulizer multiple times and states that she was using it every 3-4 hours with relief to start with and states that it was ineffective for the past day.  Patient does continue to smoke.    Review of Systems   Constitutional:  Positive for appetite change and fatigue. Negative for chills and fever.   HENT:  Negative for congestion and sore throat.    Respiratory:  Positive for cough and shortness of breath. Negative for wheezing.    Cardiovascular:  Negative for chest pain and palpitations.   Gastrointestinal:  Negative for abdominal pain, diarrhea, nausea and vomiting.   Genitourinary:  Negative for dysuria, frequency, hematuria and urgency.   Musculoskeletal:  Negative for arthralgias and myalgias.   Skin:  Negative for wound.   Neurological:  Negative for dizziness, syncope, light-headedness and headaches.   All other systems reviewed and are negative.      Historical Information   Past Medical History:   Diagnosis Date    Allergic     Asthma     Diabetes mellitus (HCC)     Hypertension     Migraines      Past Surgical History:   Procedure Laterality Date    HERNIA REPAIR       Social History     Tobacco Use    Smoking status: Every Day     Current packs/day: 1.00     Types: Cigarettes    Smokeless tobacco: Current   Vaping Use    Vaping status: Never Used   Substance and Sexual Activity    Alcohol use: Never    Drug use: Never    Sexual activity: Not on file     E-Cigarette/Vaping    E-Cigarette Use Never User      E-Cigarette/Vaping Substances    Nicotine No     THC No     CBD No     Flavoring No     Other No     Unknown No      Family history non-contributory  Social History:  Marital Status: /Civil Union   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  Prior to Admission medications    Medication Sig Start Date End Date Taking? Authorizing Provider   albuterol (2.5 mg/3 mL) 0.083 % nebulizer solution Take 3 mL (2.5 mg total) by nebulization every 4 (four) hours as needed for wheezing 10/3/24  Yes MARGUERITE Patle   albuterol (PROVENTIL HFA,VENTOLIN HFA) 90 mcg/act inhaler Inhale 2 puffs every 6 (six) hours as needed for wheezing 10/3/24  Yes MARGUERITE Patel   Alcaftadine (Lastacaft) 0.25 % SOLN Administer 1 drop to both eyes daily as needed (allergic eye symptoms) 1/11/23  Yes Antony Carvalho, DO   amitriptyline (ELAVIL) 10 mg tablet Take 2 tablets (20 mg total) by mouth daily at bedtime 3/19/24  Yes MARGUERITE Patel   azelastine (OPTIVAR) 0.05 % ophthalmic solution INSTILL 1 DROP INTO BOTH EYES TWICE A DAY 8/29/23  Yes Antony Carvalho DO   Blood Glucose Monitoring Suppl (OneTouch Verio Reflect) w/Device KIT Check blood sugars once daily. Please substitute with appropriate alternative as covered by patient's insurance. Dx: E11.65 6/27/24  Yes MARGUERITE Patel   busPIRone (BUSPAR) 5 mg tablet TAKE 1 TABLET BY MOUTH TWICE A DAY 10/10/24  Yes MARGUERITE Patel   cetirizine (ZyrTEC) 10 mg tablet Take 1 tablet (10 mg total) by mouth daily 3/19/24  Yes MARGUERITE Patel   Cholecalciferol (Vitamin D3) 25 MCG TABS TAKE 1 TABLET BY MOUTH EVERY DAY 9/18/24  Yes MARGUERITE Patel   fluticasone (FLONASE) 50 mcg/act nasal spray SPRAY 2 SPRAYS INTO EACH NOSTRIL EVERY DAY 6/18/24  Yes MARGUERITE Patel   Fluticasone-Salmeterol (Wixela Inhub) 250-50 mcg/dose inhaler Inhale 1 puff 2 (two) times a day Rinse mouth after use. 3/25/24  Yes MARGUERITE Patel   glucose blood  (OneTouch Verio) test strip Check blood sugars once daily. Please substitute with appropriate alternative as covered by patient's insurance. Dx: E11.65 6/27/24  Yes MARGUERITE Patel   losartan (COZAAR) 25 mg tablet TAKE 1 TABLET (25 MG TOTAL) BY MOUTH DAILY. 9/14/24  Yes MARGUERITE Patel   meloxicam (Mobic) 15 mg tablet Take 1 tablet (15 mg total) by mouth daily 10/14/24  Yes Thomas Torres PA-C   metFORMIN (GLUCOPHAGE-XR) 500 mg 24 hr tablet TAKE 2 TABLETS BY MOUTH DAILY WITH DINNER 9/14/24  Yes MARGUERITE Patel   metoprolol succinate (TOPROL-XL) 50 mg 24 hr tablet TAKE 1 TABLET BY MOUTH EVERY DAY 10/13/24  Yes MARGUERITE Patel   montelukast (SINGULAIR) 10 mg tablet Take 1 tablet (10 mg total) by mouth daily at bedtime 3/19/24  Yes MARGUERITE Patel   nystatin (MYCOSTATIN) powder Apply topically 3 (three) times a day 1/11/23  Yes Antony Carvalho DO   omeprazole (PriLOSEC) 20 mg delayed release capsule Take 1 capsule (20 mg total) by mouth daily 3/19/24  Yes MARGUERITE Patel   OneTouch Delica Lancets 33G MISC Check blood sugars once daily. Please substitute with appropriate alternative as covered by patient's insurance. Dx: E11.65 6/27/24  Yes MARGUERITE Patel   rosuvastatin (CRESTOR) 10 MG tablet TAKE 1 TABLET BY MOUTH EVERY DAY 9/14/24  Yes MARGUERITE Patel   tirzepatide (Mounjaro) 2.5 MG/0.5ML Inject 0.5 mL (2.5 mg total) under the skin every 7 days 10/3/24  Yes MARGUERITE Patel   topiramate (TOPAMAX) 25 mg tablet TAKE 3 TABLETS (75 MG TOTAL) BY MOUTH DAILY AT BEDTIME 5/13/24 12/17/24 Yes MARGUERITE Patel   ZOLMitriptan (Zomig) 5 MG tablet Take 1 tablet at onset of headache. Max 2 tablets per week. 12/21/23   Antony Carvalho, DO     Allergies   Allergen Reactions    Aspirin Rash     Blisters in mouth    Cephalexin Rash       Objective :  Temp:  [98.4 °F (36.9 °C)] 98.4 °F (36.9 °C)  HR:  [] 105  BP: (155-171)/(82-89)  155/88  Resp:  [18-24] 20  SpO2:  [87 %-91 %] 90 %  O2 Device: Nasal cannula  Nasal Cannula O2 Flow Rate (L/min):  [2 L/min-3 L/min] 2 L/min    Physical Exam  Vitals and nursing note reviewed.   Constitutional:       Appearance: She is well-developed.   HENT:      Head: Normocephalic and atraumatic.      Right Ear: Tympanic membrane normal.      Left Ear: Tympanic membrane normal.      Nose: Nose normal.      Mouth/Throat:      Mouth: Mucous membranes are moist.      Pharynx: No oropharyngeal exudate.   Eyes:      General: No scleral icterus.     Pupils: Pupils are equal, round, and reactive to light.   Neck:      Vascular: No JVD.   Cardiovascular:      Rate and Rhythm: Normal rate and regular rhythm.      Heart sounds: Normal heart sounds. No murmur heard.  Pulmonary:      Effort: Pulmonary effort is normal. No respiratory distress.      Breath sounds: Rhonchi present. No wheezing or rales.   Abdominal:      General: Bowel sounds are normal.      Palpations: Abdomen is soft.      Tenderness: There is no abdominal tenderness. There is no guarding or rebound.   Musculoskeletal:         General: Normal range of motion.      Cervical back: Normal range of motion and neck supple.      Right lower leg: No edema.      Left lower leg: No edema.   Lymphadenopathy:      Cervical: No cervical adenopathy.   Skin:     General: Skin is warm and dry.      Findings: No erythema or rash.   Neurological:      Mental Status: She is alert and oriented to person, place, and time.   Psychiatric:         Behavior: Behavior normal.          Lines/Drains:            Lab Results: I have reviewed the following results:  Results from last 7 days   Lab Units 12/16/24 1913   WBC Thousand/uL 10.40*   HEMOGLOBIN g/dL 11.4*   HEMATOCRIT % 37.9   PLATELETS Thousands/uL 376   BANDS PCT % 2   LYMPHO PCT % 18   MONO PCT % 7   EOS PCT % 1     Results from last 7 days   Lab Units 12/16/24 1913   SODIUM mmol/L 136   POTASSIUM mmol/L 2.9*   CHLORIDE  mmol/L 97   CO2 mmol/L 28   BUN mg/dL 6   CREATININE mg/dL 0.54*   ANION GAP mmol/L 11   CALCIUM mg/dL 9.0   ALBUMIN g/dL 3.7   TOTAL BILIRUBIN mg/dL 0.53   ALK PHOS U/L 79   ALT U/L 80*   AST U/L 28   GLUCOSE RANDOM mg/dL 136     Results from last 7 days   Lab Units 12/16/24  1913   INR  1.04     Results from last 7 days   Lab Units 12/17/24  0010   POC GLUCOSE mg/dl 109     Lab Results   Component Value Date    HGBA1C 8.5 (A) 10/03/2024    HGBA1C 7.9 (H) 06/25/2024    HGBA1C 7.5 (H) 03/18/2024     Results from last 7 days   Lab Units 12/16/24  1938 12/16/24 1913   LACTIC ACID mmol/L 1.3  --    PROCALCITONIN ng/ml  --  0.08       Imaging Results Review: I reviewed radiology reports from this admission including: CT chest.  Other Study Results Review: No additional pertinent studies reviewed.    Administrative Statements   I have spent a total time of 60 minutes in caring for this patient on the day of the visit/encounter including Diagnostic results, Risk factor reductions, Impressions, Documenting in the medical record, Reviewing / ordering tests, medicine, procedures  , and Obtaining or reviewing history  .    ** Please Note: This note has been constructed using a voice recognition system. **

## 2024-12-17 NOTE — ASSESSMENT & PLAN NOTE
Placed on O2 and respiratory protocol  Give Lovenox and Atrovent nebs 3 times daily  Continue prehospital singular 10 mg p.o. nightly  Give Solu-Medrol 40 mg IV every 8 hours

## 2024-12-17 NOTE — ASSESSMENT & PLAN NOTE
Patient was hypoxic with an O2 saturation as low as 87% on room air  Currently requiring 2 L nasal cannula maintain O2 saturation of 90%  Secondary to pneumonia being treated as per above  Placed on O2 and respiratory protocol  Will give Solu-Medrol 40 mg IV every 8 hours

## 2024-12-17 NOTE — PLAN OF CARE
Problem: PAIN - ADULT  Goal: Verbalizes/displays adequate comfort level or baseline comfort level  Description: Interventions:  - Encourage patient to monitor pain and request assistance  - Assess pain using appropriate pain scale  - Administer analgesics based on type and severity of pain and evaluate response  - Implement non-pharmacological measures as appropriate and evaluate response  - Consider cultural and social influences on pain and pain management  - Notify physician/advanced practitioner if interventions unsuccessful or patient reports new pain  Outcome: Progressing     Problem: INFECTION - ADULT  Goal: Absence or prevention of progression during hospitalization  Description: INTERVENTIONS:  - Assess and monitor for signs and symptoms of infection  - Monitor lab/diagnostic results  - Monitor all insertion sites, i.e. indwelling lines, tubes, and drains  - Monitor endotracheal if appropriate and nasal secretions for changes in amount and color  - [place] appropriate cooling/warming therapies per order  - Administer medications as ordered  - Instruct and encourage patient and family to use good hand hygiene technique  - Identify and instruct in appropriate isolation precautions for identified infection/condition  Outcome: Progressing  Goal: Absence of fever/infection during neutropenic period  Description: INTERVENTIONS:  - Monitor WBC    Outcome: Progressing     Problem: SAFETY ADULT  Goal: Patient will remain free of falls  Description: INTERVENTIONS:  - Educate patient/family on patient safety including physical limitations  - Instruct patient to call for assistance with activity   - Consult OT/PT to assist with strengthening/mobility   - Keep Call bell within reach  - Keep bed low and locked with side rails adjusted as appropriate  - Keep care items and personal belongings within reach  - Initiate and maintain comfort rounds  - Make Fall Risk Sign visible to staff  - Offer Toileting every 2 Hours,  in advance of need  - Initiate/Maintain bed alarm  - Obtain necessary fall risk management equipment: bed alarm   - Apply yellow socks and bracelet for high fall risk patients  - Consider moving patient to room near nurses station  Outcome: Progressing  Goal: Maintain or return to baseline ADL function  Description: INTERVENTIONS:  -  Assess patient's ability to carry out ADLs; assess patient's baseline for ADL function and identify physical deficits which impact ability to perform ADLs (bathing, care of mouth/teeth, toileting, grooming, dressing, etc.)  - Assess/evaluate cause of self-care deficits   - Assess range of motion  - Assess patient's mobility; develop plan if impaired  - Assess patient's need for assistive devices and provide as appropriate  - Encourage maximum independence but intervene and supervise when necessary  - Involve family in performance of ADLs  - Assess for home care needs following discharge   - Consider OT consult to assist with ADL evaluation and planning for discharge  - Provide patient education as appropriate  Outcome: Progressing  Goal: Maintains/Returns to pre admission functional level  Description: INTERVENTIONS:  - Perform AM-PAC 6 Click Basic Mobility/ Daily Activity assessment daily.  - Set and communicate daily mobility goal to care team and patient/family/caregiver.   - Collaborate with rehabilitation services on mobility goals if consulted  - Perform Range of Motion 2 times a day.  - Reposition patient every 2 hours.  - Dangle patient 2 times a day  - Stand patient 2 times a day  - Ambulate patient 2 times a day  - Out of bed to chair 2 times a day   - Out of bed for meals 2 times a day  - Out of bed for toileting  - Record patient progress and toleration of activity level   Outcome: Progressing     Problem: DISCHARGE PLANNING  Goal: Discharge to home or other facility with appropriate resources  Description: INTERVENTIONS:  - Identify barriers to discharge w/patient and  caregiver  - Arrange for needed discharge resources and transportation as appropriate  - Identify discharge learning needs (meds, wound care, etc.)  - Arrange for interpretive services to assist at discharge as needed  - Refer to Case Management Department for coordinating discharge planning if the patient needs post-hospital services based on physician/advanced practitioner order or complex needs related to functional status, cognitive ability, or social support system  Outcome: Progressing     Problem: Knowledge Deficit  Goal: Patient/family/caregiver demonstrates understanding of disease process, treatment plan, medications, and discharge instructions  Description: Complete learning assessment and assess knowledge base.  Interventions:  - Provide teaching at level of understanding  - Provide teaching via preferred learning methods  Outcome: Progressing     Problem: Nutrition/Hydration-ADULT  Goal: Nutrient/Hydration intake appropriate for improving, restoring or maintaining nutritional needs  Description: Monitor and assess patient's nutrition/hydration status for malnutrition. Collaborate with interdisciplinary team and initiate plan and interventions as ordered.  Monitor patient's weight and dietary intake as ordered or per policy. Utilize nutrition screening tool and intervene as necessary. Determine patient's food preferences and provide high-protein, high-caloric foods as appropriate.     INTERVENTIONS:  - Monitor oral intake, urinary output, labs, and treatment plans  - Assess nutrition and hydration status and recommend course of action  - Evaluate amount of meals eaten  - Assist patient with eating if necessary   - Allow adequate time for meals  - Recommend/ encourage appropriate diets, oral nutritional supplements, and vitamin/mineral supplements  - Order, calculate, and assess calorie counts as needed  - Recommend, monitor, and adjust tube feedings and TPN/PPN based on assessed needs  - Assess need for  intravenous fluids  - Provide specific nutrition/hydration education as appropriate  - Include patient/family/caregiver in decisions related to nutrition  Outcome: Progressing

## 2024-12-17 NOTE — RESPIRATORY THERAPY NOTE
RT Protocol Note  Iris Curtis 49 y.o. female MRN: [id number]  Unit/Bed#: -01 Encounter: [id number]    Assessment    Principal Problem:    Sepsis due to pneumonia (HCC)  Active Problems:    Type 2 diabetes mellitus without complication, without long-term current use of insulin (HCC)    Hyperlipidemia    Morbid obesity (HCC)    Migraine without status migrainosus, not intractable    Moderate persistent asthma without complication    Gastroesophageal reflux disease    Anxiety    Hypokalemia    Influenza A    Acute respiratory insufficiency      Home Pulmonary Medications:    Home Devices/Therapy: Other (Comment) (mdi/nebs)    Past Medical History:   Diagnosis Date    Allergic     Asthma     Diabetes mellitus (HCC)     Hypertension     Migraines      Social History     Socioeconomic History    Marital status: /Civil Union     Spouse name: None    Number of children: None    Years of education: None    Highest education level: None   Occupational History    None   Tobacco Use    Smoking status: Every Day     Current packs/day: 1.00     Types: Cigarettes    Smokeless tobacco: Current   Vaping Use    Vaping status: Never Used   Substance and Sexual Activity    Alcohol use: Never    Drug use: Never    Sexual activity: None   Other Topics Concern    None   Social History Narrative    None     Social Drivers of Health     Financial Resource Strain: Not on file   Food Insecurity: No Food Insecurity (12/17/2024)    Nursing - Inadequate Food Risk Classification     Worried About Running Out of Food in the Last Year: Not on file     Ran Out of Food in the Last Year: Not on file     Ran Out of Food in the Last Year: 1   Transportation Needs: No Transportation Needs (12/17/2024)    Nursing - Transportation Risk Classification     Lack of Transportation: Not on file     Lack of Transportation: 2   Physical Activity: Not on file   Stress: Not on file   Social Connections: Not on file   Intimate Partner Violence:  Unknown (2024)    Nursing IPS     Feels Physically and Emotionally Safe: Not on file     Physically Hurt by Someone: Not on file     Humiliated or Emotionally Abused by Someone: Not on file     Physically Hurt by Someone: 2     Hurt or Threatened by Someone: 2   Housing Stability: Unknown (2024)    Nursing: Inadequate Housing Risk Classification     Has Housing: Not on file     Worried About Losing Housing: Not on file     Unable to Get Utilities: Not on file     Unable to Pay for Housing in the Last Year: 2     Has Housin       Subjective         Objective    Physical Exam:   Assessment Type: Assess only  General Appearance: Alert, Awake  Respiratory Pattern: Dyspnea with exertion  Chest Assessment: Chest expansion symmetrical  Bilateral Breath Sounds: Diminished, Rales, Scattered, Expiratory wheezes  Cough: None    Vitals:  Blood pressure 155/88, pulse 105, temperature 98.6 °F (37 °C), resp. rate 20, SpO2 90%.          Imaging and other studies:           Plan    Respiratory Plan: Home Bronchodilator Patient pathway        Resp Comments: (P) pt assessed as per protocol, pt states she uses nebs and mdi's prn at home but has been using both frequently recently, denies having home o2, cpap/bipap, pt admitted w/ flu/pnuemonia, will follow pt w/ tid nebs, o2 at 2l/m applied

## 2024-12-17 NOTE — ED PROVIDER NOTES
Time reflects when diagnosis was documented in both MDM as applicable and the Disposition within this note       Time User Action Codes Description Comment    12/16/2024  8:21 PM Antonino Ponce Add [R06.00] Dyspnea     12/16/2024  8:21 PM Antonino Ponce Add [J10.1] Influenza A     12/16/2024  8:22 PM Antonino Ponce Add [R09.02] Hypoxia     12/16/2024  9:38 PM Jose Roberto Aguilar Add [E87.6] Hypokalemia           ED Disposition       ED Disposition   Admit    Condition   Stable    Date/Time   Mon Dec 16, 2024  8:21 PM    Comment   Case was discussed with d and the patient's admission status was agreed to be Admission Status: observation status to the service of Dr. bianka Wesley               Assessment & Plan       [facility]         Medications   azithromycin (ZITHROMAX) 500 mg in sodium chloride 0.9 % 250 mL IVPB (has no administration in time range)   potassium chloride (Klor-Con M20) CR tablet 40 mEq (has no administration in time range)   potassium chloride 20 mEq IVPB (premix) (has no administration in time range)   multi-electrolyte (PLASMALYTE-A/ISOLYTE-S PH 7.4) IV solution 1,000 mL (0 mL Intravenous Stopped 12/16/24 2002)     Followed by   multi-electrolyte (PLASMALYTE-A/ISOLYTE-S PH 7.4) IV solution 1,000 mL (1,000 mL Intravenous New Bag 12/16/24 2036)   iohexol (OMNIPAQUE) 350 MG/ML injection (MULTI-DOSE) 85 mL (85 mL Intravenous Given 12/16/24 2014)       ED Risk Strat Scores                          SBIRT 22yo+      Flowsheet Row Most Recent Value   Initial Alcohol Screen: US AUDIT-C     1. How often do you have a drink containing alcohol? 0 Filed at: 12/16/2024 1955   2. How many drinks containing alcohol do you have on a typical day you are drinking?  0 Filed at: 12/16/2024 1955   Audit-C Score 0 Filed at: 12/16/2024 1955   HAILE: How many times in the past year have you...    Used an illegal drug or used a prescription medication for non-medical reasons? Never Filed at: 12/16/2024 1955                             History of Present Illness       Chief Complaint   Patient presents with    Shortness of Breath     Sob, cough, dry heaving, no appetite, over the last week; states oxygen was at 84% a couple of days ago but did not want to come in       Past Medical History:   Diagnosis Date    Allergic     Asthma     Diabetes mellitus (HCC)     Hypertension     Migraines       Past Surgical History:   Procedure Laterality Date    HERNIA REPAIR        Family History   Problem Relation Age of Onset    COPD Mother     Diabetes Mother     COPD Father     No Known Problems Sister     No Known Problems Maternal Grandmother     No Known Problems Paternal Grandmother     No Known Problems Maternal Aunt     No Known Problems Maternal Aunt     No Known Problems Paternal Aunt       Social History     Tobacco Use    Smoking status: Every Day     Current packs/day: 1.00     Types: Cigarettes    Smokeless tobacco: Current   Vaping Use    Vaping status: Never Used   Substance Use Topics    Alcohol use: Never    Drug use: Never      E-Cigarette/Vaping    E-Cigarette Use Never User       E-Cigarette/Vaping Substances    Nicotine No     THC No     CBD No     Flavoring No     Other No     Unknown No       I have reviewed and agree with the history as documented.     HPI    Review of Systems        Objective       ED Triage Vitals   Temperature Pulse Blood Pressure Respirations SpO2 Patient Position - Orthostatic VS   12/16/24 1859 12/16/24 1858 12/16/24 1858 12/16/24 1858 12/16/24 1858 12/16/24 2045   98.4 °F (36.9 °C) 105 163/89 (!) 24 (!) 87 % Sitting      Temp src Heart Rate Source BP Location FiO2 (%) Pain Score    -- 12/16/24 1858 12/16/24 2045 -- --     Monitor Right arm        Vitals      Date and Time Temp Pulse SpO2 Resp BP Pain Score FACES Pain Rating User   12/16/24 2045 -- 97 90 % 20 171/82 -- -- KB   12/16/24 1859 98.4 °F (36.9 °C) -- 91 % -- -- -- -- AF   12/16/24 1858 -- 105 87 % 24 163/89 -- -- AF            Physical  Exam    Results Reviewed       Procedure Component Value Units Date/Time    Lactic acid [[id number]]  (Normal) Collected: 12/16/24 1938    Lab Status: Final result Specimen: Blood from Arm, Left Updated: 12/16/24 2003     LACTIC ACID 1.3 mmol/L     Narrative:      Result may be elevated if tourniquet was used during collection.    RBC Morphology Reflex Test [[id number]] Collected: 12/16/24 1913    Lab Status: Final result Specimen: Blood from Arm, Left Updated: 12/16/24 2001    FLU/RSV/COVID - if FLU/RSV clinically relevant (2hr TAT) [[id number]]  (Abnormal) Collected: 12/16/24 1913    Lab Status: Final result Specimen: Nares from Nose Updated: 12/16/24 2001     SARS-CoV-2 Negative     INFLUENZA A PCR Positive     INFLUENZA B PCR Negative     RSV PCR Negative    Narrative:      This test has been performed using the CoV-2/Flu/RSV plus assay on the [employer] GeneXpert platform. This test has been validated by the  and verified by the performing laboratory.     This test is designed to amplify and detect the following: nucleocapsid (N), envelope (E), and RNA-dependent RNA polymerase (RdRP) genes of the SARS-CoV-2 genome; matrix (M), basic polymerase (PB2), and acidic protein (PA) segments of the influenza A genome; matrix (M) and non-structural protein (NS) segments of the influenza B genome, and the nucleocapsid genes of RSV A and RSV B.     Positive results are indicative of the presence of Flu A, Flu B, RSV, and/or SARS-CoV-2 RNA. Positive results for SARS-CoV-2 or suspected novel influenza should be reported to state, local, or federal health departments according to local reporting requirements.      All results should be assessed in conjunction with clinical presentation and other laboratory markers for clinical management.     FOR PEDIATRIC PATIENTS - copy/paste COVID Guidelines URL to browser: [email or web address]       Procalcitonin [[id number]]   (Normal) Collected: 12/16/24 1913    Lab Status: Final result Specimen: Blood from Arm, Left Updated: 12/16/24 1958     Procalcitonin 0.08 ng/ml     CBC and differential [[id number]]  (Abnormal) Collected: 12/16/24 1913    Lab Status: Final result Specimen: Blood from Arm, Left Updated: 12/16/24 1947     WBC 10.40 Thousand/uL      RBC 5.05 Million/uL      Hemoglobin 11.4 g/dL      Hematocrit 37.9 %      MCV 75 fL      MCH 22.6 pg      MCHC 30.1 g/dL      RDW 17.0 %      MPV 8.9 fL      Platelets 376 Thousands/uL     Narrative:      This is an appended report.  These results have been appended to a previously verified report.    Manual Differential(PHLEBS Do Not Order) [[id number]]  (Abnormal) Collected: 12/16/24 1913    Lab Status: Final result Specimen: Blood from Arm, Left Updated: 12/16/24 1947     Segmented % 69 %      Bands % 2 %      Lymphocytes % 18 %      Monocytes % 7 %      Eosinophils % 1 %      Basophils % 0 %      Atypical Lymphocytes % 3 %      Absolute Neutrophils 7.38 Thousand/uL      Absolute Lymphocytes 2.18 Thousand/uL      Absolute Monocytes 0.73 Thousand/uL      Absolute Eosinophils 0.10 Thousand/uL      Absolute Basophils 0.00 Thousand/uL      Total Counted --     Toxic Granulation Present     RBC Morphology Present     Platelet Estimate Adequate     Large Platelet Present     Anisocytosis Present     Microcytes Present    Blood culture #2 [[id number]] Collected: 12/16/24 1938    Lab Status: In process Specimen: Blood from Hand, Right Updated: 12/16/24 1944    Blood culture #1 [[id number]] Collected: 12/16/24 1938    Lab Status: In process Specimen: Blood from Arm, Left Updated: 12/16/24 1944    HS Troponin 0hr (reflex protocol) [[id number]]  (Normal) Collected: 12/16/24 1913    Lab Status: Final result Specimen: Blood from Arm, Left Updated: 12/16/24 1941     hs TnI 0hr <2 ng/L     B-Type Natriuretic Peptide(BNP) [[id number]]  (Normal) Collected: 12/16/24 1913    Lab Status: Final result Specimen:  Blood from Arm, Left Updated: 12/16/24 1940     BNP 60 pg/mL     Comprehensive metabolic panel [[id number]]  (Abnormal) Collected: 12/16/24 1913    Lab Status: Final result Specimen: Blood from Arm, Left Updated: 12/16/24 1935     Sodium 136 mmol/L      Potassium 2.9 mmol/L      Chloride 97 mmol/L      CO2 28 mmol/L      ANION GAP 11 mmol/L      BUN 6 mg/dL      Creatinine 0.54 mg/dL      Glucose 136 mg/dL      Calcium 9.0 mg/dL      AST 28 U/L      ALT 80 U/L      Alkaline Phosphatase 79 U/L      Total Protein 7.5 g/dL      Albumin 3.7 g/dL      Total Bilirubin 0.53 mg/dL      eGFR 112 ml/min/1.73sq m     Narrative:      National Kidney Disease Foundation guidelines for Chronic Kidney Disease (CKD):     Stage 1 with normal or high GFR (GFR > 90 mL/min/1.73 square meters)    Stage 2 Mild CKD (GFR = 60-89 mL/min/1.73 square meters)    Stage 3A Moderate CKD (GFR = 45-59 mL/min/1.73 square meters)    Stage 3B Moderate CKD (GFR = 30-44 mL/min/1.73 square meters)    Stage 4 Severe CKD (GFR = 15-29 mL/min/1.73 square meters)    Stage 5 End Stage CKD (GFR <15 mL/min/1.73 square meters)  Note: GFR calculation is accurate only with a steady state creatinine    D-Dimer [[id number]]  (Abnormal) Collected: 12/16/24 1913    Lab Status: Final result Specimen: Blood from Arm, Left Updated: 12/16/24 1935     D-Dimer, Quant 0.81 ug/ml FEU     Protime-INR [[id number]]  (Normal) Collected: 12/16/24 1913    Lab Status: Final result Specimen: Blood from Arm, Left Updated: 12/16/24 1934     Protime 14.1 seconds      INR 1.04    Narrative:      INR Therapeutic Range    Indication                                             INR Range      Atrial Fibrillation                                               2.0-3.0  Hypercoagulable State                                    2.0.2.3  Left Ventricular Asist Device                            2.0-3.0  Mechanical Heart Valve                                  -    Aortic(with afib, MI, embolism, HF, LA  enlargement,    and/or coagulopathy)                                     2.0-3.0 (2.5-3.5)     Mitral                                                             2.5-3.5  Prosthetic/Bioprosthetic Heart Valve               2.0-3.0  Venous thromboembolism (VTE: VT, PE        2.0-3.0    APTT [[id number]]  (Abnormal) Collected: 12/16/24 1913    Lab Status: Final result Specimen: Blood from Arm, Left Updated: 12/16/24 1934     PTT 36 seconds             CTA chest pe study   Final Interpretation by Jin Noguera MD (12/16 2112)         1. Groundglass opacities throughout the lungs may represent acute pneumonitis or atypical pneumonia.   2. No evidence of acute pulmonary embolus.                  Workstation performed: [id number]         XR chest 1 view portable   ED Interpretation by Antonino Ponce III, DO (12/16 1949)   Significantly underpenetrated portable chest x-ray, possible PTA on R side.          Procedures    ED Medication and Procedure Management   Prior to Admission Medications   Prescriptions Last Dose Informant Patient Reported? Taking?   Alcaftadine (Lastacaft) 0.25 % SOLN   No No   Sig: Administer 1 drop to both eyes daily as needed (allergic eye symptoms)   Blood Glucose Monitoring Suppl (OneTouch Verio Reflect) w/Device KIT   No No   Sig: Check blood sugars once daily. Please substitute with appropriate alternative as covered by patient's insurance. Dx: E11.65   Cholecalciferol (Vitamin D3) 25 MCG TABS   No No   Sig: TAKE 1 TABLET BY MOUTH EVERY DAY   Fluticasone-Salmeterol (Wixela Inhub) 250-50 mcg/dose inhaler   No No   Sig: Inhale 1 puff 2 (two) times a day Rinse mouth after use.   OneTouch Delica Lancets 33G MISC   No No   Sig: Check blood sugars once daily. Please substitute with appropriate alternative as covered by patient's insurance. Dx: E11.65   ZOLMitriptan (Zomig) 5 MG tablet   No No   Sig: Take 1 tablet at onset of headache. Max 2 tablets per week.   albuterol (2.5 mg/3 mL) 0.083  % nebulizer solution   No No   Sig: Take 3 mL (2.5 mg total) by nebulization every 4 (four) hours as needed for wheezing   albuterol (PROVENTIL HFA,VENTOLIN HFA) 90 mcg/act inhaler   No No   Sig: Inhale 2 puffs every 6 (six) hours as needed for wheezing   amitriptyline (ELAVIL) 10 mg tablet   No No   Sig: Take 2 tablets (20 mg total) by mouth daily at bedtime   azelastine (OPTIVAR) 0.05 % ophthalmic solution   No No   Sig: INSTILL 1 DROP INTO BOTH EYES TWICE A DAY   busPIRone (BUSPAR) 5 mg tablet   No No   Sig: TAKE 1 TABLET BY MOUTH TWICE A DAY   cetirizine (ZyrTEC) 10 mg tablet   No No   Sig: Take 1 tablet (10 mg total) by mouth daily   fluticasone (FLONASE) 50 mcg/act nasal spray   No No   Sig: SPRAY 2 SPRAYS INTO EACH NOSTRIL EVERY DAY   glucose blood (OneTouch Verio) test strip   No No   Sig: Check blood sugars once daily. Please substitute with appropriate alternative as covered by patient's insurance. Dx: E11.65   losartan (COZAAR) 25 mg tablet   No No   Sig: TAKE 1 TABLET (25 MG TOTAL) BY MOUTH DAILY.   meloxicam (Mobic) 15 mg tablet   No No   Sig: Take 1 tablet (15 mg total) by mouth daily   metFORMIN (GLUCOPHAGE-XR) 500 mg 24 hr tablet   No No   Sig: TAKE 2 TABLETS BY MOUTH DAILY WITH DINNER   metoprolol succinate (TOPROL-XL) 50 mg 24 hr tablet   No No   Sig: TAKE 1 TABLET BY MOUTH EVERY DAY   montelukast (SINGULAIR) 10 mg tablet   No No   Sig: Take 1 tablet (10 mg total) by mouth daily at bedtime   nystatin (MYCOSTATIN) powder   No No   Sig: Apply topically 3 (three) times a day   omeprazole (PriLOSEC) 20 mg delayed release capsule   No No   Sig: Take 1 capsule (20 mg total) by mouth daily   rosuvastatin (CRESTOR) 10 MG tablet   No No   Sig: TAKE 1 TABLET BY MOUTH EVERY DAY   tirzepatide (Mounjaro) 2.5 MG/0.5ML   No No   Sig: Inject 0.5 mL (2.5 mg total) under the skin every 7 days   topiramate (TOPAMAX) 25 mg tablet   No No   Sig: TAKE 3 TABLETS (75 MG TOTAL) BY MOUTH DAILY AT BEDTIME       Facility-Administered Medications: None     Patient's Medications   Discharge Prescriptions    No medications on file     No discharge procedures on file.  ED SEPSIS DOCUMENTATION   Time reflects when diagnosis was documented in both MDM as applicable and the Disposition within this note       Time User Action Codes Description Comment    12/16/2024  8:21 PM Antonino Ponce Add [R06.00] Dyspnea     12/16/2024  8:21 PM Antonino Ponce Add [J10.1] Influenza A     12/16/2024  8:22 PM Antonino Ponce Add [R09.02] Hypoxia     12/16/2024  9:38 PM Jose Roberto Aguilar [E87.6] Hypokalemia                  Jose Roberto gAuilar MD  12/16/24 6536

## 2024-12-17 NOTE — ASSESSMENT & PLAN NOTE
Substitute Imitrex 100 mg p.o. once as needed for prehospital Zomig, continue prehospital Elavil 20 mg p.o. nightly and Topamax 75 mg p.o. nightly

## 2024-12-17 NOTE — ASSESSMENT & PLAN NOTE
Initial potassium 2.9  Placed on telemetry  Patient is status post IV and p.o. repletion  Continue to monitor with repeat labs in a.m.

## 2024-12-17 NOTE — PLAN OF CARE
Problem: PAIN - ADULT  Goal: Verbalizes/displays adequate comfort level or baseline comfort level  Description: Interventions:  - Encourage patient to monitor pain and request assistance  - Assess pain using appropriate pain scale  - Administer analgesics based on type and severity of pain and evaluate response  - Implement non-pharmacological measures as appropriate and evaluate response  - Consider cultural and social influences on pain and pain management  - Notify physician/advanced practitioner if interventions unsuccessful or patient reports new pain  Outcome: Progressing     Problem: INFECTION - ADULT  Goal: Absence or prevention of progression during hospitalization  Description: INTERVENTIONS:  - Assess and monitor for signs and symptoms of infection  - Monitor lab/diagnostic results  - Monitor all insertion sites, i.e. indwelling lines, tubes, and drains  - Monitor endotracheal if appropriate and nasal secretions for changes in amount and color  - [place] appropriate cooling/warming therapies per order  - Administer medications as ordered  - Instruct and encourage patient and family to use good hand hygiene technique  - Identify and instruct in appropriate isolation precautions for identified infection/condition  Outcome: Progressing     Problem: SAFETY ADULT  Goal: Patient will remain free of falls  Description: INTERVENTIONS:  - Educate patient/family on patient safety including physical limitations  - Instruct patient to call for assistance with activity   - Consult OT/PT to assist with strengthening/mobility   - Keep Call bell within reach  - Keep bed low and locked with side rails adjusted as appropriate  - Keep care items and personal belongings within reach  - Initiate and maintain comfort rounds  - Make Fall Risk Sign visible to staff  - Obtain necessary fall risk management equipment: non-slip socks  - Apply yellow socks and bracelet for high fall risk patients  - Consider moving patient to  room near nurses station  Outcome: Progressing  Goal: Maintain or return to baseline ADL function  Description: INTERVENTIONS:  -  Assess patient's ability to carry out ADLs; assess patient's baseline for ADL function and identify physical deficits which impact ability to perform ADLs (bathing, care of mouth/teeth, toileting, grooming, dressing, etc.)  - Assess/evaluate cause of self-care deficits   - Assess range of motion  - Assess patient's mobility; develop plan if impaired  - Assess patient's need for assistive devices and provide as appropriate  - Encourage maximum independence but intervene and supervise when necessary  - Involve family in performance of ADLs  - Assess for home care needs following discharge   - Consider OT consult to assist with ADL evaluation and planning for discharge  - Provide patient education as appropriate  Outcome: Progressing  Goal: Maintains/Returns to pre admission functional level  Description: INTERVENTIONS:  - Perform AM-PAC 6 Click Basic Mobility/ Daily Activity assessment daily.  - Set and communicate daily mobility goal to care team and patient/family/caregiver.   - Collaborate with rehabilitation services on mobility goals if consulted  - Perform Range of Motion 3 times a day.  - Reposition patient every 3 hours.  - Ambulate patient 3 times a day  - Out of bed to chair 3 times a day for meals  - Out of bed for toileting  - Record patient progress and toleration of activity level   Outcome: Progressing     Problem: DISCHARGE PLANNING  Goal: Discharge to home or other facility with appropriate resources  Description: INTERVENTIONS:  - Identify barriers to discharge w/patient and caregiver  - Arrange for needed discharge resources and transportation as appropriate  - Identify discharge learning needs (meds, wound care, etc.)  - Arrange for interpretive services to assist at discharge as needed  - Refer to Case Management Department for coordinating discharge planning if the  patient needs post-hospital services based on physician/advanced practitioner order or complex needs related to functional status, cognitive ability, or social support system  Outcome: Progressing     Problem: Knowledge Deficit  Goal: Patient/family/caregiver demonstrates understanding of disease process, treatment plan, medications, and discharge instructions  Description: Complete learning assessment and assess knowledge base.  Interventions:  - Provide teaching at level of understanding  - Provide teaching via preferred learning methods  Outcome: Progressing     Problem: Nutrition/Hydration-ADULT  Goal: Nutrient/Hydration intake appropriate for improving, restoring or maintaining nutritional needs  Description: Monitor and assess patient's nutrition/hydration status for malnutrition. Collaborate with interdisciplinary team and initiate plan and interventions as ordered.  Monitor patient's weight and dietary intake as ordered or per policy. Utilize nutrition screening tool and intervene as necessary. Determine patient's food preferences and provide high-protein, high-caloric foods as appropriate.     INTERVENTIONS:  - Monitor oral intake, urinary output, labs, and treatment plans  - Assess nutrition and hydration status and recommend course of action  - Evaluate amount of meals eaten  - Assist patient with eating if necessary   - Allow adequate time for meals  - Recommend/ encourage appropriate diets, oral nutritional supplements, and vitamin/mineral supplements  - Order, calculate, and assess calorie counts as needed  - Recommend, monitor, and adjust tube feedings and TPN/PPN based on assessed needs  - Assess need for intravenous fluids  - Provide specific nutrition/hydration education as appropriate  - Include patient/family/caregiver in decisions related to nutrition  Outcome: Progressing     Problem: CARDIOVASCULAR - ADULT  Goal: Maintains optimal cardiac output and hemodynamic stability  Description:  INTERVENTIONS:  - Monitor I/O, vital signs and rhythm  - Monitor for S/S and trends of decreased cardiac output  - Administer and titrate ordered vasoactive medications to optimize hemodynamic stability  - Assess quality of pulses, skin color and temperature  - Assess for signs of decreased coronary artery perfusion  - Instruct patient to report change in severity of symptoms  Outcome: Progressing     Problem: RESPIRATORY - ADULT  Goal: Achieves optimal ventilation and oxygenation  Description: INTERVENTIONS:  - Assess for changes in respiratory status  - Assess for changes in mentation and behavior  - Position to facilitate oxygenation and minimize respiratory effort  - Oxygen administered by appropriate delivery if ordered  - Initiate smoking cessation education as indicated  - Encourage broncho-pulmonary hygiene including cough, deep breathe, Incentive Spirometry  - Assess the need for suctioning and aspirate as needed  - Assess and instruct to report SOB or any respiratory difficulty  - Respiratory Therapy support as indicated  Outcome: Progressing     Problem: METABOLIC, FLUID AND ELECTROLYTES - ADULT  Goal: Glucose maintained within target range  Description: INTERVENTIONS:  - Monitor Blood Glucose as ordered  - Assess for signs and symptoms of hyperglycemia and hypoglycemia  - Administer ordered medications to maintain glucose within target range  - Assess nutritional intake and initiate nutrition service referral as needed  Outcome: Progressing

## 2024-12-17 NOTE — ASSESSMENT & PLAN NOTE
Patient began with symptoms 8 days ago  Send positive for flu A last evening  Patient is not a Tamiflu candidate  Supportive care

## 2024-12-17 NOTE — ASSESSMENT & PLAN NOTE
Placed in observation medicine  Trend lactic acid and procalcitonin  Patient received sepsis protocol IV fluid resuscitation in ER  Cultures are currently pending  Obtain urine for strep pneumo and Legionella  Placed on O2 and respiratory protocol  Give antipyretics and antitussives as needed  Give Levaquin 750 mg IV daily  Patient met sepsis criteria and that she was tachycardic with heart rate as high as 105, tachypneic with respiratory rate as high as 24 CT findings suspicious for atypical pneumonia

## 2024-12-18 LAB
ANION GAP SERPL CALCULATED.3IONS-SCNC: 9 MMOL/L (ref 4–13)
BASOPHILS # BLD AUTO: 0.02 THOUSANDS/ΜL (ref 0–0.1)
BASOPHILS NFR BLD AUTO: 0 % (ref 0–1)
BUN SERPL-MCNC: 11 MG/DL (ref 5–25)
CALCIUM SERPL-MCNC: 8.6 MG/DL (ref 8.4–10.2)
CHLORIDE SERPL-SCNC: 102 MMOL/L (ref 96–108)
CO2 SERPL-SCNC: 24 MMOL/L (ref 21–32)
CREAT SERPL-MCNC: 0.51 MG/DL (ref 0.6–1.3)
EOSINOPHIL # BLD AUTO: 0.03 THOUSAND/ΜL (ref 0–0.61)
EOSINOPHIL NFR BLD AUTO: 0 % (ref 0–6)
ERYTHROCYTE [DISTWIDTH] IN BLOOD BY AUTOMATED COUNT: 17 % (ref 11.6–15.1)
GFR SERPL CREATININE-BSD FRML MDRD: 114 ML/MIN/1.73SQ M
GLUCOSE P FAST SERPL-MCNC: 217 MG/DL (ref 65–99)
GLUCOSE SERPL-MCNC: 217 MG/DL (ref 65–140)
GLUCOSE SERPL-MCNC: 242 MG/DL (ref 65–140)
GLUCOSE SERPL-MCNC: 265 MG/DL (ref 65–140)
GLUCOSE SERPL-MCNC: 287 MG/DL (ref 65–140)
GLUCOSE SERPL-MCNC: 297 MG/DL (ref 65–140)
HCT VFR BLD AUTO: 32.3 % (ref 34.8–46.1)
HGB BLD-MCNC: 9.6 G/DL (ref 11.5–15.4)
IMM GRANULOCYTES # BLD AUTO: 0.21 THOUSAND/UL (ref 0–0.2)
IMM GRANULOCYTES NFR BLD AUTO: 2 % (ref 0–2)
LYMPHOCYTES # BLD AUTO: 1.32 THOUSANDS/ΜL (ref 0.6–4.47)
LYMPHOCYTES NFR BLD AUTO: 10 % (ref 14–44)
MCH RBC QN AUTO: 22.9 PG (ref 26.8–34.3)
MCHC RBC AUTO-ENTMCNC: 29.7 G/DL (ref 31.4–37.4)
MCV RBC AUTO: 77 FL (ref 82–98)
MONOCYTES # BLD AUTO: 0.36 THOUSAND/ΜL (ref 0.17–1.22)
MONOCYTES NFR BLD AUTO: 3 % (ref 4–12)
NEUTROPHILS # BLD AUTO: 10.87 THOUSANDS/ΜL (ref 1.85–7.62)
NEUTS SEG NFR BLD AUTO: 85 % (ref 43–75)
NRBC BLD AUTO-RTO: 0 /100 WBCS
PLATELET # BLD AUTO: 396 THOUSANDS/UL (ref 149–390)
PMV BLD AUTO: 9.2 FL (ref 8.9–12.7)
POTASSIUM SERPL-SCNC: 3.7 MMOL/L (ref 3.5–5.3)
PROCALCITONIN SERPL-MCNC: 0.09 NG/ML
RBC # BLD AUTO: 4.2 MILLION/UL (ref 3.81–5.12)
SODIUM SERPL-SCNC: 135 MMOL/L (ref 135–147)
WBC # BLD AUTO: 12.81 THOUSAND/UL (ref 4.31–10.16)

## 2024-12-18 PROCEDURE — 80048 BASIC METABOLIC PNL TOTAL CA: CPT | Performed by: HOSPITALIST

## 2024-12-18 PROCEDURE — 94640 AIRWAY INHALATION TREATMENT: CPT

## 2024-12-18 PROCEDURE — 99232 SBSQ HOSP IP/OBS MODERATE 35: CPT | Performed by: HOSPITALIST

## 2024-12-18 PROCEDURE — 84145 PROCALCITONIN (PCT): CPT | Performed by: PHYSICIAN ASSISTANT

## 2024-12-18 PROCEDURE — 94760 N-INVAS EAR/PLS OXIMETRY 1: CPT

## 2024-12-18 PROCEDURE — 85025 COMPLETE CBC W/AUTO DIFF WBC: CPT | Performed by: HOSPITALIST

## 2024-12-18 PROCEDURE — 94664 DEMO&/EVAL PT USE INHALER: CPT

## 2024-12-18 PROCEDURE — 82948 REAGENT STRIP/BLOOD GLUCOSE: CPT

## 2024-12-18 RX ORDER — POLYETHYLENE GLYCOL 3350 17 G/17G
17 POWDER, FOR SOLUTION ORAL ONCE
Status: COMPLETED | OUTPATIENT
Start: 2024-12-18 | End: 2024-12-18

## 2024-12-18 RX ORDER — LEVOFLOXACIN 750 MG/1
750 TABLET, FILM COATED ORAL EVERY 24 HOURS
Status: DISCONTINUED | OUTPATIENT
Start: 2024-12-18 | End: 2024-12-19 | Stop reason: HOSPADM

## 2024-12-18 RX ORDER — METHYLPREDNISOLONE SODIUM SUCCINATE 40 MG/ML
40 INJECTION, POWDER, LYOPHILIZED, FOR SOLUTION INTRAMUSCULAR; INTRAVENOUS EVERY 8 HOURS SCHEDULED
Status: DISCONTINUED | OUTPATIENT
Start: 2024-12-18 | End: 2024-12-19

## 2024-12-18 RX ORDER — INSULIN GLARGINE 100 [IU]/ML
15 INJECTION, SOLUTION SUBCUTANEOUS
Status: DISCONTINUED | OUTPATIENT
Start: 2024-12-18 | End: 2024-12-19 | Stop reason: HOSPADM

## 2024-12-18 RX ADMIN — PANTOPRAZOLE SODIUM 40 MG: 40 TABLET, DELAYED RELEASE ORAL at 05:36

## 2024-12-18 RX ADMIN — LEVALBUTEROL HYDROCHLORIDE 1.25 MG: 1.25 SOLUTION RESPIRATORY (INHALATION) at 13:02

## 2024-12-18 RX ADMIN — PRAVASTATIN SODIUM 80 MG: 40 TABLET ORAL at 17:14

## 2024-12-18 RX ADMIN — METHYLPREDNISOLONE SODIUM SUCCINATE 40 MG: 40 INJECTION, POWDER, FOR SOLUTION INTRAMUSCULAR; INTRAVENOUS at 05:36

## 2024-12-18 RX ADMIN — HEPARIN SODIUM 5000 UNITS: 5000 INJECTION, SOLUTION INTRAVENOUS; SUBCUTANEOUS at 22:01

## 2024-12-18 RX ADMIN — LEVOFLOXACIN 750 MG: 750 TABLET, FILM COATED ORAL at 22:01

## 2024-12-18 RX ADMIN — NYSTATIN: 100000 POWDER TOPICAL at 17:14

## 2024-12-18 RX ADMIN — MELOXICAM 15 MG: 7.5 TABLET ORAL at 08:08

## 2024-12-18 RX ADMIN — METHYLPREDNISOLONE SODIUM SUCCINATE 40 MG: 40 INJECTION, POWDER, FOR SOLUTION INTRAMUSCULAR; INTRAVENOUS at 01:19

## 2024-12-18 RX ADMIN — MONTELUKAST 10 MG: 10 TABLET, FILM COATED ORAL at 22:01

## 2024-12-18 RX ADMIN — INSULIN LISPRO 4 UNITS: 100 INJECTION, SOLUTION INTRAVENOUS; SUBCUTANEOUS at 11:49

## 2024-12-18 RX ADMIN — METHYLPREDNISOLONE SODIUM SUCCINATE 40 MG: 40 INJECTION, POWDER, FOR SOLUTION INTRAMUSCULAR; INTRAVENOUS at 22:01

## 2024-12-18 RX ADMIN — INSULIN LISPRO 3 UNITS: 100 INJECTION, SOLUTION INTRAVENOUS; SUBCUTANEOUS at 17:14

## 2024-12-18 RX ADMIN — FLUTICASONE PROPIONATE 2 SPRAY: 50 SPRAY, METERED NASAL at 08:10

## 2024-12-18 RX ADMIN — TOPIRAMATE 75 MG: 25 TABLET, FILM COATED ORAL at 22:01

## 2024-12-18 RX ADMIN — Medication 1000 UNITS: at 08:08

## 2024-12-18 RX ADMIN — INSULIN LISPRO 3 UNITS: 100 INJECTION, SOLUTION INTRAVENOUS; SUBCUTANEOUS at 08:06

## 2024-12-18 RX ADMIN — LEVALBUTEROL HYDROCHLORIDE 1.25 MG: 1.25 SOLUTION RESPIRATORY (INHALATION) at 20:15

## 2024-12-18 RX ADMIN — METHYLPREDNISOLONE SODIUM SUCCINATE 40 MG: 40 INJECTION, POWDER, FOR SOLUTION INTRAMUSCULAR; INTRAVENOUS at 14:37

## 2024-12-18 RX ADMIN — BUSPIRONE HYDROCHLORIDE 5 MG: 5 TABLET ORAL at 08:08

## 2024-12-18 RX ADMIN — NYSTATIN: 100000 POWDER TOPICAL at 22:03

## 2024-12-18 RX ADMIN — INSULIN GLARGINE 15 UNITS: 100 INJECTION, SOLUTION SUBCUTANEOUS at 22:02

## 2024-12-18 RX ADMIN — FLUTICASONE FUROATE AND VILANTEROL TRIFENATATE 1 PUFF: 200; 25 POWDER RESPIRATORY (INHALATION) at 08:09

## 2024-12-18 RX ADMIN — POLYETHYLENE GLYCOL 3350 17 G: 17 POWDER, FOR SOLUTION ORAL at 22:32

## 2024-12-18 RX ADMIN — AMITRIPTYLINE HYDROCHLORIDE 20 MG: 10 TABLET, FILM COATED ORAL at 22:32

## 2024-12-18 RX ADMIN — NICOTINE 1 PATCH: 21 PATCH, EXTENDED RELEASE TRANSDERMAL at 08:10

## 2024-12-18 RX ADMIN — HEPARIN SODIUM 5000 UNITS: 5000 INJECTION, SOLUTION INTRAVENOUS; SUBCUTANEOUS at 05:37

## 2024-12-18 RX ADMIN — INSULIN LISPRO 4 UNITS: 100 INJECTION, SOLUTION INTRAVENOUS; SUBCUTANEOUS at 22:01

## 2024-12-18 RX ADMIN — NYSTATIN: 100000 POWDER TOPICAL at 08:10

## 2024-12-18 RX ADMIN — METOPROLOL SUCCINATE 50 MG: 50 TABLET, EXTENDED RELEASE ORAL at 08:08

## 2024-12-18 RX ADMIN — LEVOFLOXACIN 750 MG: 750 INJECTION, SOLUTION INTRAVENOUS at 01:20

## 2024-12-18 RX ADMIN — LORATADINE 10 MG: 10 TABLET ORAL at 08:08

## 2024-12-18 RX ADMIN — LEVALBUTEROL HYDROCHLORIDE 1.25 MG: 1.25 SOLUTION RESPIRATORY (INHALATION) at 07:14

## 2024-12-18 RX ADMIN — HEPARIN SODIUM 5000 UNITS: 5000 INJECTION, SOLUTION INTRAVENOUS; SUBCUTANEOUS at 14:39

## 2024-12-18 RX ADMIN — BUSPIRONE HYDROCHLORIDE 5 MG: 5 TABLET ORAL at 17:14

## 2024-12-18 RX ADMIN — LOSARTAN POTASSIUM 25 MG: 25 TABLET, FILM COATED ORAL at 08:08

## 2024-12-18 NOTE — UTILIZATION REVIEW
Initial Clinical Review    WAS OBSERVATION 12/16/24 CONVERTED TO INPATIENT ADMISSION 12/18/24 DUE TO CONTINUED STAY REQUIRED TO CARE FOR PATIENT WITH SEPSIS D/T PNEUMONIA        Admission: Date/Time/Statement:   Admission Orders (From admission, onward)       Ordered        12/18/24 1051  INPATIENT ADMISSION  Once            12/16/24 2138  Place in Observation  Once                          Orders Placed This Encounter   Procedures                          INPATIENT ADMISSION     Standing Status:   Standing     Number of Occurrences:   1     Level of Care:   Med Surg [16]     Estimated length of stay:   More than 2 Midnights     Certification:   I certify that inpatient services are medically necessary for this patient for a duration of greater than two midnights. See H&P and MD Progress Notes for additional information about the patient's course of treatment.     ED Arrival Information       Expected   -    Arrival   12/16/2024 18:33    Acuity   Emergent              Means of arrival   Walk-In    Escorted by   Spouse    Service   Hospitalist    Admission type   Emergency              Arrival complaint   cough sob vomiting side pains light headed             Chief Complaint   Patient presents with    Shortness of Breath     Sob, cough, dry heaving, no appetite, over the last week; states oxygen was at 84% a couple of days ago but did not want to come in       12/16/24  Initial Presentation: 49 y.o. female presents to the ED for further evaluation and treatment of her 8-day history of cough which is nonproductive accompanied with some congestion, wheezing and dyspnea at rest. Patient states that her home O2 saturation was as low as 83% last week but she did not come into the ER at that time. Patient denies any recent sick contacts, no fever or chills. Pt states that she used her home nebulizer multiple times and states that she was using it every 3-4 hours with relief to start with and states that it was ineffective  for the past day.  Patient does continue to smoke.While in ER patient was noted to be hypoxic with an O2 saturation as low as 87% was subsequently placed on O2 2 L nasal cannula maintain O2 saturation of 90% Exam: + sob , + rhonchi in lungs,  , RR 24, /89. .PMH: asthma, non-insulin-dependent diabetes   Abnormal labs/imaging:  Elevated D dimer 0.81, WBC 10.40, HGB 11.4, creat 0.54, ALT 80 K 2.9, Tested + influenza A and Meets Sepsis criteria  for tachycardia and hypoxia. CT chest /chest xray reveals findings suspicious for pneumonia. In the ED pt given IVF's of 2 liters of Plasmalyte, IV azithromycin, IV KCL x1 dose of 20 meq and PO dose of 20 Meq. Plan: admit to observation status for Pneumonia/ Acute respiratory failure, supplemental oxygen use, IV steroids, Repeat CBC, BMP tomorrow, IV ABX, Accu checks, SSI PRN.     12/17/24 Hospitalist: Exam improved breathing .still requires 2 liter's oxygen, Spo2 90%. + increased Work of breathing.Blood pressure 155/88, pulse 105, temperature 98.6 °F (37 °C), resp. rate 20  Plan: Continue IV steroids Q6 , IV Levaquin daily, repeat labs BMP in AM, pending urine and blood cultures, Xopenex/Atrovent Nebulizers TID, contineu supplemental oxygen.     Anticipated Length of Stay/Certification Statement: Patient will be admitted on an observation basis with an anticipated length of stay of less than 2 midnights secondary to sepsis pneumonia requiring IV antibiotics, acute respiratory insufficiency requiring O2 support and IV steroids.     CONVERTED TO IP 12/18/24 .    12/18/24 HOSPITALIST: Exam: Sepsis parameters have significantly improved, patient remains intermittently tachycardic but otherwise is afebrile, no longer tachypneic and has a mild steroid-induced leukocytosis now sitting up on the side of her bed, reports feeling better, however states that she does not feel that she is ready to go home because she still feels short of breath with exertion and still requiring  supplemental oxygen/1 liter SPO2 91%. /72, HR 98, RR 20, Temp 97.4 F. Tight air entry throughout . K 3.7 today. WBC 12.81 elevated from 10.40 on 12/16 , on IV steroids. Plan: Decrease Solu-Medrol dosing from every IV 40 mg 6 hours to every 8 hours, Continue Breo, Xopenex TID nebulizers and Claritin . Wean oxygen as able, transition to PO levaquin after todays IV dose.     Certification Statement: Patient is still requiring supplemental oxygen, since her clinical improvement is slower than expected, I will switch her from the observation to the inpatient classification since she will be on the hospital greater than 2 midnights for continued antibiotics, steroids, and supplemental oxygen     ED Treatment-Medication Administration from 12/16/2024 1833 to 12/16/2024 2236         Date/Time Order Dose Route Action     12/16/2024 1932 multi-electrolyte (PLASMALYTE-A/ISOLYTE-S PH 7.4) IV solution 1,000 mL 1,000 mL Intravenous New Bag     12/16/2024 2036 multi-electrolyte (PLASMALYTE-A/ISOLYTE-S PH 7.4) IV solution 1,000 mL 1,000 mL Intravenous New Bag     12/16/2024 2204 azithromycin (ZITHROMAX) 500 mg in sodium chloride 0.9 % 250 mL IVPB 500 mg Intravenous New Bag     12/16/2024 2014 iohexol (OMNIPAQUE) 350 MG/ML injection (MULTI-DOSE) 85 mL 85 mL Intravenous Given     12/16/2024 2152 potassium chloride (Klor-Con M20) CR tablet 40 mEq 40 mEq Oral Given     12/16/2024 2154 potassium chloride 20 mEq IVPB (premix) 20 mEq Intravenous New Bag            Scheduled Medications:  amitriptyline, 20 mg, Oral, HS  busPIRone, 5 mg, Oral, BID  Cholecalciferol, 1,000 Units, Oral, Daily  fluticasone, 2 spray, Each Nare, Daily  fluticasone-vilanterol, 1 puff, Inhalation, Daily  heparin (porcine), 5,000 Units, Subcutaneous, Q8H GATO  insulin glargine, 15 Units, Subcutaneous, HS  insulin lispro, 1-6 Units, Subcutaneous, TID AC  insulin lispro, 1-6 Units, Subcutaneous, HS  levalbuterol, 1.25 mg, Nebulization, TID  levofloxacin, 750 mg,  Oral, Q24H  loratadine, 10 mg, Oral, Daily  losartan, 25 mg, Oral, Daily  meloxicam, 15 mg, Oral, Daily  methylPREDNISolone sodium succinate, 40 mg, Intravenous, Q8H GATO  metoprolol succinate, 50 mg, Oral, Daily  montelukast, 10 mg, Oral, HS  nicotine, 1 patch, Transdermal, Daily  nystatin, , Topical, TID  pantoprazole, 40 mg, Oral, Early Morning  pravastatin, 80 mg, Oral, Daily With Dinner  topiramate, 75 mg, Oral, HS      Continuous IV Infusions: none      PRN Meds:  acetaminophen, 650 mg, Oral, Q6H PRN  albuterol, 2 puff, Inhalation, Q4H PRN  HYDROcodone Bit-Homatrop MBr, 5 mL, Oral, Q4H PRN  ondansetron, 4 mg, Intravenous, Q6H PRN  SUMAtriptan, 100 mg, Oral, Once PRN      ED Triage Vitals   Temperature Pulse Respirations Blood Pressure SpO2 Pain Score   12/16/24 1859 12/16/24 1858 12/16/24 1858 12/16/24 1858 12/16/24 1858 12/17/24 0032   98.4 °F (36.9 °C) 105 (!) 24 163/89 (!) 87 % 6     Weight (last 2 days)       None            Vital Signs (last 3 days)       Date/Time Temp Pulse Resp BP MAP (mmHg) SpO2 Calculated FIO2 (%) - Nasal Cannula Nasal Cannula O2 Flow Rate (L/min) O2 Device Patient Position - Orthostatic VS [place] Coma Scale Score Pain    12/18/24 1302 -- -- -- -- -- -- 22 0.5 L/min Nasal cannula -- -- --    12/18/24 0817 -- -- -- -- -- -- -- -- -- -- 15 4    12/18/24 07:44:26 97.4 °F (36.3 °C) 98 20 127/72 90 91 % -- -- -- Sitting -- --    12/18/24 0714 -- -- -- -- -- 94 % 24 1 L/min Nasal cannula -- -- --    12/18/24 0300 97.5 °F (36.4 °C) 76 18 135/82 100 93 % -- -- Nasal cannula Lying -- --    12/18/24 0224 -- -- -- -- -- -- -- -- -- -- 15 No Pain    12/17/24 23:45:39 98.1 °F (36.7 °C) 84 18 137/81 100 91 % -- -- -- -- -- --    12/17/24 2047 -- -- -- -- -- -- 24 1 L/min Nasal cannula -- -- --    12/17/24 19:30:10 97.4 °F (36.3 °C) 92 18 142/86 105 92 % -- -- -- -- -- --    12/17/24 1746 -- -- -- -- -- 93 % 24 1 L/min Nasal cannula -- -- --    12/17/24 15:16:16 98.2 °F (36.8 °C) 97 -- -- -- 90 %  -- -- -- -- -- --    12/17/24 14:24:34 -- 98 20 139/79 99 92 % 28 2 L/min Nasal cannula -- -- --    12/17/24 1308 -- -- -- -- -- -- 28 2 L/min Nasal cannula -- -- --    12/17/24 1238 -- -- -- -- -- 89 % 28 2 L/min Nasal cannula -- -- --    12/17/24 11:11:18 -- 106 -- 139/75 96 90 % -- -- -- -- -- --    12/17/24 0900 -- -- -- -- -- 91 % 28 2 L/min -- -- 15 --    12/17/24 0853 -- -- -- -- -- -- -- -- -- -- -- 5    12/17/24 0740 -- -- -- -- -- 92 % 28 2 L/min Nasal cannula -- -- --    12/17/24 07:30:50 97.7 °F (36.5 °C) 100 -- 136/90 105 -- -- -- -- -- -- --    12/17/24 0057 -- -- -- -- -- -- 28 2 L/min Nasal cannula -- -- --    12/17/24 0032 -- -- -- -- -- -- -- -- -- -- 15 6    12/16/24 2341 -- -- 20 -- -- -- 28 2 L/min  Nasal cannula  -- -- --    12/16/24 22:36:58 98.6 °F (37 °C) 105 18 155/88 110 90 % -- -- -- -- -- --    12/16/24 2045 -- 97 20 171/82 118 90 % 28 2 L/min Nasal cannula Sitting -- --    12/16/24 1955 -- -- -- -- -- -- -- -- Nasal cannula -- 15 --    12/16/24 1859 98.4 °F (36.9 °C) -- -- -- -- 91 % 32 3 L/min Nasal cannula -- -- --    12/16/24 1858 -- 105 24 163/89 -- 87 % -- -- None (Room air) -- -- --              Pertinent Labs/Diagnostic Test Results:   Radiology:  CTA chest pe study   Final Interpretation by Jin Noguera MD (12/16 2112)         1. Groundglass opacities throughout the lungs may represent acute pneumonitis or atypical pneumonia.   2. No evidence of acute pulmonary embolus.                  Workstation performed: [id number]         XR chest 1 view portable   ED Interpretation by Antonino Ponce III, DO (12/16 1949)   Significantly underpenetrated portable chest x-ray, possible PTA on R side.      Final Interpretation by Ming Loya MD (12/17 0601)      Lingular pneumonia and bilateral bronchiolitis.            Workstation performed: [id number]           Cardiology:  ECG 12 lead   Final Result by Jason Cardenas DO (12/17 1680)   Sinus tachycardia   Nonspecific ST  abnormality   Abnormal ECG   When compared with ECG of 21-Feb-2024 06:29,   Vent. rate has increased by  37 bpm      Confirmed by Jason Cardenas ([id number]) on 12/17/2024 5:32:01 AM          Results from last 7 days   Lab Units 12/16/24 1913   SARS-COV-2  Negative     Results from last 7 days   Lab Units 12/18/24  0535 12/17/24  0645 12/16/24 1913   WBC Thousand/uL 12.81* 8.35 10.40*   HEMOGLOBIN g/dL 9.6* 10.5* 11.4*   HEMATOCRIT % 32.3* 34.2* 37.9   PLATELETS Thousands/uL 396* 342 376   TOTAL NEUT ABS Thousands/µL 10.87*  --   --    BANDS PCT %  --   --  2         Results from last 7 days   Lab Units 12/18/24  0535 12/17/24  0645 12/16/24 1913   SODIUM mmol/L 135 136 136   POTASSIUM mmol/L 3.7 3.7 2.9*   CHLORIDE mmol/L 102 102 97   CO2 mmol/L 24 26 28   ANION GAP mmol/L 9 8 11   BUN mg/dL 11 6 6   CREATININE mg/dL 0.51* 0.45* 0.54*   EGFR ml/min/1.73sq m 114 119 112   CALCIUM mg/dL 8.6 8.4 9.0     Results from last 7 days   Lab Units 12/16/24 1913   AST U/L 28   ALT U/L 80*   ALK PHOS U/L 79   TOTAL PROTEIN g/dL 7.5   ALBUMIN g/dL 3.7   TOTAL BILIRUBIN mg/dL 0.53     Results from last 7 days   Lab Units 12/18/24  1104 12/18/24  0743 12/17/24  2109 12/17/24  1617 12/17/24  1111 12/17/24  0730 12/17/24  0010   POC GLUCOSE mg/dl 297* 242* 257* 240* 228* 175* 109     Results from last 7 days   Lab Units 12/18/24  0535 12/17/24  0645 12/16/24 1913   GLUCOSE RANDOM mg/dL 217* 200* 136       Results from last 7 days   Lab Units 12/16/24 1913   HS TNI 0HR ng/L <2     Results from last 7 days   Lab Units 12/16/24 1913   D-DIMER QUANTITATIVE ug/ml FEU 0.81*     Results from last 7 days   Lab Units 12/16/24 1913   PROTIME seconds 14.1   INR  1.04   PTT seconds 36*         Results from last 7 days   Lab Units 12/18/24  0535 12/16/24 1913   PROCALCITONIN ng/ml 0.09 0.08     Results from last 7 days   Lab Units 12/16/24  1938   LACTIC ACID mmol/L 1.3             Results from last 7 days   Lab Units 12/16/24 1913   BNP  pg/mL 60           Results from last 7 days   Lab Units 12/17/24  0735 12/17/24  0733 12/16/24  1913   STREP PNEUMONIAE ANTIGEN, URINE  Negative  --   --    LEGIONELLA URINARY ANTIGEN   --  Negative  --    INFLUENZA A PCR   --   --  Positive*   INFLUENZA B PCR   --   --  Negative   RSV PCR   --   --  Negative           Results from last 7 days   Lab Units 12/16/24  1938   BLOOD CULTURE  No Growth at 24 hrs.  No Growth at 24 hrs.           Past Medical History:   Diagnosis Date    Allergic     Asthma     Diabetes mellitus (HCC)     Hypertension     Migraines      Present on Admission:   Morbid obesity (HCC)   Hyperlipidemia   Migraine without status migrainosus, not intractable   Type 2 diabetes mellitus without complication, without long-term current use of insulin (HCC)   Moderate persistent asthma without complication   Gastroesophageal reflux disease   Anxiety   Sepsis due to pneumonia ([facility])   Hypokalemia   Influenza A   Acute respiratory insufficiency      Admitting Diagnosis: Hypokalemia [E87.6]  Dyspnea [R06.00]  SOB (shortness of breath) [R06.02]  Influenza A [J10.1]  Hypoxia [R09.02]  Age/Sex: 49 y.o. female    Network Utilization Review Department  ATTENTION: Please call with any questions or concerns to [phone] and carefully listen to the prompts so that you are directed to the right person. All voicemails are confidential.   For Discharge needs, contact Care Management DC Support Team at [phone] opt. [phone]  Send all requests for admission clinical reviews, approved or denied determinations and any other requests to dedicated fax number below belonging to the campus where the patient is receiving treatment. List of dedicated fax numbers for the Facilities:  FACILITY NAME UR FAX NUMBER   ADMISSION DENIALS (Administrative/Medical Necessity) [phone]   DISCHARGE SUPPORT TEAM (NETWORK) [phone]   PARENT CHILD HEALTH (Maternity/NICU/Pediatrics) [phone]   Count includes the [facility]  [place] [phone]   [facility] [phone]   [facility] [phone]   [facility] [phone]   [facility] [phone]   [facility] [phone]   [facility] [phone]   [facility] [phone]   [facility] [phone]   [facility] [phone]   [facility] [phone]   [facility] [phone]

## 2024-12-18 NOTE — ASSESSMENT & PLAN NOTE
With an acute exacerbation secondary to influenza A positivity, and the pneumonia as outlined above  Decrease Solu-Medrol dosing from every 6 hours to every 8 hours  Continue Breo, Xopenex and Claritin  Hopeful transition to prednisone in the a.m.  Continue respiratory protocol  Continue to wean oxygen as able

## 2024-12-18 NOTE — PLAN OF CARE
Problem: PAIN - ADULT  Goal: Verbalizes/displays adequate comfort level or baseline comfort level  Description: Interventions:  - Encourage patient to monitor pain and request assistance  - Assess pain using appropriate pain scale  - Administer analgesics based on type and severity of pain and evaluate response  - Implement non-pharmacological measures as appropriate and evaluate response  - Consider cultural and social influences on pain and pain management  - Notify physician/advanced practitioner if interventions unsuccessful or patient reports new pain  Outcome: Progressing     Problem: INFECTION - ADULT  Goal: Absence or prevention of progression during hospitalization  Description: INTERVENTIONS:  - Assess and monitor for signs and symptoms of infection  - Monitor lab/diagnostic results  - Monitor all insertion sites, i.e. indwelling lines, tubes, and drains  - Monitor endotracheal if appropriate and nasal secretions for changes in amount and color  - [place] appropriate cooling/warming therapies per order  - Administer medications as ordered  - Instruct and encourage patient and family to use good hand hygiene technique  - Identify and instruct in appropriate isolation precautions for identified infection/condition  Outcome: Progressing     Problem: SAFETY ADULT  Goal: Patient will remain free of falls  Description: INTERVENTIONS:  - Educate patient/family on patient safety including physical limitations  - Instruct patient to call for assistance with activity   - Consult OT/PT to assist with strengthening/mobility   - Keep Call bell within reach  - Keep bed low and locked with side rails adjusted as appropriate  - Keep care items and personal belongings within reach  - Initiate and maintain comfort rounds  - Make Fall Risk Sign visible to staff  - Obtain necessary fall risk management equipment: non-slip socks  - Apply yellow socks and bracelet for high fall risk patients  - Consider moving patient to  room near nurses station  Outcome: Progressing  Goal: Maintain or return to baseline ADL function  Description: INTERVENTIONS:  -  Assess patient's ability to carry out ADLs; assess patient's baseline for ADL function and identify physical deficits which impact ability to perform ADLs (bathing, care of mouth/teeth, toileting, grooming, dressing, etc.)  - Assess/evaluate cause of self-care deficits   - Assess range of motion  - Assess patient's mobility; develop plan if impaired  - Assess patient's need for assistive devices and provide as appropriate  - Encourage maximum independence but intervene and supervise when necessary  - Involve family in performance of ADLs  - Assess for home care needs following discharge   - Consider OT consult to assist with ADL evaluation and planning for discharge  - Provide patient education as appropriate  Outcome: Progressing  Goal: Maintains/Returns to pre admission functional level  Description: INTERVENTIONS:  - Perform AM-PAC 6 Click Basic Mobility/ Daily Activity assessment daily.  - Set and communicate daily mobility goal to care team and patient/family/caregiver.   - Collaborate with rehabilitation services on mobility goals if consulted  - Perform Range of Motion 3 times a day.  - Reposition patient every 3 hours.  - Ambulate patient 3 times a day  - Out of bed to chair 3 times a day for meals  - Out of bed for toileting  - Record patient progress and toleration of activity level   Outcome: Progressing     Problem: DISCHARGE PLANNING  Goal: Discharge to home or other facility with appropriate resources  Description: INTERVENTIONS:  - Identify barriers to discharge w/patient and caregiver  - Arrange for needed discharge resources and transportation as appropriate  - Identify discharge learning needs (meds, wound care, etc.)  - Arrange for interpretive services to assist at discharge as needed  - Refer to Case Management Department for coordinating discharge planning if the  patient needs post-hospital services based on physician/advanced practitioner order or complex needs related to functional status, cognitive ability, or social support system  Outcome: Progressing     Problem: Knowledge Deficit  Goal: Patient/family/caregiver demonstrates understanding of disease process, treatment plan, medications, and discharge instructions  Description: Complete learning assessment and assess knowledge base.  Interventions:  - Provide teaching at level of understanding  - Provide teaching via preferred learning methods  Outcome: Progressing     Problem: Nutrition/Hydration-ADULT  Goal: Nutrient/Hydration intake appropriate for improving, restoring or maintaining nutritional needs  Description: Monitor and assess patient's nutrition/hydration status for malnutrition. Collaborate with interdisciplinary team and initiate plan and interventions as ordered.  Monitor patient's weight and dietary intake as ordered or per policy. Utilize nutrition screening tool and intervene as necessary. Determine patient's food preferences and provide high-protein, high-caloric foods as appropriate.     INTERVENTIONS:  - Monitor oral intake, urinary output, labs, and treatment plans  - Assess nutrition and hydration status and recommend course of action  - Evaluate amount of meals eaten  - Assist patient with eating if necessary   - Allow adequate time for meals  - Recommend/ encourage appropriate diets, oral nutritional supplements, and vitamin/mineral supplements  - Order, calculate, and assess calorie counts as needed  - Recommend, monitor, and adjust tube feedings and TPN/PPN based on assessed needs  - Assess need for intravenous fluids  - Provide specific nutrition/hydration education as appropriate  - Include patient/family/caregiver in decisions related to nutrition  Outcome: Progressing     Problem: CARDIOVASCULAR - ADULT  Goal: Maintains optimal cardiac output and hemodynamic stability  Description:  INTERVENTIONS:  - Monitor I/O, vital signs and rhythm  - Monitor for S/S and trends of decreased cardiac output  - Administer and titrate ordered vasoactive medications to optimize hemodynamic stability  - Assess quality of pulses, skin color and temperature  - Assess for signs of decreased coronary artery perfusion  - Instruct patient to report change in severity of symptoms  Outcome: Progressing     Problem: RESPIRATORY - ADULT  Goal: Achieves optimal ventilation and oxygenation  Description: INTERVENTIONS:  - Assess for changes in respiratory status  - Assess for changes in mentation and behavior  - Position to facilitate oxygenation and minimize respiratory effort  - Oxygen administered by appropriate delivery if ordered  - Initiate smoking cessation education as indicated  - Encourage broncho-pulmonary hygiene including cough, deep breathe, Incentive Spirometry  - Assess the need for suctioning and aspirate as needed  - Assess and instruct to report SOB or any respiratory difficulty  - Respiratory Therapy support as indicated  Outcome: Progressing     Problem: METABOLIC, FLUID AND ELECTROLYTES - ADULT  Goal: Glucose maintained within target range  Description: INTERVENTIONS:  - Monitor Blood Glucose as ordered  - Assess for signs and symptoms of hyperglycemia and hypoglycemia  - Administer ordered medications to maintain glucose within target range  - Assess nutritional intake and initiate nutrition service referral as needed  Outcome: Progressing

## 2024-12-18 NOTE — ASSESSMENT & PLAN NOTE
Lab Results   Component Value Date    HGBA1C 8.5 (A) 10/03/2024       Recent Labs     12/17/24  1617 12/17/24  2109 12/18/24  0743 12/18/24  1104   POCGLU 240* 257* 242* 297*       Blood Sugar Average: Last 72 hrs:  (P) [id number]  Oral hypoglycemics remain on hold  Glucose control complicated by IV steroid therapy  Will add basal Lantus  Continue Accu-Cheks before meals and at bedtime with sliding scale coverage otherwise

## 2024-12-18 NOTE — ASSESSMENT & PLAN NOTE
Patient had symptom onset approximately 9 to 10 days ago  Not a candidate for Tamiflu  Continue supportive therapy

## 2024-12-18 NOTE — ASSESSMENT & PLAN NOTE
Patient initially met sepsis criteria by being tachypneic, and tachycardic  Sepsis parameters have significantly improved, patient remains intermittently tachycardic but otherwise is afebrile, no longer tachypneic and has a mild steroid-induced leukocytosis now  CTA chest PE study-1. Groundglass opacities throughout the lungs may represent acute pneumonitis or atypical pneumonia.   Testing for COVID-19, RSV, and influenza B is negative  Testing for influenza A is positive  Blood cultures x 2 sets-no growth at 24 hours  Urine testing for both Streptococcus pneumonia and Legionella antigen is negative  Continue Levaquin day 3  Patient is still requiring supplemental oxygen, since her clinical improvement is slower than expected, I will switch her from the observation to the inpatient classification since she will be on the hospital greater than 2 midnights for continued antibiotics, steroids, and supplemental oxygen  Hopeful discharge planning in 24 hours

## 2024-12-18 NOTE — PROGRESS NOTES
The levofloxacin has / have been converted to Oral per [facility] IV-to-PO Auto-Conversion Protocol for Adults as approved by the Pharmacy and Therapeutics Committee. The patient met all eligible criteria:  1) Age = 18 years old   2) Received at least one dose of the IV form   3) Receiving at least one other scheduled oral/enteral medication   4) Tolerating an oral/enteral diet   and did not have any exclusions:   1) Critical care patient   2) Active GI bleed (IF assessing H2RAs or PPIs)   3) Continuous tube feeding (IF assessing cipro, doxycycline, levofloxacin, minocycline, rifampin, or voriconazole)   4) Receiving PO vancomycin (IF assessing metronidazole)   5) Persistent nausea and/or vomiting   6) Ileus or gastrointestinal obstruction   7) Salima/nasogastric tube set for continuous suction   8) Specific order not to automatically convert to PO (in the order's comments or if discussed in the most recent Infectious Disease or primary team's progress notes).

## 2024-12-18 NOTE — PROGRESS NOTES
Progress Note - Hospitalist   Name: Iris Curtis 49 y.o. female I MRN: [id number]  Unit/Bed#: -01 I Date of Admission: 12/16/2024   Date of Service: 12/18/2024 I Hospital Day: 0    Assessment & Plan  Sepsis due to pneumonia (HCC)  Patient initially met sepsis criteria by being tachypneic, and tachycardic  Sepsis parameters have significantly improved, patient remains intermittently tachycardic but otherwise is afebrile, no longer tachypneic and has a mild steroid-induced leukocytosis now  CTA chest PE study-1. Groundglass opacities throughout the lungs may represent acute pneumonitis or atypical pneumonia.   Testing for COVID-19, RSV, and influenza B is negative  Testing for influenza A is positive  Blood cultures x 2 sets-no growth at 24 hours  Urine testing for both Streptococcus pneumonia and Legionella antigen is negative  Continue Levaquin day 3  Patient is still requiring supplemental oxygen, since her clinical improvement is slower than expected, I will switch her from the observation to the inpatient classification since she will be on the hospital greater than 2 midnights for continued antibiotics, steroids, and supplemental oxygen  Hopeful discharge planning in 24 hours  Hypokalemia  Initial potassium 2.9  Follow-up potassium 3.7, resolved with repletion  Influenza A  Patient had symptom onset approximately 9 to 10 days ago  Not a candidate for Tamiflu  Continue supportive therapy  Type 2 diabetes mellitus without complication, without long-term current use of insulin ([facility])  Lab Results   Component Value Date    HGBA1C 8.5 (A) 10/03/2024       Recent Labs     12/17/24  1617 12/17/24  2109 12/18/24  0743 12/18/24  1104   POCGLU 240* 257* 242* 297*       Blood Sugar Average: Last 72 hrs:  (P) [id number]  Oral hypoglycemics remain on hold  Glucose control complicated by IV steroid therapy  Will add basal Lantus  Continue Accu-Cheks before meals and at bedtime with sliding scale coverage  otherwise  Hyperlipidemia  Continue pravastatin  Morbid obesity (HCC)  Actual BMI of 43.42  Dietary, weight loss, and lifestyle modification counseling was provided  Migraine without status migrainosus, not intractable  Stable continue Elavil, Topamax and as needed sumatriptan  Moderate persistent asthma without complication  With an acute exacerbation secondary to influenza A positivity, and the pneumonia as outlined above  Decrease Solu-Medrol dosing from every 6 hours to every 8 hours  Continue Breo, Xopenex and Claritin  Hopeful transition to prednisone in the a.m.  Continue respiratory protocol  Continue to wean oxygen as able  Gastroesophageal reflux disease  Continue PPI therapy  Anxiety  Continue prehospital BuSpar 5 mg p.o. twice daily  Acute respiratory insufficiency  Patient was hypoxic with an O2 saturation as low as 87% on room air  Current oxygen requirements are 1 to 2 L of supplemental oxygen via nasal cannula with a resultant O2 sat in the low 90s  Secondary to the pneumonia as outlined above  Continue to wean oxygen as able    VTE Pharmacologic Prophylaxis: VTE Score: 5 High Risk (Score >/= 5) - Pharmacological DVT Prophylaxis Ordered: heparin. Sequential Compression Devices Ordered.    Mobility:   Basic Mobility Inpatient Raw Score: 23  JH-HLM Goal: 7: Walk 25 feet or more  JH-HLM Achieved: 6: Walk 10 steps or more  JH-HLM Goal achieved. Continue to encourage appropriate mobility.    Patient Centered Rounds: I performed bedside rounds with nursing staff today.   Discussions with Specialists or Other Care Team Provider: Case management    Education and Discussions with Family / Patient: Updated  () at bedside.    Current Length of Stay: 0 day(s)  Current Patient Status: Inpatient   Certification Statement: The patient will continue to require additional inpatient hospital stay due to need for continued IV steroids  Discharge Plan: Anticipate discharge in 24-48 hrs to  home.    Code Status: Level 1 - Full Code    Subjective   Patient seen, sitting up on the side of her bed, reports feeling better, however states that she does not feel that she is ready to go home because she still feels short of breath with exertion and still requiring supplemental oxygen    Objective :  Temp:  [97.4 °F (36.3 °C)-98.2 °F (36.8 °C)] 97.4 °F (36.3 °C)  HR:  [76-98] 98  BP: (127-142)/(72-86) 127/72  Resp:  [18-20] 20  SpO2:  [89 %-94 %] 91 %  O2 Device: Nasal cannula  Nasal Cannula O2 Flow Rate (L/min):  [1 L/min-2 L/min] 1 L/min    There is no height or weight on file to calculate BMI.     Input and Output Summary (last 24 hours):     Intake/Output Summary (Last 24 hours) at 12/18/2024 1111  Last data filed at 12/18/2024 0800  Gross per 24 hour   Intake 980 ml   Output --   Net 980 ml       Physical Exam  Constitutional:       General: She is not in acute distress.     Appearance: Normal appearance. She is normal weight. She is not ill-appearing.   HENT:      Head: Normocephalic and atraumatic.      Nose: Nose normal.      Mouth/Throat:      Mouth: Mucous membranes are moist.   Eyes:      Extraocular Movements: Extraocular movements intact.      Pupils: Pupils are equal, round, and reactive to light.   Cardiovascular:      Rate and Rhythm: Normal rate and regular rhythm.      Pulses: Normal pulses.      Heart sounds: Normal heart sounds. No murmur heard.     No friction rub. No gallop.   Pulmonary:      Effort: Pulmonary effort is normal. No respiratory distress.      Breath sounds: Normal breath sounds. No wheezing, rhonchi or rales.      Comments: Tight air entry throughout  Abdominal:      General: There is no distension.      Palpations: Abdomen is soft. There is no mass.      Tenderness: There is no abdominal tenderness.      Hernia: No hernia is present.   Musculoskeletal:         General: No swelling or tenderness. Normal range of motion.      Cervical back: Normal range of motion and neck  supple. No rigidity.      Right lower leg: No edema.      Left lower leg: No edema.   Skin:     General: Skin is warm.      Capillary Refill: Capillary refill takes less than 2 seconds.      Findings: No erythema or rash.   Neurological:      General: No focal deficit present.      Mental Status: She is alert and oriented to person, place, and time. Mental status is at baseline.      Cranial Nerves: No cranial nerve deficit.      Motor: No weakness.   Psychiatric:         Mood and Affect: Mood normal.         Behavior: Behavior normal.           Lines/Drains:              Lab Results: I have reviewed the following results:   Results from last 7 days   Lab Units 12/18/24  0535 12/17/24  0645 12/16/24 1913   WBC Thousand/uL 12.81*   < > 10.40*   HEMOGLOBIN g/dL 9.6*   < > 11.4*   HEMATOCRIT % 32.3*   < > 37.9   PLATELETS Thousands/uL 396*   < > 376   BANDS PCT %  --   --  2   SEGS PCT % 85*  --   --    LYMPHO PCT % 10*  --  18   MONO PCT % 3*  --  7   EOS PCT % 0  --  1    < > = values in this interval not displayed.     Results from last 7 days   Lab Units 12/18/24  0535 12/17/24  0645 12/16/24 1913   SODIUM mmol/L 135   < > 136   POTASSIUM mmol/L 3.7   < > 2.9*   CHLORIDE mmol/L 102   < > 97   CO2 mmol/L 24   < > 28   BUN mg/dL 11   < > 6   CREATININE mg/dL 0.51*   < > 0.54*   ANION GAP mmol/L 9   < > 11   CALCIUM mg/dL 8.6   < > 9.0   ALBUMIN g/dL  --   --  3.7   TOTAL BILIRUBIN mg/dL  --   --  0.53   ALK PHOS U/L  --   --  79   ALT U/L  --   --  80*   AST U/L  --   --  28   GLUCOSE RANDOM mg/dL 217*   < > 136    < > = values in this interval not displayed.     Results from last 7 days   Lab Units 12/16/24 1913   INR  1.04     Results from last 7 days   Lab Units 12/18/24  1104 12/18/24  0743 12/17/24  2109 12/17/24  1617 12/17/24  1111 12/17/24  0730 12/17/24  0010   POC GLUCOSE mg/dl 297* 242* 257* 240* 228* 175* 109         Results from last 7 days   Lab Units 12/18/24  0535 12/16/24  1938 12/16/24 1913    LACTIC ACID mmol/L  --  1.3  --    PROCALCITONIN ng/ml 0.09  --  0.08       Recent Cultures (last 7 days):   Results from last 7 days   Lab Units 12/17/24  0733 12/16/24  1938   BLOOD CULTURE   --  No Growth at 24 hrs.  No Growth at 24 hrs.   LEGIONELLA URINARY ANTIGEN  Negative  --        Imaging Results Review: No pertinent imaging studies reviewed.  Other Study Results Review: No additional pertinent studies reviewed.    Last 24 Hours Medication List:     Current Facility-Administered Medications:     acetaminophen (TYLENOL) tablet 650 mg, Q6H PRN    albuterol (PROVENTIL HFA,VENTOLIN HFA) inhaler 2 puff, Q4H PRN    amitriptyline (ELAVIL) tablet 20 mg, HS    busPIRone (BUSPAR) tablet 5 mg, BID    Cholecalciferol (VITAMIN D3) tablet 1,000 Units, Daily    fluticasone (FLONASE) 50 mcg/act nasal spray 2 spray, Daily    fluticasone-vilanterol 200-25 mcg/actuation 1 puff, Daily    heparin (porcine) subcutaneous injection 5,000 Units, Q8H GATO    HYDROcodone Bit-Homatrop MBr (HYCODAN) oral syrup 5 mL, Q4H PRN    insulin glargine (LANTUS) subcutaneous injection 15 Units 0.15 mL, HS    insulin lispro (HumALOG/ADMELOG) 100 units/mL subcutaneous injection 1-6 Units, TID AC **AND** Fingerstick Glucose (POCT), TID AC    insulin lispro (HumALOG/ADMELOG) 100 units/mL subcutaneous injection 1-6 Units, HS    levalbuterol (XOPENEX) inhalation solution 1.25 mg, TID **AND** [DISCONTINUED] sodium chloride 0.9 % inhalation solution 3 mL, TID    levofloxacin (LEVAQUIN) tablet 750 mg, Q24H    loratadine (CLARITIN) tablet 10 mg, Daily    losartan (COZAAR) tablet 25 mg, Daily    meloxicam (MOBIC) tablet 15 mg, Daily    methylPREDNISolone sodium succinate (Solu-MEDROL) injection 40 mg, Q8H GATO    metoprolol succinate (TOPROL-XL) 24 hr tablet 50 mg, Daily    montelukast (SINGULAIR) tablet 10 mg, HS    nicotine (NICODERM CQ) 21 mg/24 hr TD 24 hr patch 1 patch, Daily    nystatin (MYCOSTATIN) powder, TID    ondansetron (ZOFRAN) injection 4  mg, Q6H PRN    pantoprazole (PROTONIX) EC tablet 40 mg, Early Morning    pravastatin (PRAVACHOL) tablet 80 mg, Daily With Dinner    SUMAtriptan (IMITREX) tablet 100 mg, Once PRN    topiramate (TOPAMAX) tablet 75 mg, HS    Administrative Statements   Today, Patient Was Seen By: Horace Escalante MD      **Please Note: This note may have been constructed using a voice recognition system.**

## 2024-12-18 NOTE — ASSESSMENT & PLAN NOTE
Patient was hypoxic with an O2 saturation as low as 87% on room air  Current oxygen requirements are 1 to 2 L of supplemental oxygen via nasal cannula with a resultant O2 sat in the low 90s  Secondary to the pneumonia as outlined above  Continue to wean oxygen as able

## 2024-12-19 ENCOUNTER — APPOINTMENT (OUTPATIENT)
Dept: PHYSICAL THERAPY | Facility: CLINIC | Age: 49
End: 2024-12-19
Payer: COMMERCIAL

## 2024-12-19 VITALS
DIASTOLIC BLOOD PRESSURE: 90 MMHG | HEART RATE: 82 BPM | OXYGEN SATURATION: 98 % | SYSTOLIC BLOOD PRESSURE: 143 MMHG | RESPIRATION RATE: 18 BRPM | TEMPERATURE: 97.4 F

## 2024-12-19 LAB
ANION GAP SERPL CALCULATED.3IONS-SCNC: 8 MMOL/L (ref 4–13)
ANISOCYTOSIS BLD QL SMEAR: PRESENT
BASOPHILS # BLD MANUAL: 0 THOUSAND/UL (ref 0–0.1)
BASOPHILS NFR MAR MANUAL: 0 % (ref 0–1)
BUN SERPL-MCNC: 14 MG/DL (ref 5–25)
CALCIUM SERPL-MCNC: 9 MG/DL (ref 8.4–10.2)
CHLORIDE SERPL-SCNC: 103 MMOL/L (ref 96–108)
CO2 SERPL-SCNC: 23 MMOL/L (ref 21–32)
CREAT SERPL-MCNC: 0.6 MG/DL (ref 0.6–1.3)
EOSINOPHIL # BLD MANUAL: 0 THOUSAND/UL (ref 0–0.4)
EOSINOPHIL NFR BLD MANUAL: 0 % (ref 0–6)
ERYTHROCYTE [DISTWIDTH] IN BLOOD BY AUTOMATED COUNT: 16.8 % (ref 11.6–15.1)
GFR SERPL CREATININE-BSD FRML MDRD: 108 ML/MIN/1.73SQ M
GLUCOSE SERPL-MCNC: 273 MG/DL (ref 65–140)
GLUCOSE SERPL-MCNC: 290 MG/DL (ref 65–140)
GLUCOSE SERPL-MCNC: 328 MG/DL (ref 65–140)
HCT VFR BLD AUTO: 32.2 % (ref 34.8–46.1)
HGB BLD-MCNC: 9.8 G/DL (ref 11.5–15.4)
LG PLATELETS BLD QL SMEAR: PRESENT
LYMPHOCYTES # BLD AUTO: 16 % (ref 14–44)
LYMPHOCYTES # BLD AUTO: 2.95 THOUSAND/UL (ref 0.6–4.47)
MCH RBC QN AUTO: 23 PG (ref 26.8–34.3)
MCHC RBC AUTO-ENTMCNC: 30.4 G/DL (ref 31.4–37.4)
MCV RBC AUTO: 76 FL (ref 82–98)
METAMYELOCYTE ABSOLUTE CT: 0.16 THOUSAND/UL (ref 0–0.1)
METAMYELOCYTES NFR BLD MANUAL: 1 % (ref 0–1)
MICROCYTES BLD QL AUTO: PRESENT
MONOCYTES # BLD AUTO: 0.62 THOUSAND/UL (ref 0–1.22)
MONOCYTES NFR BLD: 4 % (ref 4–12)
NEUTROPHILS # BLD MANUAL: 11.79 THOUSAND/UL (ref 1.85–7.62)
NEUTS SEG NFR BLD AUTO: 76 % (ref 43–75)
PLATELET # BLD AUTO: 400 THOUSANDS/UL (ref 149–390)
PLATELET BLD QL SMEAR: ADEQUATE
PMV BLD AUTO: 8.7 FL (ref 8.9–12.7)
POTASSIUM SERPL-SCNC: 3.8 MMOL/L (ref 3.5–5.3)
RBC # BLD AUTO: 4.26 MILLION/UL (ref 3.81–5.12)
RBC MORPH BLD: PRESENT
SODIUM SERPL-SCNC: 134 MMOL/L (ref 135–147)
TOXIC GRANULES BLD QL SMEAR: PRESENT
VARIANT LYMPHS # BLD AUTO: 3 %
WBC # BLD AUTO: 15.51 THOUSAND/UL (ref 4.31–10.16)

## 2024-12-19 PROCEDURE — 99239 HOSP IP/OBS DSCHRG MGMT >30: CPT | Performed by: HOSPITALIST

## 2024-12-19 PROCEDURE — 94640 AIRWAY INHALATION TREATMENT: CPT

## 2024-12-19 PROCEDURE — 94760 N-INVAS EAR/PLS OXIMETRY 1: CPT

## 2024-12-19 PROCEDURE — 80048 BASIC METABOLIC PNL TOTAL CA: CPT | Performed by: HOSPITALIST

## 2024-12-19 PROCEDURE — 85007 BL SMEAR W/DIFF WBC COUNT: CPT | Performed by: HOSPITALIST

## 2024-12-19 PROCEDURE — 85027 COMPLETE CBC AUTOMATED: CPT | Performed by: HOSPITALIST

## 2024-12-19 PROCEDURE — 94664 DEMO&/EVAL PT USE INHALER: CPT

## 2024-12-19 PROCEDURE — 82948 REAGENT STRIP/BLOOD GLUCOSE: CPT

## 2024-12-19 RX ORDER — PREDNISONE 20 MG/1
TABLET ORAL
Qty: 9 TABLET | Refills: 0 | Status: SHIPPED | OUTPATIENT
Start: 2024-12-19 | End: 2024-12-25

## 2024-12-19 RX ORDER — LEVOFLOXACIN 750 MG/1
750 TABLET, FILM COATED ORAL EVERY 24 HOURS
Qty: 2 TABLET | Refills: 0 | Status: SHIPPED | OUTPATIENT
Start: 2024-12-19 | End: 2024-12-21

## 2024-12-19 RX ORDER — PREDNISONE 20 MG/1
40 TABLET ORAL DAILY
Status: DISCONTINUED | OUTPATIENT
Start: 2024-12-19 | End: 2024-12-19 | Stop reason: HOSPADM

## 2024-12-19 RX ADMIN — FLUTICASONE PROPIONATE 2 SPRAY: 50 SPRAY, METERED NASAL at 09:31

## 2024-12-19 RX ADMIN — PREDNISONE 40 MG: 20 TABLET ORAL at 12:06

## 2024-12-19 RX ADMIN — FLUTICASONE FUROATE AND VILANTEROL TRIFENATATE 1 PUFF: 200; 25 POWDER RESPIRATORY (INHALATION) at 07:42

## 2024-12-19 RX ADMIN — Medication 1000 UNITS: at 09:28

## 2024-12-19 RX ADMIN — METHYLPREDNISOLONE SODIUM SUCCINATE 40 MG: 40 INJECTION, POWDER, FOR SOLUTION INTRAMUSCULAR; INTRAVENOUS at 05:47

## 2024-12-19 RX ADMIN — HEPARIN SODIUM 5000 UNITS: 5000 INJECTION, SOLUTION INTRAVENOUS; SUBCUTANEOUS at 05:48

## 2024-12-19 RX ADMIN — INSULIN LISPRO 5 UNITS: 100 INJECTION, SOLUTION INTRAVENOUS; SUBCUTANEOUS at 12:06

## 2024-12-19 RX ADMIN — NICOTINE 1 PATCH: 21 PATCH, EXTENDED RELEASE TRANSDERMAL at 09:29

## 2024-12-19 RX ADMIN — METOPROLOL SUCCINATE 50 MG: 50 TABLET, EXTENDED RELEASE ORAL at 09:28

## 2024-12-19 RX ADMIN — NYSTATIN: 100000 POWDER TOPICAL at 09:31

## 2024-12-19 RX ADMIN — INSULIN LISPRO 4 UNITS: 100 INJECTION, SOLUTION INTRAVENOUS; SUBCUTANEOUS at 07:42

## 2024-12-19 RX ADMIN — LORATADINE 10 MG: 10 TABLET ORAL at 09:29

## 2024-12-19 RX ADMIN — MELOXICAM 15 MG: 7.5 TABLET ORAL at 09:28

## 2024-12-19 RX ADMIN — PANTOPRAZOLE SODIUM 40 MG: 40 TABLET, DELAYED RELEASE ORAL at 05:48

## 2024-12-19 RX ADMIN — BUSPIRONE HYDROCHLORIDE 5 MG: 5 TABLET ORAL at 09:28

## 2024-12-19 RX ADMIN — LEVALBUTEROL HYDROCHLORIDE 1.25 MG: 1.25 SOLUTION RESPIRATORY (INHALATION) at 07:14

## 2024-12-19 RX ADMIN — LOSARTAN POTASSIUM 25 MG: 25 TABLET, FILM COATED ORAL at 09:28

## 2024-12-19 NOTE — ASSESSMENT & PLAN NOTE
With an acute exacerbation secondary to influenza A positivity, and the pneumonia as outlined above  Status post treatment with IV Solu-Medrol-will discharge home on a prednisone taper-40 mg daily p.o. x 3 days then 20 mg daily p.o. x 3 days then stop  Continue Breo, Xopenex and Claritin in the post hospital discharge setting as the patient was taking prior to arrival

## 2024-12-19 NOTE — ASSESSMENT & PLAN NOTE
Patient had symptom onset approximately 9 to 10 days ago  Not a candidate for Tamiflu  Stable for discharge

## 2024-12-19 NOTE — UTILIZATION REVIEW
Continued Stay Review  PLEASE SEE INITIAL REVIEW COMPLETED BY GERMÁN BONILLA RN ON 12/18  Date: 12/19                          Current Patient Class: Inpatient  Current Level of Care: Inpatient     HPI:49 y.o. female initially admitted on 12/18     Assessment/Plan:  Blood cultures show no growth.  Urine testing for both Streptococcus pneumonia and Legionella antigen is negative. Hypokalemia resolved with supplement.     Medications:   Scheduled Medications:  amitriptyline, 20 mg, Oral, HS  busPIRone, 5 mg, Oral, BID  Cholecalciferol, 1,000 Units, Oral, Daily  fluticasone, 2 spray, Each Nare, Daily  fluticasone-vilanterol, 1 puff, Inhalation, Daily  heparin (porcine), 5,000 Units, Subcutaneous, Q8H GATO  insulin glargine, 15 Units, Subcutaneous, HS  insulin lispro, 1-6 Units, Subcutaneous, TID AC  insulin lispro, 1-6 Units, Subcutaneous, HS  levalbuterol, 1.25 mg, Nebulization, TID  levofloxacin, 750 mg, Oral, Q24H  loratadine, 10 mg, Oral, Daily  losartan, 25 mg, Oral, Daily  meloxicam, 15 mg, Oral, Daily  metoprolol succinate, 50 mg, Oral, Daily  montelukast, 10 mg, Oral, HS  nicotine, 1 patch, Transdermal, Daily  nystatin, , Topical, TID  pantoprazole, 40 mg, Oral, Early Morning  pravastatin, 80 mg, Oral, Daily With Dinner  predniSONE, 40 mg, Oral, Daily  topiramate, 75 mg, Oral, HS      Continuous IV Infusions:     PRN Meds:  acetaminophen, 650 mg, Oral, Q6H PRN  albuterol, 2 puff, Inhalation, Q4H PRN  HYDROcodone Bit-Homatrop MBr, 5 mL, Oral, Q4H PRN  ondansetron, 4 mg, Intravenous, Q6H PRN  SUMAtriptan, 100 mg, Oral, Once PRN      Discharge Plan: TBD    Vital Signs (last 3 days)       Date/Time Temp Pulse Resp BP MAP (mmHg) SpO2 Calculated FIO2 (%) - Nasal Cannula Nasal Cannula O2 Flow Rate (L/min) O2 Device Patient Position - Orthostatic VS [place] Coma Scale Score Pain    12/19/24 0900 -- -- -- -- -- -- -- -- -- -- 15 4    12/19/24 0725 -- -- -- -- -- 98 % -- -- -- -- -- --    12/19/24 0714 -- -- -- -- -- 95  % -- -- None (Room air) -- -- --    12/19/24 0713 -- -- -- -- -- 94 % -- -- -- -- -- --    12/19/24 06:59:27 -- 82 18 143/90 108 93 % -- -- None (Room air) Sitting -- --    12/18/24 23:13:15 97.4 °F (36.3 °C) 88 18 149/89 109 92 % -- -- -- -- -- --    12/18/24 2042 -- -- -- -- -- -- -- -- -- -- 15 --    12/18/24 2015 -- -- -- -- -- -- -- -- None (Room air) -- -- No Pain    12/18/24 1851 -- -- -- -- -- 94 % -- -- None (Room air) -- -- --    12/18/24 1850 -- -- -- -- -- 93 % -- -- None (Room air) -- -- --    12/18/24 1849 -- -- -- -- -- 93 % -- -- -- -- -- --    12/18/24 1848 -- -- -- -- -- 92 % -- -- -- -- -- --    12/18/24 14:52:31 97.4 °F (36.3 °C) 83 18 131/74 93 93 % 22 0.5 L/min Nasal cannula -- -- --    12/18/24 1302 -- -- -- -- -- 95 % 22 0.5 L/min Nasal cannula -- -- --    12/18/24 0817 -- -- -- -- -- -- -- -- -- -- 15 4    12/18/24 07:44:26 97.4 °F (36.3 °C) 98 20 127/72 90 91 % -- -- -- Sitting -- --    12/18/24 0714 -- -- -- -- -- 94 % 24 1 L/min Nasal cannula -- -- --    12/18/24 0300 97.5 °F (36.4 °C) 76 18 135/82 100 93 % -- -- Nasal cannula Lying -- --    12/18/24 0224 -- -- -- -- -- -- -- -- -- -- 15 No Pain    12/17/24 23:45:39 98.1 °F (36.7 °C) 84 18 137/81 100 91 % -- -- -- -- -- --    12/17/24 2047 -- -- -- -- -- -- 24 1 L/min Nasal cannula -- -- --    12/17/24 19:30:10 97.4 °F (36.3 °C) 92 18 142/86 105 92 % -- -- -- -- -- --    12/17/24 1746 -- -- -- -- -- 93 % 24 1 L/min Nasal cannula -- -- --    12/17/24 15:16:16 98.2 °F (36.8 °C) 97 -- -- -- 90 % -- -- -- -- -- --    12/17/24 14:24:34 -- 98 20 139/79 99 92 % 28 2 L/min Nasal cannula -- -- --    12/17/24 1308 -- -- -- -- -- -- 28 2 L/min Nasal cannula -- -- --    12/17/24 1238 -- -- -- -- -- 89 % 28 2 L/min Nasal cannula -- -- --    12/17/24 11:11:18 -- 106 -- 139/75 96 90 % -- -- -- -- -- --    12/17/24 0900 -- -- -- -- -- 91 % 28 2 L/min -- -- 15 --    12/17/24 0853 -- -- -- -- -- -- -- -- -- -- -- 5    12/17/24 0740 -- -- -- -- -- 92 % 28 2  L/min Nasal cannula -- -- --    12/17/24 07:30:50 97.7 °F (36.5 °C) 100 -- 136/90 105 -- -- -- -- -- -- --    12/17/24 0057 -- -- -- -- -- -- 28 2 L/min Nasal cannula -- -- --    12/17/24 0032 -- -- -- -- -- -- -- -- -- -- 15 6    12/16/24 2341 -- -- 20 -- -- -- 28 2 L/min  Nasal cannula  -- -- --    12/16/24 22:36:58 98.6 °F (37 °C) 105 18 155/88 110 90 % -- -- -- -- -- --    12/16/24 2045 -- 97 20 171/82 118 90 % 28 2 L/min Nasal cannula Sitting -- --    12/16/24 1955 -- -- -- -- -- -- -- -- Nasal cannula -- 15 --    12/16/24 1859 98.4 °F (36.9 °C) -- -- -- -- 91 % 32 3 L/min Nasal cannula -- -- --    12/16/24 1858 -- 105 24 163/89 -- 87 % -- -- None (Room air) -- -- --          Weight (last 2 days)       None            Pertinent Labs/Diagnostic Results:   Radiology:  CTA chest pe study   Final Interpretation by Jin Noguera MD (12/16 2112)         1. Groundglass opacities throughout the lungs may represent acute pneumonitis or atypical pneumonia.   2. No evidence of acute pulmonary embolus.                  Workstation performed: [id number]         XR chest 1 view portable   ED Interpretation by Antonino Ponce III, DO (12/16 1949)   Significantly underpenetrated portable chest x-ray, possible PTA on R side.      Final Interpretation by Ming Loya MD (12/17 0601)      Lingular pneumonia and bilateral bronchiolitis.            Workstation performed: [id number]           Cardiology:  ECG 12 lead   Final Result by Jason Cardenas DO (12/17 0532)   Sinus tachycardia   Nonspecific ST abnormality   Abnormal ECG   When compared with ECG of 21-Feb-2024 06:29,   Vent. rate has increased by  37 bpm      Confirmed by Jason Cardenas ([id number]) on 12/17/2024 5:32:01 AM            Results from last 7 days   Lab Units 12/16/24 1913   SARS-COV-2  Negative     Results from last 7 days   Lab Units 12/19/24  0550 12/18/24  0535 12/17/24  0645 12/16/24 1913   WBC Thousand/uL 15.51* 12.81* 8.35 10.40*  "  HEMOGLOBIN g/dL 9.8* 9.6* 10.5* 11.4*   HEMATOCRIT % 32.2* 32.3* 34.2* 37.9   PLATELETS Thousands/uL 400* 396* 342 376   TOTAL NEUT ABS Thousands/µL  --  10.87*  --   --    BANDS PCT %  --   --   --  2         Results from last 7 days   Lab Units 12/19/24  0550 12/18/24  0535 12/17/24  0645 12/16/24 1913   SODIUM mmol/L 134* 135 136 136   POTASSIUM mmol/L 3.8 3.7 3.7 2.9*   CHLORIDE mmol/L 103 102 102 97   CO2 mmol/L 23 24 26 28   ANION GAP mmol/L 8 9 8 11   BUN mg/dL 14 11 6 6   CREATININE mg/dL 0.60 0.51* 0.45* 0.54*   EGFR ml/min/1.73sq m 108 114 119 112   CALCIUM mg/dL 9.0 8.6 8.4 9.0     Results from last 7 days   Lab Units 12/16/24 1913   AST U/L 28   ALT U/L 80*   ALK PHOS U/L 79   TOTAL PROTEIN g/dL 7.5   ALBUMIN g/dL 3.7   TOTAL BILIRUBIN mg/dL 0.53     Results from last 7 days   Lab Units 12/19/24  1058 12/19/24  0702 12/18/24  2148 12/18/24  1538 12/18/24  1104 12/18/24  0743 12/17/24  2109 12/17/24  1617 12/17/24  1111 12/17/24  0730 12/17/24  0010   POC GLUCOSE mg/dl 328* 273* 287* 265* 297* 242* 257* 240* 228* 175* 109     Results from last 7 days   Lab Units 12/19/24  0550 12/18/24  0535 12/17/24  0645 12/16/24 1913   GLUCOSE RANDOM mg/dL 290* 217* 200* 136             No results found for: \"BETA-HYDROXYBUTYRATE\"                   Results from last 7 days   Lab Units 12/16/24 1913   HS TNI 0HR ng/L <2     Results from last 7 days   Lab Units 12/16/24 1913   D-DIMER QUANTITATIVE ug/ml FEU 0.81*     Results from last 7 days   Lab Units 12/16/24 1913   PROTIME seconds 14.1   INR  1.04   PTT seconds 36*         Results from last 7 days   Lab Units 12/18/24  0535 12/16/24 1913   PROCALCITONIN ng/ml 0.09 0.08     Results from last 7 days   Lab Units 12/16/24  1938   LACTIC ACID mmol/L 1.3             Results from last 7 days   Lab Units 12/16/24 1913   BNP pg/mL 60                     Results from last 7 days   Lab Units 12/17/24  0735 12/17/24  0733 12/16/24 1913   STREP PNEUMONIAE ANTIGEN, " URINE  Negative  --   --    LEGIONELLA URINARY ANTIGEN   --  Negative  --    INFLUENZA A PCR   --   --  Positive*   INFLUENZA B PCR   --   --  Negative   RSV PCR   --   --  Negative             Results from last 7 days   Lab Units 12/16/24  1938   BLOOD CULTURE  No Growth at 48 hrs.  No Growth at 48 hrs.               Network Utilization Review Department  ATTENTION: Please call with any questions or concerns to [phone] and carefully listen to the prompts so that you are directed to the right person. All voicemails are confidential.   For Discharge needs, contact Care Management DC Support Team at [phone] opt. 2  Send all requests for admission clinical reviews, approved or denied determinations and any other requests to dedicated fax number below belonging to the campus where the patient is receiving treatment. List of dedicated fax numbers for the Facilities:  FACILITY NAME UR FAX NUMBER   ADMISSION DENIALS (Administrative/Medical Necessity) [phone]   DISCHARGE SUPPORT TEAM (NETWORK) [phone]   PARENT CHILD HEALTH (Maternity/NICU/Pediatrics) [phone]   [facility] [phone]   [facility] [phone]   [facility] [phone]   [facility] [phone]   [facility] [phone]   [facility] [phone]   [facility] [phone]   [facility] [phone]   [facility] [phone]   [facility] [phone]   [facility] [phone]   [facility] [phone]

## 2024-12-19 NOTE — DISCHARGE INSTR - AVS FIRST PAGE
Dear Iris Curtis,     It was our pleasure to care for you here at [facility].  It is our hope that we were always able to exceed the expected standards for your care during your stay.  You were hospitalized due to influenza, and pneumonia.  You were cared for on the medical/surgical floor by Horace Escalante MD with the [facility] who covers for your primary care physician (PCP), MARGUERITE Patel, while you were hospitalized.  If you have any questions or concerns related to this hospitalization, you may contact us at .  For follow up as well as any medication refills, we recommend that you follow up with your primary care physician.  A registered nurse will reach out to you by phone within a few days after your discharge to answer any additional questions that you may have after going home.  However, at this time we provide for you here, the most important instructions / recommendations at discharge:     Notable Medication Adjustments -   New prescription levofloxacin 750 mg take 1 tablet daily by mouth for 2 more days then stop  New prescription prednisone-take 40 mg daily by mouth for 3 days then 20 mg daily by mouth for 3 days then stop  Okay to continue all other preadmission medications at the preadmission doses without change  Testing Required after Discharge -   To be further determined in the outpatient setting by your primary care provider  Important follow up information -   Please follow-up with your PCP within 7 to 10 days  Other Instructions -   Please maintain a healthy diabetic diet  Please review this entire after visit summary as additional general instructions including medication list, appointments, activity, diet, any pertinent wound care, and other additional recommendations from your care team that may be provided for you.      Sincerely,     Horace Escalante MD

## 2024-12-19 NOTE — DISCHARGE SUMMARY
Discharge Summary - Hospitalist   Name: Iris Curtis 49 y.o. female I MRN: [id number]  Unit/Bed#: -01 I Date of Admission: 12/16/2024   Date of Service: 12/19/2024 I Hospital Day: 1     Assessment & Plan  Sepsis due to pneumonia (HCC)  Patient initially met sepsis criteria by being tachypneic, and tachycardic  Sepsis parameters have resolved-patient has a residual steroid-induced leukocytosis  CTA chest PE study-1. Groundglass opacities throughout the lungs may represent acute pneumonitis or atypical pneumonia.   Testing for COVID-19, RSV, and influenza B is negative  Testing for influenza A is positive  Blood cultures x 2 sets-no growth at 48 hours  Urine testing for both Streptococcus pneumonia and Legionella antigen is negative  Patient is medically stable for discharge  Status post 3 days worth of Levaquin here in the hospital, will discharge on 2 more days worth of Levaquin at 750 mg p.o. daily to complete a total of 5 days  Outpatient follow-up with PCP in 7 to 10 days  DC home on all of the preadmit meds at preadmit doses  Hypokalemia  Initial potassium 2.9  Follow-up potassium 3.8, resolved with repletion  Influenza A  Patient had symptom onset approximately 9 to 10 days ago  Not a candidate for Tamiflu  Stable for discharge  Type 2 diabetes mellitus without complication, without long-term current use of insulin (HCC)  Lab Results   Component Value Date    HGBA1C 8.5 (A) 10/03/2024       Recent Labs     12/18/24  1104 12/18/24  1538 12/18/24  2148 12/19/24  0702   POCGLU 297* 265* 287* 273*       Blood Sugar Average: Last 72 hrs:  (P) 237.3  Patient with a steroid-induced hyperglycemia  Okay for discharge on preadmit meds at preadmit doses  Steroid taper should be completed within 6 days  Hyperlipidemia  Continue pravastatin post discharge  Morbid obesity (HCC)  Actual BMI of 43.42  Dietary, weight loss, and lifestyle modification counseling was provided  Migraine without status migrainosus, not  intractable  Stable continue Elavil, Topamax and as needed sumatriptan in the post discharge setting as the patient was taking prior to arrival  Moderate persistent asthma without complication  With an acute exacerbation secondary to influenza A positivity, and the pneumonia as outlined above  Status post treatment with IV Solu-Medrol-will discharge home on a prednisone taper-40 mg daily p.o. x 3 days then 20 mg daily p.o. x 3 days then stop  Continue Breo, Xopenex and Claritin in the post hospital discharge setting as the patient was taking prior to arrival    Gastroesophageal reflux disease  Continue PPI therapy post discharge  Anxiety  Continue prehospital BuSpar 5 mg p.o. twice daily post discharge  Acute respiratory insufficiency  Patient was hypoxic with an O2 saturation as low as 87% on room air  Has resolved, patient has been on room air  Okay for discharge home     Medical Problems       Resolved Problems  Date Reviewed: 12/16/2024   None       Discharging Physician / Practitioner: Horace Escalante MD  PCP: MARGUERITE Patel  Admission Date:   Admission Orders (From admission, onward)       Ordered        12/18/24 1051  INPATIENT ADMISSION  Once            12/16/24 2138  Place in Observation  Once                          Discharge Date: 12/19/24    Consultations During Hospital Stay:  None    Procedures Performed:   None    Significant Findings / Test Results:   CTA chest PE study-1. Groundglass opacities throughout the lungs may represent acute pneumonitis or atypical pneumonia.   2. No evidence of acute pulmonary embolus.   X-ray chest-lingular pneumonia and bilateral bronchiolitis    Incidental Findings:   None    Test Results Pending at Discharge (will require follow up):   None     Outpatient Tests Requested:  None    Complications: None    Reason for Admission: Sepsis, pneumonia    Hospital Course:   Iris Curtis is a 49 y.o. female patient who originally presented to the hospital  on 12/16/2024 due to shortness of breath.  Please refer to the initial history and physical examination completed by Keith Carranza PA-C for the initial presenting features and complaints.  In brief, the patient is a 49-year-old female, who was admitted to the hospital after she came in with shortness of breath and was subsequently diagnosed with sepsis secondary to pneumonia.  Pneumonia also resulted in an asthma exacerbation.  Patient tested positive for influenza.  She was admitted to [facility], and was treated with IV steroids, and levofloxacin.  She initially required supplemental oxygen.  By Thursday, 12/19/2024, the patient was back down to room air, and felt a lot better.  She was deemed medically stable for discharge.  She was discharged home on a prednisone taper 40 mg daily by mouth for 3 days and then 20 mg daily by mouth for 3 days and was also given a prescription for 2 more days worth of Levaquin to ultimately complete a total of 5 days inclusive of what she received here in the hospital.  No other changes were made to any of her preadmission medications, and/or to the preadmission doses.  Patient will be following up in the near future in the outpatient setting with her PCP.  Please refer to the assessment/plan portion of this discharge summary as outlined above for additional details regarding her stay.      Please see above list of diagnoses and related plan for additional information.     Condition at Discharge: good    Discharge Day Visit / Exam:   Subjective: Patient seen, doing okay, was resting comfortably in bed at the time of my evaluation  Vitals: Blood Pressure: 143/90 (12/19/24 0659)  Pulse: 82 (12/19/24 0659)  Temperature: (!) 97.4 °F (36.3 °C) (12/18/24 2313)  Temp Source: Oral (12/18/24 0744)  Respirations: 18 (12/19/24 0659)  SpO2: 98 % (12/19/24 0725)  Physical Exam  Constitutional:       General: She is not in acute distress.     Appearance: Normal appearance. She is normal weight. She  is not ill-appearing.   HENT:      Head: Normocephalic and atraumatic.      Nose: Nose normal.      Mouth/Throat:      Mouth: Mucous membranes are moist.   Eyes:      Extraocular Movements: Extraocular movements intact.      Pupils: Pupils are equal, round, and reactive to light.   Cardiovascular:      Rate and Rhythm: Normal rate and regular rhythm.      Pulses: Normal pulses.      Heart sounds: Normal heart sounds. No murmur heard.     No friction rub. No gallop.   Pulmonary:      Effort: Pulmonary effort is normal. No respiratory distress.      Breath sounds: Normal breath sounds. No wheezing, rhonchi or rales.   Abdominal:      General: There is no distension.      Palpations: Abdomen is soft. There is no mass.      Tenderness: There is no abdominal tenderness.      Hernia: No hernia is present.   Musculoskeletal:         General: No swelling or tenderness. Normal range of motion.      Cervical back: Normal range of motion and neck supple. No rigidity.      Right lower leg: No edema.      Left lower leg: No edema.   Skin:     General: Skin is warm.      Capillary Refill: Capillary refill takes less than 2 seconds.      Findings: No erythema or rash.   Neurological:      General: No focal deficit present.      Mental Status: She is alert and oriented to person, place, and time. Mental status is at baseline.      Cranial Nerves: No cranial nerve deficit.      Motor: No weakness.   Psychiatric:         Mood and Affect: Mood normal.         Behavior: Behavior normal.          Discussion with Family: Updated  () at bedside.    Discharge instructions/Information to patient and family:   See after visit summary for information provided to patient and family.      Provisions for Follow-Up Care:  See after visit summary for information related to follow-up care and any pertinent home health orders.      Mobility at time of Discharge:   Basic Mobility Inpatient Raw Score: 23  -[facility] Goal: 7: Walk 25  feet or more  [facility]-HLM Achieved: 7: Walk 25 feet or more  HLM Goal achieved. Continue to encourage appropriate mobility.     Disposition:   Home    Planned Readmission: None    Discharge Medications:  See after visit summary for reconciled discharge medications provided to patient and/or family.      Administrative Statements   Discharge Statement:  I have spent a total time of 35 minutes in caring for this patient on the day of the visit/encounter. >30 minutes of time was spent on: Diagnostic results, Prognosis, Risks and benefits of tx options, Instructions for management, Patient and family education, Importance of tx compliance, Risk factor reductions, Impressions, Counseling / Coordination of care, Documenting in the medical record, and Reviewing / ordering tests, medicine, procedures  .    **Please Note: This note may have been constructed using a voice recognition system**

## 2024-12-19 NOTE — ASSESSMENT & PLAN NOTE
Patient was hypoxic with an O2 saturation as low as 87% on room air  Has resolved, patient has been on room air  Okay for discharge home

## 2024-12-19 NOTE — ASSESSMENT & PLAN NOTE
Stable continue Elavil, Topamax and as needed sumatriptan in the post discharge setting as the patient was taking prior to arrival

## 2024-12-19 NOTE — PLAN OF CARE
Problem: PAIN - ADULT  Goal: Verbalizes/displays adequate comfort level or baseline comfort level  Description: Interventions:  - Encourage patient to monitor pain and request assistance  - Assess pain using appropriate pain scale  - Administer analgesics based on type and severity of pain and evaluate response  - Implement non-pharmacological measures as appropriate and evaluate response  - Consider cultural and social influences on pain and pain management  - Notify physician/advanced practitioner if interventions unsuccessful or patient reports new pain  Outcome: Adequate for Discharge     Problem: INFECTION - ADULT  Goal: Absence or prevention of progression during hospitalization  Description: INTERVENTIONS:  - Assess and monitor for signs and symptoms of infection  - Monitor lab/diagnostic results  - Monitor all insertion sites, i.e. indwelling lines, tubes, and drains  - Monitor endotracheal if appropriate and nasal secretions for changes in amount and color  - [place] appropriate cooling/warming therapies per order  - Administer medications as ordered  - Instruct and encourage patient and family to use good hand hygiene technique  - Identify and instruct in appropriate isolation precautions for identified infection/condition  Outcome: Adequate for Discharge     Problem: SAFETY ADULT  Goal: Patient will remain free of falls  Description: INTERVENTIONS:  - Educate patient/family on patient safety including physical limitations  - Instruct patient to call for assistance with activity   - Consult OT/PT to assist with strengthening/mobility   - Keep Call bell within reach  - Keep bed low and locked with side rails adjusted as appropriate  - Keep care items and personal belongings within reach  - Initiate and maintain comfort rounds  - Make Fall Risk Sign visible to staff  - Obtain necessary fall risk management equipment: non-slip socks  - Apply yellow socks and bracelet for high fall risk patients  -  Consider moving patient to room near nurses station  Outcome: Adequate for Discharge  Goal: Maintain or return to baseline ADL function  Description: INTERVENTIONS:  -  Assess patient's ability to carry out ADLs; assess patient's baseline for ADL function and identify physical deficits which impact ability to perform ADLs (bathing, care of mouth/teeth, toileting, grooming, dressing, etc.)  - Assess/evaluate cause of self-care deficits   - Assess range of motion  - Assess patient's mobility; develop plan if impaired  - Assess patient's need for assistive devices and provide as appropriate  - Encourage maximum independence but intervene and supervise when necessary  - Involve family in performance of ADLs  - Assess for home care needs following discharge   - Consider OT consult to assist with ADL evaluation and planning for discharge  - Provide patient education as appropriate  Outcome: Adequate for Discharge  Goal: Maintains/Returns to pre admission functional level  Description: INTERVENTIONS:  - Perform AM-PAC 6 Click Basic Mobility/ Daily Activity assessment daily.  - Set and communicate daily mobility goal to care team and patient/family/caregiver.   - Collaborate with rehabilitation services on mobility goals if consulted  - Perform Range of Motion 3 times a day.  - Reposition patient every 3 hours.  - Ambulate patient 3 times a day  - Out of bed to chair 3 times a day for meals  - Out of bed for toileting  - Record patient progress and toleration of activity level   Outcome: Adequate for Discharge     Problem: DISCHARGE PLANNING  Goal: Discharge to home or other facility with appropriate resources  Description: INTERVENTIONS:  - Identify barriers to discharge w/patient and caregiver  - Arrange for needed discharge resources and transportation as appropriate  - Identify discharge learning needs (meds, wound care, etc.)  - Arrange for interpretive services to assist at discharge as needed  - Refer to Case  Management Department for coordinating discharge planning if the patient needs post-hospital services based on physician/advanced practitioner order or complex needs related to functional status, cognitive ability, or social support system  Outcome: Adequate for Discharge     Problem: Knowledge Deficit  Goal: Patient/family/caregiver demonstrates understanding of disease process, treatment plan, medications, and discharge instructions  Description: Complete learning assessment and assess knowledge base.  Interventions:  - Provide teaching at level of understanding  - Provide teaching via preferred learning methods  Outcome: Adequate for Discharge     Problem: Nutrition/Hydration-ADULT  Goal: Nutrient/Hydration intake appropriate for improving, restoring or maintaining nutritional needs  Description: Monitor and assess patient's nutrition/hydration status for malnutrition. Collaborate with interdisciplinary team and initiate plan and interventions as ordered.  Monitor patient's weight and dietary intake as ordered or per policy. Utilize nutrition screening tool and intervene as necessary. Determine patient's food preferences and provide high-protein, high-caloric foods as appropriate.     INTERVENTIONS:  - Monitor oral intake, urinary output, labs, and treatment plans  - Assess nutrition and hydration status and recommend course of action  - Evaluate amount of meals eaten  - Assist patient with eating if necessary   - Allow adequate time for meals  - Recommend/ encourage appropriate diets, oral nutritional supplements, and vitamin/mineral supplements  - Order, calculate, and assess calorie counts as needed  - Recommend, monitor, and adjust tube feedings and TPN/PPN based on assessed needs  - Assess need for intravenous fluids  - Provide specific nutrition/hydration education as appropriate  - Include patient/family/caregiver in decisions related to nutrition  Outcome: Adequate for Discharge     Problem:  CARDIOVASCULAR - ADULT  Goal: Maintains optimal cardiac output and hemodynamic stability  Description: INTERVENTIONS:  - Monitor I/O, vital signs and rhythm  - Monitor for S/S and trends of decreased cardiac output  - Administer and titrate ordered vasoactive medications to optimize hemodynamic stability  - Assess quality of pulses, skin color and temperature  - Assess for signs of decreased coronary artery perfusion  - Instruct patient to report change in severity of symptoms  Outcome: Adequate for Discharge     Problem: RESPIRATORY - ADULT  Goal: Achieves optimal ventilation and oxygenation  Description: INTERVENTIONS:  - Assess for changes in respiratory status  - Assess for changes in mentation and behavior  - Position to facilitate oxygenation and minimize respiratory effort  - Oxygen administered by appropriate delivery if ordered  - Initiate smoking cessation education as indicated  - Encourage broncho-pulmonary hygiene including cough, deep breathe, Incentive Spirometry  - Assess the need for suctioning and aspirate as needed  - Assess and instruct to report SOB or any respiratory difficulty  - Respiratory Therapy support as indicated  Outcome: Adequate for Discharge     Problem: METABOLIC, FLUID AND ELECTROLYTES - ADULT  Goal: Glucose maintained within target range  Description: INTERVENTIONS:  - Monitor Blood Glucose as ordered  - Assess for signs and symptoms of hyperglycemia and hypoglycemia  - Administer ordered medications to maintain glucose within target range  - Assess nutritional intake and initiate nutrition service referral as needed  Outcome: Adequate for Discharge

## 2024-12-19 NOTE — ASSESSMENT & PLAN NOTE
Lab Results   Component Value Date    HGBA1C 8.5 (A) 10/03/2024       Recent Labs     12/18/24  1104 12/18/24  1538 12/18/24  2148 12/19/24  0702   POCGLU 297* 265* 287* 273*       Blood Sugar Average: Last 72 hrs:  (P) 237.3  Patient with a steroid-induced hyperglycemia  Okay for discharge on preadmit meds at preadmit doses  Steroid taper should be completed within 6 days

## 2024-12-19 NOTE — NURSING NOTE
Patient's IV removed with catheter tip intact. DSD and pressure applied. AVS reviewed with patient, no further questions/concerns at this time

## 2024-12-19 NOTE — ASSESSMENT & PLAN NOTE
Patient initially met sepsis criteria by being tachypneic, and tachycardic  Sepsis parameters have resolved-patient has a residual steroid-induced leukocytosis  CTA chest PE study-1. Groundglass opacities throughout the lungs may represent acute pneumonitis or atypical pneumonia.   Testing for COVID-19, RSV, and influenza B is negative  Testing for influenza A is positive  Blood cultures x 2 sets-no growth at 48 hours  Urine testing for both Streptococcus pneumonia and Legionella antigen is negative  Patient is medically stable for discharge  Status post 3 days worth of Levaquin here in the hospital, will discharge on 2 more days worth of Levaquin at 750 mg p.o. daily to complete a total of 5 days  Outpatient follow-up with PCP in 7 to 10 days  DC home on all of the preadmit meds at preadmit doses

## 2024-12-20 ENCOUNTER — TRANSITIONAL CARE MANAGEMENT (OUTPATIENT)
Dept: FAMILY MEDICINE CLINIC | Facility: CLINIC | Age: 49
End: 2024-12-20

## 2024-12-20 DIAGNOSIS — M17.0 PRIMARY OSTEOARTHRITIS OF BOTH KNEES: ICD-10-CM

## 2024-12-20 RX ORDER — MELOXICAM 15 MG/1
15 TABLET ORAL DAILY
Qty: 30 TABLET | Refills: 0 | Status: SHIPPED | OUTPATIENT
Start: 2024-12-20

## 2024-12-20 NOTE — UTILIZATION REVIEW
NOTIFICATION OF ADMISSION DISCHARGE   This is a Notification of Discharge from [facility]. Please be advised that this patient has been discharge from our facility. Below you will find the admission and discharge date and time including the patient’s disposition.   UTILIZATION REVIEW CONTACT:  Vera Pate  Utilization   Network Utilization Review Department  Phone: [phone] x carefully listen to the prompts. All voicemails are confidential.  Email: [email or web address]     ADMISSION INFORMATION  PRESENTATION DATE: 12/16/2024  6:49 PM  OBERVATION ADMISSION DATE: 12/16/2024 2138  INPATIENT ADMISSION DATE: 12/18/24 10:51 AM   DISCHARGE DATE: 12/19/2024  1:32 PM   DISPOSITION:Home/Self Care    Network Utilization Review Department  ATTENTION: Please call with any questions or concerns to [phone] and carefully listen to the prompts so that you are directed to the right person. All voicemails are confidential.   For Discharge needs, contact Care Management DC Support Team at [phone] opt. 2  Send all requests for admission clinical reviews, approved or denied determinations and any other requests to dedicated fax number below belonging to the campus where the patient is receiving treatment. List of dedicated fax numbers for the Facilities:  FACILITY NAME UR FAX NUMBER   ADMISSION DENIALS (Administrative/Medical Necessity) [phone]   DISCHARGE SUPPORT TEAM ([facility]) [phone]   PARENT CHILD HEALTH (Maternity/NICU/Pediatrics) [phone]   [facility] [phone]   [facility] [phone]   [facility] [phone]   [facility] [phone]   [facility] [phone]   [facility] [phone]   [facility] [phone]   [facility]  [place] [phone]   [facility] [phone]   [facility] [phone]   [facility] [phone]   [facility] [phone]

## 2024-12-22 LAB
BACTERIA BLD CULT: NORMAL
BACTERIA BLD CULT: NORMAL

## 2024-12-24 ENCOUNTER — EVALUATION (OUTPATIENT)
Dept: PHYSICAL THERAPY | Facility: CLINIC | Age: 49
End: 2024-12-24
Payer: COMMERCIAL

## 2024-12-24 DIAGNOSIS — G89.29 BILATERAL CHRONIC KNEE PAIN: ICD-10-CM

## 2024-12-24 DIAGNOSIS — M25.562 BILATERAL CHRONIC KNEE PAIN: ICD-10-CM

## 2024-12-24 DIAGNOSIS — M25.561 BILATERAL CHRONIC KNEE PAIN: ICD-10-CM

## 2024-12-24 DIAGNOSIS — M17.0 PRIMARY OSTEOARTHRITIS OF BOTH KNEES: Primary | ICD-10-CM

## 2024-12-24 PROCEDURE — 97530 THERAPEUTIC ACTIVITIES: CPT

## 2024-12-24 PROCEDURE — 97110 THERAPEUTIC EXERCISES: CPT

## 2024-12-24 PROCEDURE — 97112 NEUROMUSCULAR REEDUCATION: CPT

## 2024-12-24 NOTE — PROGRESS NOTES
PT Re-Evaluation     Today's date: 2024  Patient name: Iris Curtis  : 1975  MRN: [id number]  Referring provider: Thomas Torres PA-C  Dx:   Encounter Diagnosis     ICD-10-CM    1. Primary osteoarthritis of both knees  M17.0       2. Bilateral chronic knee pain  M25.561     M25.562     G89.29               Start Time: 0800  Stop Time: 0853  Total time in clinic (min): 53 minutes    Assessment  Impairments: abnormal coordination, abnormal gait, abnormal or restricted ROM, activity intolerance, impaired balance, impaired physical strength, pain with function, poor body mechanics, participation limitations and activity limitations  Functional limitations: difficulty standing, walking and stairs  Symptom irritability: moderate    Assessment details: Iris Curtis is a 49 y.o. female presents to physical therapy with a history of chronic bilateral knee pain secondary to knee OA. Patient has attended initial eval and 14 f/u sessions which was disrupted by hospital admission due to pneumonia but still displays improvements in bilateral knee flexion AROM, and hip flexion and bilateral knee strength also shown by increase in 30 sec STS of 10 reps. Although she has made improvements in strength and AROM patient still having pain.  Patient has met another STG and progressing towards remaining STGs and LTGs, and due to multifactorial medical history, participation restrictions, and functional limitation (FOTO 36% function), the patient will benefit from continued skilled PT interventions to address aforementioned impairments to restore PLOF and attain desired goals.    Understanding of Dx/Px/POC: good     Prognosis: good  Prognosis details: Patient is pleasant and motivated to improve function. Demonstrates verbal understanding of POC and able to perform HEP.     Goals  STG (6 weeks)    STG: Pt to improve pain from 8/10 to 5/10 at its worst. PROGRESSING    STG: Pt to improve bilateral knee ext MMT to at  least 4/5 to help with reciprocal gait pattern on stairs. MET    STG: Pt to improve 30 sec STS to at least 10 reps without UE support to improve LE strength and reduce risk for falls. MET    LTG (12 weeks)    LTG: Pt to be able to perform light activities around the house with little difficulty to improve independence with ADL's. PROGRESSING    LTG: Pt to be able to walk a mile with moderate difficulty to be able to return to walking routine with less discomfort. PROGRESSING    LTG: Pt to be independent with HEP to continue to increase strength and mobility to improve overall function. PROGRESSING    Plan  Patient would benefit from: skilled physical therapy  Planned modality interventions: cryotherapy, thermotherapy: hydrocollator packs, unattended electrical stimulation and neuromuscular electric stimulation    Planned therapy interventions: therapeutic activities, therapeutic exercise, gait training, manual therapy and neuromuscular re-education    Frequency: 2-3x week  Duration in weeks: 12  Plan of Care beginning date: 10/22/2024  Plan of Care expiration date: 1/22/2025  Treatment plan discussed with: patient  Plan details: Continue current POC with patient, patient demonstrates understanding. Reassess in 1 month.      Subjective Evaluation    History of Present Illness  Mechanism of injury: SUBJECTIVE 12/24/24: iris Curtis is a 49 y.o. female presents to OPPT with complaints of chronic bilateral knee pain R>L. Patient recently was admitted to hospital due pneumonia from the flu on 12/16/24-12/19/24. Since then, patient has been too fatigued to do workouts. She has been resting to get over illness. She is feeling very weak and tired.    SUBJECTIVE 11/18/24 Iris Curtis is a 49 y.o. female presents to OPPT with complaints of progressive worsening bilateral knee pain R>L. Since starting patient reports that her endurance has improved and able to get up the stairs a little easier. Still having  difficulty with standing activities due to bilateral knee OA.     INJURY HISTORY 10/22/24: Iris Curtis is a 49 y.o. female presents to OPPT with complaints of progressive worsening bilateral knee pain R>L. Right knee has been bothering her for a few years while the L knee has started hurting more in the last 2-3 months and has been clicking, grinding and locking. L knee has difficulty fully extending, and R knee has more difficulty bending. Unable to walk more than a block without assistance due to knee pain and weakness.  Patient Goals  Patient goals for therapy: increased strength, independence with ADLs/IADLs, decreased pain, increased motion and improved balance  Patient goal: wants to be able to hike and go for walks  Pain  Current pain ratin  At best pain rating: 3  At worst pain ratin  Location: BL knees  Quality: sharp and dull ache    Social Support  Steps to enter house: yes  2  Stairs in house: yes   20      Diagnostic Tests  X-ray: abnormal    FCE comments: Bilateral knee OA    Objective     Active Range of Motion   Left Knee   Flexion: 125 degrees with pain  Extension: -5 degrees with pain    Right Knee   Flexion: 120 degrees with pain  Extension: 0 degrees with pain    Mobility   Patellar Mobility:   Left Knee   Hypomobile: left medial, left lateral, left superior and left inferior    Right Knee   Hypomobile: medial, lateral, superior and inferior     Strength/Myotome Testing     Left Hip   Planes of Motion   Flexion: 4+  Abduction: 4  Adduction: 4  External rotation: 4  Internal rotation: 5    Right Hip   Planes of Motion   Flexion: 4+  Abduction: 4  Adduction: 4  External rotation: 5  Internal rotation: 5    Left Knee   Flexion: 5  Extension: 5    Right Knee   Flexion: 5  Extension: 4    Left Ankle/Foot   Dorsiflexion: 4    Right Ankle/Foot   Dorsiflexion: 4    Tests     Additional Tests Details  30 sec STS: 10 reps    TUG: 10 sec    SL balance R: 18 sec  SL balance L: 30  "sec    Ambulation     Ambulation: Stairs   Ascend stairs: independent  Pattern: non-reciprocal  Railings: two rails  Descend stairs: independent  Pattern: non-reciprocal  Railings: two rails         Precautions: none noted  POC: 1/22/25  Re-Eval: 1/22/25  Access Code: [email or web address]   Manuals 12/3 12/5 12/24 11/21 11/27 11/29   Knee mobs         Knee PROM                           Neuro Re-Ed         LAQ w pause #4 2x15 4# 2x15 4# 2x15 4# 2x15 4# 2x15 4# B/L 2x15   Airex SL balance         bridge W/Blue TB 2x10 W/Blue TB 2x10 2x10 Blue band 2x10 2x10 deferred band W/Blue TB 2x10   TKE    GRN x10 B/L  GTB 10x B/L   Lunge stretch    At steps x15 B/L  At stairs 15x B/L   Quad set         SLR           Ther Ex         Nu-step (cardio endurance) 7 min lv 3 S=8 L3 x7 min 7 min lv3 7 min L3 7 min L3 7 min L3   Hip flexor stretch w strap         Leg press S=6 #115 2x15 m #115  2x10 102.5#  2x10 102.5#  2x10 102.5#   2x10   Standing hip abd/ext Standing Abd 2x10  Ext 2x10 Standing ABD 2x10  EXT 2x10 Standing ABD 2x10  EXT 2x10 ABD 2x10  Stand EXT x15 B/L ABD 2x10  Stand EXT R 30x L 20x Standing Abd 2x10  Ext 2x10   Seated Hamstring stretch    Reviewed for HEP     Seated march w ankle weight         clamshell Blue TB B/L 2x15 W/Blue TB 2x15 Blue x10 B/L Blue x15 B/L Blue 2x15 B/L Blue TB B/L 2x15            Ther Activity         Step ups/downs 8in x15 8 inch x15 8in x15 B/L 6\" x15 fwd  6\" lat x10 B/L  6\" step @ stairs 15x Fwd  Lat x 10 ea   Side stepping         STS 2x10 Low mat 2x10 Chair no UE support x10 Low mat x20 Low mat x20 Low mat 20x                              Gait Training                           Modalities                           Reviewed patient scheduling and HEP.      "

## 2024-12-26 ENCOUNTER — TELEPHONE (OUTPATIENT)
Age: 49
End: 2024-12-26

## 2024-12-26 ENCOUNTER — OFFICE VISIT (OUTPATIENT)
Dept: PHYSICAL THERAPY | Facility: CLINIC | Age: 49
End: 2024-12-26
Payer: COMMERCIAL

## 2024-12-26 DIAGNOSIS — M17.0 PRIMARY OSTEOARTHRITIS OF BOTH KNEES: Primary | ICD-10-CM

## 2024-12-26 DIAGNOSIS — M25.562 BILATERAL CHRONIC KNEE PAIN: ICD-10-CM

## 2024-12-26 DIAGNOSIS — M25.561 BILATERAL CHRONIC KNEE PAIN: ICD-10-CM

## 2024-12-26 DIAGNOSIS — G89.29 BILATERAL CHRONIC KNEE PAIN: ICD-10-CM

## 2024-12-26 PROCEDURE — 97112 NEUROMUSCULAR REEDUCATION: CPT

## 2024-12-26 NOTE — TELEPHONE ENCOUNTER
Patient c/o dizzy spells and lightheadedness post hospital stay. She states she feels fuzzy. Asking for additional lab work to be ordered fro er to check her potassium, etc

## 2024-12-26 NOTE — PROGRESS NOTES
Daily Note     Today's date: 2024  Patient name: Iris Curtis  : 1975  MRN: [id number]  Referring provider: Thomas Torres PA-C  Dx:   Encounter Diagnosis     ICD-10-CM    1. Primary osteoarthritis of both knees  M17.0       2. Bilateral chronic knee pain  M25.561     M25.562     G89.29           Start Time: 915  Stop Time: 930  Total time in clinic (min): 15 minutes    Subjective: Patient reports she is feeling lightheaded, dizzy, blurred vision, and numb on entire face. Patient reports that her fasting blood glucose measured at 195mg/dl this morning before coming into clinic. Did not feel comfortable driving herself so had daughter in law drive her to appointment.      Objective: See treatment diary below      Assessment: Tolerated treatment well. Patient demonstrated fatigue post treatment and would benefit from continued PT. Vital Signs at start of session: Pulse Ox: 99%, BP: 148/92 mmHg, HR: 94 bpm. Had patient perform light aerobic exercise for 5 minutes but symptoms remained so terminated. Discussed and educated patient to contact doctor's office or go to urgent care if symptoms persist or worsen as worried her glucose levels are high. Patient alert and oriented and absent slurred speech, verbally demonstrated understanding.       Plan: Continue per plan of care.  Progress treatment as tolerated.       Precautions: none noted  POC: 25  Re-Eval: 25  Access Code: [email or web address]   Manuals 12/3 12/5 12/24 12/26 11/27 11/29   Knee mobs         Knee PROM                           Neuro Re-Ed         LAQ w pause #4 2x15 4# 2x15  4# 2x15 4# 2x15 4# B/L 2x15   Airex SL balance         bridge W/Blue TB 2x10 W/Blue TB 2x10  Blue band 2x10 2x10 deferred band W/Blue TB 2x10   TKE    GRN x10 B/L  GTB 10x B/L   Lunge stretch    At steps x15 B/L  At stairs 15x B/L   Quad set         SLR         Patient education   PM performed 10 min        Ther Ex         Nu-step (cardio endurance) 7 min lv 3 S=8  "L3 x7 min 5 min lv3 10 min L1 7 min L3 7 min L3   Hip flexor stretch w strap         Leg press S=6 #115 2x15 m  102.5#  2x10 102.5#  2x10 102.5#   2x10   Standing hip abd/ext Standing Abd 2x10  Ext 2x10 Standing ABD 2x10  EXT 2x10  ABD 2x10  Stand EXT x15 B/L ABD 2x10  Stand EXT R 30x L 20x Standing Abd 2x10  Ext 2x10   Seated Hamstring stretch    Reviewed for HEP     Seated march w ankle weight         clamshell Blue TB B/L 2x15 W/Blue TB 2x15  Blue x15 B/L Blue 2x15 B/L Blue TB B/L 2x15            Ther Activity         Step ups/downs 8in x15 8 inch x15  6\" x15 fwd  6\" lat x10 B/L  6\" step @ stairs 15x Fwd  Lat x 10 ea   Side stepping         STS 2x10 Low mat 2x10  Low mat x20 Low mat x20 Low mat 20x                              Gait Training                           Modalities                           Reviewed patient scheduling and HEP.      "

## 2024-12-27 ENCOUNTER — TELEPHONE (OUTPATIENT)
Age: 49
End: 2024-12-27

## 2024-12-27 ENCOUNTER — APPOINTMENT (OUTPATIENT)
Dept: LAB | Facility: CLINIC | Age: 49
End: 2024-12-27
Payer: COMMERCIAL

## 2024-12-27 DIAGNOSIS — E11.9 TYPE 2 DIABETES MELLITUS WITHOUT COMPLICATION, WITHOUT LONG-TERM CURRENT USE OF INSULIN (HCC): ICD-10-CM

## 2024-12-27 DIAGNOSIS — E78.2 MIXED HYPERLIPIDEMIA: ICD-10-CM

## 2024-12-27 DIAGNOSIS — E11.9 TYPE 2 DIABETES MELLITUS WITHOUT COMPLICATION, WITHOUT LONG-TERM CURRENT USE OF INSULIN (HCC): Primary | ICD-10-CM

## 2024-12-27 LAB
ALBUMIN SERPL BCG-MCNC: 3.7 G/DL (ref 3.5–5)
ALP SERPL-CCNC: 72 U/L (ref 34–104)
ALT SERPL W P-5'-P-CCNC: 17 U/L (ref 7–52)
ANION GAP SERPL CALCULATED.3IONS-SCNC: 7 MMOL/L (ref 4–13)
AST SERPL W P-5'-P-CCNC: 13 U/L (ref 13–39)
BILIRUB SERPL-MCNC: 0.43 MG/DL (ref 0.2–1)
BUN SERPL-MCNC: 11 MG/DL (ref 5–25)
CALCIUM SERPL-MCNC: 9 MG/DL (ref 8.4–10.2)
CHLORIDE SERPL-SCNC: 104 MMOL/L (ref 96–108)
CO2 SERPL-SCNC: 24 MMOL/L (ref 21–32)
CREAT SERPL-MCNC: 0.55 MG/DL (ref 0.6–1.3)
GFR SERPL CREATININE-BSD FRML MDRD: 112 ML/MIN/1.73SQ M
GLUCOSE P FAST SERPL-MCNC: 143 MG/DL (ref 65–99)
POTASSIUM SERPL-SCNC: 4 MMOL/L (ref 3.5–5.3)
PROT SERPL-MCNC: 6.8 G/DL (ref 6.4–8.4)
SODIUM SERPL-SCNC: 135 MMOL/L (ref 135–147)

## 2024-12-27 PROCEDURE — 80053 COMPREHEN METABOLIC PANEL: CPT

## 2024-12-27 PROCEDURE — 36415 COLL VENOUS BLD VENIPUNCTURE: CPT

## 2024-12-27 NOTE — TELEPHONE ENCOUNTER
No labs were entered for patient. She needs A1C, CMP and Potassium levels. Patient at lab. Please place ASAP

## 2024-12-27 NOTE — TELEPHONE ENCOUNTER
Patient calls as she is in the lab awaiting orders for A1C and potassium. No orders in the system. Warm transfer to the office with no answer. Please advise

## 2024-12-31 ENCOUNTER — OFFICE VISIT (OUTPATIENT)
Dept: FAMILY MEDICINE CLINIC | Facility: CLINIC | Age: 49
End: 2024-12-31
Payer: COMMERCIAL

## 2024-12-31 ENCOUNTER — APPOINTMENT (OUTPATIENT)
Dept: PHYSICAL THERAPY | Facility: CLINIC | Age: 49
End: 2024-12-31
Payer: COMMERCIAL

## 2024-12-31 VITALS
SYSTOLIC BLOOD PRESSURE: 130 MMHG | HEART RATE: 112 BPM | HEIGHT: 59 IN | BODY MASS INDEX: 42.01 KG/M2 | TEMPERATURE: 97.6 F | OXYGEN SATURATION: 98 % | WEIGHT: 208.4 LBS | DIASTOLIC BLOOD PRESSURE: 84 MMHG

## 2024-12-31 DIAGNOSIS — J45.40 MODERATE PERSISTENT ASTHMA WITHOUT COMPLICATION: Chronic | ICD-10-CM

## 2024-12-31 DIAGNOSIS — J18.9 PNEUMONIA OF BOTH LUNGS DUE TO INFECTIOUS ORGANISM, UNSPECIFIED PART OF LUNG: Primary | ICD-10-CM

## 2024-12-31 DIAGNOSIS — G43.909 MIGRAINE WITHOUT STATUS MIGRAINOSUS, NOT INTRACTABLE, UNSPECIFIED MIGRAINE TYPE: ICD-10-CM

## 2024-12-31 DIAGNOSIS — B37.2 CANDIDIASIS, INTERTRIGO: ICD-10-CM

## 2024-12-31 DIAGNOSIS — E11.9 TYPE 2 DIABETES MELLITUS WITHOUT COMPLICATION, WITHOUT LONG-TERM CURRENT USE OF INSULIN (HCC): ICD-10-CM

## 2024-12-31 PROCEDURE — 99496 TRANSJ CARE MGMT HIGH F2F 7D: CPT | Performed by: NURSE PRACTITIONER

## 2024-12-31 RX ORDER — ALBUTEROL SULFATE 0.83 MG/ML
2.5 SOLUTION RESPIRATORY (INHALATION) EVERY 4 HOURS PRN
Qty: 75 ML | Refills: 1 | Status: SHIPPED | OUTPATIENT
Start: 2024-12-31

## 2024-12-31 RX ORDER — TOPIRAMATE 25 MG/1
75 TABLET, FILM COATED ORAL
Qty: 270 TABLET | Refills: 1 | Status: SHIPPED | OUTPATIENT
Start: 2024-12-31 | End: 2025-04-30

## 2024-12-31 RX ORDER — TIRZEPATIDE 2.5 MG/.5ML
INJECTION, SOLUTION SUBCUTANEOUS
COMMUNITY
Start: 2024-11-26 | End: 2024-12-31

## 2024-12-31 RX ORDER — TIRZEPATIDE 5 MG/.5ML
5 INJECTION, SOLUTION SUBCUTANEOUS WEEKLY
Qty: 2 ML | Refills: 3 | Status: SHIPPED | OUTPATIENT
Start: 2024-12-31

## 2024-12-31 RX ORDER — NYSTATIN 100000 [USP'U]/G
POWDER TOPICAL 3 TIMES DAILY
Qty: 45 G | Refills: 1 | Status: SHIPPED | OUTPATIENT
Start: 2024-12-31

## 2024-12-31 RX ORDER — ZOLMITRIPTAN 5 MG/1
TABLET, FILM COATED ORAL
Qty: 5 TABLET | Refills: 1 | Status: SHIPPED | OUTPATIENT
Start: 2024-12-31

## 2024-12-31 NOTE — ASSESSMENT & PLAN NOTE
Orders:  •  albuterol (2.5 mg/3 mL) 0.083 % nebulizer solution; Take 3 mL (2.5 mg total) by nebulization every 4 (four) hours as needed for wheezing

## 2024-12-31 NOTE — PROGRESS NOTES
Transition of Care Visit  Name: Iris Curtis      : 1975      MRN: [id number]  Encounter Provider: MARGUERITE Patel  Encounter Date: 2024   Encounter department: [facility] PRIMARY [facility]ARE    Assessment & Plan  Pneumonia of both lungs due to infectious organism, unspecified part of lung  X ray in 4 weeks.   Continue with inhalers PRN.   Please call the office if you are experiencing any worsening of symptoms or no symptom improvement.     Orders:  •  XR chest pa and lateral; Future    Type 2 diabetes mellitus without complication, without long-term current use of insulin (HCC)  Labs due end of this week/ early next week.   Increase mounjaro to 5 mg weekly.   Lab Results   Component Value Date    HGBA1C 8.5 (A) 10/03/2024     Orders:  •  Hemoglobin A1C; Future  •  Tirzepatide (Mounjaro) 5 MG/0.5ML SOAJ; Inject 5 mg under the skin once a week    Candidiasis, intertrigo    Orders:  •  nystatin (MYCOSTATIN) powder; Apply topically 3 (three) times a day    Moderate persistent asthma without complication    Orders:  •  albuterol (2.5 mg/3 mL) 0.083 % nebulizer solution; Take 3 mL (2.5 mg total) by nebulization every 4 (four) hours as needed for wheezing    Migraine without status migrainosus, not intractable, unspecified migraine type    Orders:  •  CBC and differential; Future  •  topiramate (TOPAMAX) 25 mg tablet; Take 3 tablets (75 mg total) by mouth daily at bedtime  •  ZOLMitriptan (Zomig) 5 MG tablet; Take 1 tablet at onset of headache. Max 2 tablets per week.         History of Present Illness     Transitional Care Management Review:   Iris Curtis is a 49 y.o. female here for TCM follow up.     During the TCM phone call patient stated:  TCM Call     Date and time call was made  2024  9:24 AM    Hospital care reviewed  Records reviewed    Patient was hospitialized at  [facility]    Date of Admission  24    Date of discharge  24    Diagnosis  Sepsis  "due to Pneumonia    Disposition  Home    Were the patients medications reviewed and updated  No    Current Symptoms  Shortness of breath; Cough    Cough Severity  Moderate    Shortness of breath severity  Mild      TCM Call     Post hospital issues  None    Should patient be enrolled in anticoag monitoring?  No    Scheduled for follow up?  Yes    Did you obtain your prescribed medications  Yes    Do you need help managing your prescriptions or medications  No    Is transportation to your appointment needed  No    I have advised the patient to call PCP with any new or worsening symptoms  Khushi Roche MA    Living Arrangements  Spouse or Significiant other          Patient presents the office today for hospital follow-up.  Patient was admitted to [facility] December 16 discharged on December 19.  She was admitted for sepsis due to pneumonia.  She tested positive for influenza A.  She was discharged home on 2 more days of Levaquin.  Had hypokalemia but that was replaced given  Breathing has been better.   Was without smoking for 3 weeks but recently was stressed and started again.     Review of Systems   Constitutional:  Positive for fatigue. Negative for chills and fever.   Eyes:  Negative for discharge.   Respiratory:  Negative for shortness of breath.    Cardiovascular:  Negative for chest pain.   Gastrointestinal:  Negative for constipation and diarrhea.   Genitourinary:  Negative for difficulty urinating.   Musculoskeletal:  Negative for joint swelling.   Skin:  Negative for rash.   Neurological:  Negative for headaches.   Hematological:  Negative for adenopathy.   Psychiatric/Behavioral:  The patient is not nervous/anxious.      Objective   /84   Pulse (!) 112   Temp 97.6 °F (36.4 °C) (Temporal)   Ht 4' 11\" (1.499 m)   Wt 94.5 kg (208 lb 6.4 oz)   SpO2 98%   BMI 42.09 kg/m²     Physical Exam  Vitals and nursing note reviewed.   Constitutional:       General: She is not in acute " distress.     Appearance: Normal appearance. She is well-developed. She is not diaphoretic.   HENT:      Head: Normocephalic and atraumatic.      Right Ear: External ear normal.      Left Ear: External ear normal.   Eyes:      General: Lids are normal.         Right eye: No discharge.         Left eye: No discharge.      Conjunctiva/sclera: Conjunctivae normal.   Cardiovascular:      Rate and Rhythm: Normal rate and regular rhythm.      Heart sounds: No murmur heard.  Pulmonary:      Effort: Pulmonary effort is normal. No respiratory distress.      Breath sounds: Normal breath sounds. No wheezing.   Musculoskeletal:         General: No deformity.   Skin:     General: Skin is warm and dry.   Neurological:      General: No focal deficit present.      Mental Status: She is alert and oriented to person, place, and time.   Psychiatric:         Speech: Speech normal.         Behavior: Behavior normal.         Thought Content: Thought content normal.         Judgment: Judgment normal.       Medications have been reviewed by provider in current encounter

## 2024-12-31 NOTE — ASSESSMENT & PLAN NOTE
Labs due end of this week/ early next week.   Increase mounjaro to 5 mg weekly.   Lab Results   Component Value Date    HGBA1C 8.5 (A) 10/03/2024     Orders:  •  Hemoglobin A1C; Future  •  Tirzepatide (Mounjaro) 5 MG/0.5ML SOAJ; Inject 5 mg under the skin once a week   hide

## 2024-12-31 NOTE — ASSESSMENT & PLAN NOTE
Orders:  •  CBC and differential; Future  •  topiramate (TOPAMAX) 25 mg tablet; Take 3 tablets (75 mg total) by mouth daily at bedtime  •  ZOLMitriptan (Zomig) 5 MG tablet; Take 1 tablet at onset of headache. Max 2 tablets per week.

## 2024-12-31 NOTE — PATIENT INSTRUCTIONS
X ray in 4 weeks.     Labs due end of this week/ early next week.     Increase mounjaro to 5 mg weekly.         Please call the office if you are experiencing any worsening of symptoms or no symptom improvement.

## 2025-01-02 ENCOUNTER — OFFICE VISIT (OUTPATIENT)
Dept: PHYSICAL THERAPY | Facility: CLINIC | Age: 50
End: 2025-01-02
Payer: COMMERCIAL

## 2025-01-02 DIAGNOSIS — M17.0 PRIMARY OSTEOARTHRITIS OF BOTH KNEES: Primary | ICD-10-CM

## 2025-01-02 DIAGNOSIS — M25.562 BILATERAL CHRONIC KNEE PAIN: ICD-10-CM

## 2025-01-02 DIAGNOSIS — G89.29 BILATERAL CHRONIC KNEE PAIN: ICD-10-CM

## 2025-01-02 DIAGNOSIS — M25.561 BILATERAL CHRONIC KNEE PAIN: ICD-10-CM

## 2025-01-02 PROCEDURE — 97530 THERAPEUTIC ACTIVITIES: CPT

## 2025-01-02 PROCEDURE — 97112 NEUROMUSCULAR REEDUCATION: CPT

## 2025-01-02 PROCEDURE — 97110 THERAPEUTIC EXERCISES: CPT

## 2025-01-02 NOTE — PROGRESS NOTES
Daily Note     Today's date: 2025  Patient name: Iris Curtis  : 1975  MRN: [id number]  Referring provider: Thomas Torres PA-C  Dx:   Encounter Diagnosis     ICD-10-CM    1. Primary osteoarthritis of both knees  M17.0       2. Bilateral chronic knee pain  M25.561     M25.562     G89.29           Start Time: 08  Stop Time: 911  Total time in clinic (min): 48 minutes    Subjective: Patient reports that she is fatigued and still recovering from her recent episode of physical decline.       Objective: See treatment diary below      Assessment: Tolerated treatment well. Reintroduced therapeutic exercise to tolerance this visit. Pt continues to require frequent rests throughout tx due to overall fatigue. Pt noted feeling good post tx despite mild fatigue. HEP and DOMS were both reviewed. Patient demonstrated fatigue post treatment and would benefit from continued PT.       Plan: Continue per plan of care.  Progress treatment as tolerated.       Precautions: none noted  POC: 25  Re-Eval: 25  Access Code: [email or web address]   Manuals 12/3 12/5 12/24 12/26 1/2 11/29   Knee mobs         Knee PROM                           Neuro Re-Ed         LAQ w pause #4 2x15 4# 2x15  4# 2x15 2x15 no weight  4# B/L 2x15   Airex SL balance         bridge W/Blue TB 2x10 W/Blue TB 2x10  Blue band 2x10 Blue band 2x10 W/Blue TB 2x10   TKE    GRN x10 B/L GRN x10 B/L GTB 10x B/L   Lunge stretch    At steps x15 B/L At steps x15 B/L At stairs 15x B/L   Quad set         SLR         Patient education   PM performed 10 min        Ther Ex         Nu-step (cardio endurance) 7 min lv 3 S=8 L3 x7 min 5 min lv3 10 min L1 6 min L4 7 min L3   Hip flexor stretch w strap         Leg press S=6 #115 2x15 m  102.5#  2x10 Np fatigue  102.5#   2x10   Standing hip abd/ext Standing Abd 2x10  Ext 2x10 Standing ABD 2x10  EXT 2x10  ABD 2x10  Stand EXT x15 B/L Standing ABD 2x10  EXT 2x10 Standing Abd 2x10  Ext 2x10   Seated Hamstring stretch     "Reviewed for HEP     Seated march w ankle weight         clamshell Blue TB B/L 2x15 W/Blue TB 2x15  Blue x15 B/L Blue 2x15 B/L Blue TB B/L 2x15            Ther Activity         Step ups/downs 8in x15 8 inch x15  6\" x15 fwd  6\" lat x10 B/L 6\" x20 fwd  6\" lat x10 B/L 6\" step @ stairs 15x Fwd  Lat x 10 ea   Side stepping         STS 2x10 Low mat 2x10  Low mat x20 Low mat 2x5 Low mat 20x                              Gait Training                           Modalities                           Reviewed patient scheduling and HEP.      "

## 2025-01-03 ENCOUNTER — APPOINTMENT (OUTPATIENT)
Dept: LAB | Facility: CLINIC | Age: 50
End: 2025-01-03
Payer: COMMERCIAL

## 2025-01-03 DIAGNOSIS — E11.9 TYPE 2 DIABETES MELLITUS WITHOUT COMPLICATION, WITHOUT LONG-TERM CURRENT USE OF INSULIN (HCC): ICD-10-CM

## 2025-01-03 DIAGNOSIS — G43.909 MIGRAINE WITHOUT STATUS MIGRAINOSUS, NOT INTRACTABLE, UNSPECIFIED MIGRAINE TYPE: ICD-10-CM

## 2025-01-03 LAB
BASOPHILS # BLD AUTO: 0.03 THOUSANDS/ΜL (ref 0–0.1)
BASOPHILS NFR BLD AUTO: 0 % (ref 0–1)
EOSINOPHIL # BLD AUTO: 0.28 THOUSAND/ΜL (ref 0–0.61)
EOSINOPHIL NFR BLD AUTO: 2 % (ref 0–6)
ERYTHROCYTE [DISTWIDTH] IN BLOOD BY AUTOMATED COUNT: 19.9 % (ref 11.6–15.1)
EST. AVERAGE GLUCOSE BLD GHB EST-MCNC: 174 MG/DL
HBA1C MFR BLD: 7.7 %
HCT VFR BLD AUTO: 40.5 % (ref 34.8–46.1)
HGB BLD-MCNC: 12.2 G/DL (ref 11.5–15.4)
IMM GRANULOCYTES # BLD AUTO: 0.09 THOUSAND/UL (ref 0–0.2)
IMM GRANULOCYTES NFR BLD AUTO: 1 % (ref 0–2)
LYMPHOCYTES # BLD AUTO: 2.03 THOUSANDS/ΜL (ref 0.6–4.47)
LYMPHOCYTES NFR BLD AUTO: 17 % (ref 14–44)
MCH RBC QN AUTO: 23.3 PG (ref 26.8–34.3)
MCHC RBC AUTO-ENTMCNC: 30.1 G/DL (ref 31.4–37.4)
MCV RBC AUTO: 77 FL (ref 82–98)
MONOCYTES # BLD AUTO: 0.48 THOUSAND/ΜL (ref 0.17–1.22)
MONOCYTES NFR BLD AUTO: 4 % (ref 4–12)
NEUTROPHILS # BLD AUTO: 9.09 THOUSANDS/ΜL (ref 1.85–7.62)
NEUTS SEG NFR BLD AUTO: 76 % (ref 43–75)
NRBC BLD AUTO-RTO: 0 /100 WBCS
PLATELET # BLD AUTO: 414 THOUSANDS/UL (ref 149–390)
PMV BLD AUTO: 9.1 FL (ref 8.9–12.7)
RBC # BLD AUTO: 5.24 MILLION/UL (ref 3.81–5.12)
WBC # BLD AUTO: 12 THOUSAND/UL (ref 4.31–10.16)

## 2025-01-03 PROCEDURE — 36415 COLL VENOUS BLD VENIPUNCTURE: CPT

## 2025-01-03 PROCEDURE — 83036 HEMOGLOBIN GLYCOSYLATED A1C: CPT

## 2025-01-03 PROCEDURE — 85025 COMPLETE CBC W/AUTO DIFF WBC: CPT

## 2025-01-07 ENCOUNTER — APPOINTMENT (OUTPATIENT)
Dept: PHYSICAL THERAPY | Facility: CLINIC | Age: 50
End: 2025-01-07
Payer: COMMERCIAL

## 2025-01-07 ENCOUNTER — RESULTS FOLLOW-UP (OUTPATIENT)
Dept: FAMILY MEDICINE CLINIC | Facility: CLINIC | Age: 50
End: 2025-01-07

## 2025-01-09 ENCOUNTER — OFFICE VISIT (OUTPATIENT)
Dept: PHYSICAL THERAPY | Facility: CLINIC | Age: 50
End: 2025-01-09
Payer: COMMERCIAL

## 2025-01-09 DIAGNOSIS — G89.29 BILATERAL CHRONIC KNEE PAIN: ICD-10-CM

## 2025-01-09 DIAGNOSIS — M25.562 BILATERAL CHRONIC KNEE PAIN: ICD-10-CM

## 2025-01-09 DIAGNOSIS — M17.0 PRIMARY OSTEOARTHRITIS OF BOTH KNEES: Primary | ICD-10-CM

## 2025-01-09 DIAGNOSIS — M25.561 BILATERAL CHRONIC KNEE PAIN: ICD-10-CM

## 2025-01-09 PROCEDURE — 97110 THERAPEUTIC EXERCISES: CPT

## 2025-01-09 PROCEDURE — 97112 NEUROMUSCULAR REEDUCATION: CPT

## 2025-01-09 PROCEDURE — 97530 THERAPEUTIC ACTIVITIES: CPT

## 2025-01-09 NOTE — PROGRESS NOTES
Daily Note     Today's date: 2025  Patient name: Iris Curtis  : 1975  MRN: [id number]  Referring provider: Thomas Torres PA-C  Dx:   Encounter Diagnosis     ICD-10-CM    1. Primary osteoarthritis of both knees  M17.0       2. Bilateral chronic knee pain  M25.561     M25.562     G89.29           Start Time: 822          Subjective: Patient states her right knee is a little more irritated then the left. The cold weather effects her knee. She reports getting closer to the levels so she can have her surgery for her knees.       Objective: See treatment diary below      Assessment: Tolerated treatment well. Patient would benefit from continued PT for strengthening. She was able to reintroduce her leg press during session. Patient felt tired by the end of the session.       Plan: Continue per plan of care.  Progress treatment as tolerated.       Precautions: none noted  POC: 25  Re-Eval: 25  Access Code: [email or web address]   Manuals 12/3 12/5 12/24 12/26 1/2 1/9   Knee mobs         Knee PROM                           Neuro Re-Ed         LAQ w pause #4 2x15 4# 2x15  4# 2x15 2x15 no weight  2x15   Airex SL balance         bridge W/Blue TB 2x10 W/Blue TB 2x10  Blue band 2x10 Blue band 2x10 Blue band 2x10   TKE    GRN x10 B/L GRN x10 B/L Green x10 B/L   Lunge stretch    At steps x15 B/L At steps x15 B/L At step x20 B/L   Quad set         SLR         Patient education   PM performed 10 min        Ther Ex         Nu-step (cardio endurance) 7 min lv 3 S=8 L3 x7 min 5 min lv3 10 min L1 6 min L4 L1 x10 min   Hip flexor stretch w strap         Leg press S=6 #115 2x15   102.5#  2x10 Np fatigue  102.5 # 2x10   Standing hip abd/ext Standing Abd 2x10  Ext 2x10 Standing ABD 2x10  EXT 2x10  ABD 2x10  Stand EXT x15 B/L Standing ABD 2x10  EXT 2x10 Standing   ABD 2x10  Ext 2x10   Seated Hamstring stretch    Reviewed for HEP      ankle weight         clamshell Blue TB B/L 2x15 W/Blue TB 2x15  Blue x15 B/L  "Blue 2x15 B/L Blue 2x15 B/L            Ther Activity         Step ups/downs 8in x15 8 inch x15  6\" x15 fwd  6\" lat x10 B/L 6\" x20 fwd  6\" lat x10 B/L 6\" x20 fwd  6\" lat x10 B/L   Side stepping         STS 2x10 Low mat 2x10  Low mat x20 Low mat 2x5 Low mat 2x5                              Gait Training                           Modalities                                  "

## 2025-01-10 DIAGNOSIS — E78.5 HYPERLIPIDEMIA, UNSPECIFIED HYPERLIPIDEMIA TYPE: ICD-10-CM

## 2025-01-10 DIAGNOSIS — E11.9 TYPE 2 DIABETES MELLITUS WITHOUT COMPLICATION, WITHOUT LONG-TERM CURRENT USE OF INSULIN (HCC): ICD-10-CM

## 2025-01-10 DIAGNOSIS — E66.01 MORBID OBESITY (HCC): ICD-10-CM

## 2025-01-10 RX ORDER — LOSARTAN POTASSIUM 25 MG/1
25 TABLET ORAL DAILY
Qty: 90 TABLET | Refills: 1 | Status: SHIPPED | OUTPATIENT
Start: 2025-01-10

## 2025-01-10 RX ORDER — METFORMIN HYDROCHLORIDE 500 MG/1
TABLET, EXTENDED RELEASE ORAL
Qty: 180 TABLET | Refills: 1 | Status: SHIPPED | OUTPATIENT
Start: 2025-01-10

## 2025-01-10 RX ORDER — ROSUVASTATIN CALCIUM 10 MG/1
10 TABLET, COATED ORAL DAILY
Qty: 90 TABLET | Refills: 1 | Status: SHIPPED | OUTPATIENT
Start: 2025-01-10

## 2025-01-14 ENCOUNTER — OFFICE VISIT (OUTPATIENT)
Dept: PHYSICAL THERAPY | Facility: CLINIC | Age: 50
End: 2025-01-14
Payer: COMMERCIAL

## 2025-01-14 DIAGNOSIS — G89.29 BILATERAL CHRONIC KNEE PAIN: ICD-10-CM

## 2025-01-14 DIAGNOSIS — M17.0 PRIMARY OSTEOARTHRITIS OF BOTH KNEES: Primary | ICD-10-CM

## 2025-01-14 DIAGNOSIS — M25.562 BILATERAL CHRONIC KNEE PAIN: ICD-10-CM

## 2025-01-14 DIAGNOSIS — M25.561 BILATERAL CHRONIC KNEE PAIN: ICD-10-CM

## 2025-01-14 PROCEDURE — 97110 THERAPEUTIC EXERCISES: CPT

## 2025-01-14 PROCEDURE — 97112 NEUROMUSCULAR REEDUCATION: CPT

## 2025-01-14 NOTE — PROGRESS NOTES
Daily Note     Today's date: 2025  Patient name: Iris Curtis  : 1975  MRN: [id number]  Referring provider: Thomas Torres PA-C  Dx:   Encounter Diagnosis     ICD-10-CM    1. Primary osteoarthritis of both knees  M17.0       2. Bilateral chronic knee pain  M25.561     M25.562     G89.29           Start Time: 0945  Stop Time: 1030  Total time in clinic (min): 45 minutes    Subjective: Patient reports about 4/10 pain in both knees today. Reports she is feeling like she is able to independently manage her symptoms before her upcoming TKA.      Objective: See treatment diary below      Assessment: Patient able to complete all exercises without exacerbation of symptoms. Instructed on HEP leading up to elective TKA procedures she will have once her HbA1C values are under control. Required vcing and tactile cues to not rotate trunk with sidelying clamshells to emphasis glute activation.      Plan: Continue per plan of care.  Progress treatment as tolerated.       Precautions: none noted  POC: 25  Re-Eval: 25  Access Code: [email or web address]   Manuals    Knee mobs         Knee PROM                           Neuro Re-Ed         LAQ w pause  4# 2x15  4# 2x15 2x15 no weight  2x15   Airex SL balance         bridge W/Blue TB 2x15 W/Blue TB 2x10  Blue band 2x10 Blue band 2x10 Blue band 2x10   TKE    GRN x10 B/L GRN x10 B/L Green x10 B/L   Lunge stretch    At steps x15 B/L At steps x15 B/L At step x20 B/L   Quad set/glute set :05x10 B/L        SLR         Patient education HEP update  PM performed 10 min        Ther Ex         Nu-step (cardio endurance) 9 min lv 3 S=8 L3 x7 min 5 min lv3 10 min L1 6 min L4 L1 x10 min   Hip flexor stretch w strap Heel slides x10 B/L        Leg press S=6 #115 2x15   102.5#  2x10 Np fatigue  102.5 # 2x10   Standing hip abd/ext  Standing ABD 2x10  EXT 2x10  ABD 2x10  Stand EXT x15 B/L Standing ABD 2x10  EXT 2x10 Standing   ABD 2x10  Ext 2x10   Seated  "Hamstring stretch    Reviewed for HEP     Seated march w ankle weight         clamshell Blue TB B/L 2x15 W/Blue TB 2x15  Blue x15 B/L Blue 2x15 B/L Blue 2x15 B/L            Ther Activity         Step ups/downs 8in x15 8 inch x15  6\" x15 fwd  6\" lat x10 B/L 6\" x20 fwd  6\" lat x10 B/L 6\" x20 fwd  6\" lat x10 B/L   Side stepping         STS  Low mat 2x10  Low mat x20 Low mat 2x5 Low mat 2x5                              Gait Training                           Modalities                                    "

## 2025-01-16 DIAGNOSIS — M17.0 PRIMARY OSTEOARTHRITIS OF BOTH KNEES: ICD-10-CM

## 2025-01-16 PROBLEM — A41.9 SEPSIS DUE TO PNEUMONIA (HCC): Status: RESOLVED | Noted: 2024-12-17 | Resolved: 2025-01-16

## 2025-01-16 PROBLEM — J10.1 INFLUENZA A: Status: RESOLVED | Noted: 2024-12-17 | Resolved: 2025-01-16

## 2025-01-16 PROBLEM — J18.9 SEPSIS DUE TO PNEUMONIA (HCC): Status: RESOLVED | Noted: 2024-12-17 | Resolved: 2025-01-16

## 2025-01-16 RX ORDER — MELOXICAM 15 MG/1
15 TABLET ORAL DAILY
Qty: 30 TABLET | Refills: 0 | Status: SHIPPED | OUTPATIENT
Start: 2025-01-16

## 2025-01-20 DIAGNOSIS — J30.2 SEASONAL ALLERGIES: ICD-10-CM

## 2025-01-20 NOTE — PROGRESS NOTES
PT Re-Evaluation  and PT Discharge    Today's date: 2025  Patient name: Iris Curtis  : 1975  MRN: [id number]  Referring provider: Thomas Torres PA-C  Dx:   Encounter Diagnosis     ICD-10-CM    1. Bilateral chronic knee pain  M25.561     M25.562     G89.29       2. Primary osteoarthritis of both knees  M17.0                 Start Time: 905  Stop Time: 945  Total time in clinic (min): 40 minutes    Assessment    Assessment details: Iris Curtis is a 49 y.o. female presents to physical therapy with a history of chronic bilateral knee pain secondary to knee OA. Patient has attended initial eval and 19 f/u sessions which was disrupted by hospital admission due to pneumonia. Patient demonstrates overall improved strength and no regression of bilateral knee A/PROM. Patient is independent with HEP and will benefit from discharge to manage symptoms before she is cleared for elective R TKA at future date.    Understanding of Dx/Px/POC: good     Prognosis: good  Prognosis details: Patient is pleasant and motivated to improve function. Demonstrates verbal understanding of POC and able to perform HEP.     Goals  STG (6 weeks)    STG: Pt to improve pain from 8/10 to 5/10 at its worst. NOT MET    STG: Pt to improve bilateral knee ext MMT to at least 4/5 to help with reciprocal gait pattern on stairs. MET    STG: Pt to improve 30 sec STS to at least 10 reps without UE support to improve LE strength and reduce risk for falls. MET    LTG (12 weeks)    LTG: Pt to be able to perform light activities around the house with little difficulty to improve independence with ADL's. MET    LTG: Pt to be able to walk a mile with moderate difficulty to be able to return to walking routine with less discomfort. MET    LTG: Pt to be independent with HEP to continue to increase strength and mobility to improve overall function. MET    Plan    Duration in weeks: 12  Plan of Care beginning date: 10/22/2024  Plan of Care  expiration date: 1/22/2025  Treatment plan discussed with: patient  Plan details: Discussed plan for discharge with HEP and given home setup checklist to prepare for elective TKA.      Subjective Evaluation    History of Present Illness  Mechanism of injury: SUBJECTIVE 1/21/25: Iris Curtis is a 49 y.o. female presents to OPPT with complaints of chronic bilateral knee pain R>L. Patient notes that she is still having difficulty and pain with most activities. She is expected to have elective R TKA once her diabetes is under control and thinks she is at a point she can independently manage symptoms until operation.    SUBJECTIVE 12/24/24: iris Curtis is a 49 y.o. female presents to OPPT with complaints of chronic bilateral knee pain R>L. Patient recently was admitted to hospital due pneumonia from the flu on 12/16/24-12/19/24. Since then, patient has been too fatigued to do workouts. She has been resting to get over illness. She is feeling very weak and tired.    SUBJECTIVE 11/18/24 Iris Curtis is a 49 y.o. female presents to OPPT with complaints of progressive worsening bilateral knee pain R>L. Since starting patient reports that her endurance has improved and able to get up the stairs a little easier. Still having difficulty with standing activities due to bilateral knee OA.     INJURY HISTORY 10/22/24: Iris Curtis is a 49 y.o. female presents to OPPT with complaints of progressive worsening bilateral knee pain R>L. Right knee has been bothering her for a few years while the L knee has started hurting more in the last 2-3 months and has been clicking, grinding and locking. L knee has difficulty fully extending, and R knee has more difficulty bending. Unable to walk more than a block without assistance due to knee pain and weakness.  Patient Goals  Patient goals for therapy: increased strength, independence with ADLs/IADLs, decreased pain, increased motion and improved balance  Patient goal:  wants to be able to hike and go for walks  Pain  Current pain ratin  At best pain rating: 3  At worst pain ratin  Location: BL knees  Quality: sharp and dull ache    Social Support  Steps to enter house: yes  2  Stairs in house: yes   20      Diagnostic Tests  X-ray: abnormal    FCE comments: Bilateral knee OA    Objective     Active Range of Motion   Left Knee   Flexion: 125 degrees with pain  Extension: -5 degrees with pain    Right Knee   Flexion: 120 degrees with pain  Extension: 0 degrees with pain    Mobility   Patellar Mobility:   Left Knee   Hypomobile: left medial, left lateral, left superior and left inferior    Right Knee   Hypomobile: medial, lateral, superior and inferior     Strength/Myotome Testing     Left Hip   Planes of Motion   Flexion: 4+  Abduction: 4  Adduction: 4  External rotation: 4  Internal rotation: 5    Right Hip   Planes of Motion   Flexion: 4+  Abduction: 4  Adduction: 4  External rotation: 5  Internal rotation: 5    Left Knee   Flexion: 5  Extension: 5    Right Knee   Flexion: 5  Extension: 5    Left Ankle/Foot   Dorsiflexion: 4    Right Ankle/Foot   Dorsiflexion: 4    Tests     Additional Tests Details  30 sec STS: 10 reps    TUG: 10 sec    SL balance R: 18 sec  SL balance L: 30 sec    Ambulation     Ambulation: Stairs   Ascend stairs: independent  Pattern: non-reciprocal  Railings: two rails  Descend stairs: independent  Pattern: non-reciprocal  Railings: two rails         Precautions: none noted  POC: 25  Re-Eval: 25  Access Code: [email or web address]   Manuals    Knee mobs  PM performed grade III-IV       Knee PROM                           Neuro Re-Ed         LAQ w pause    4# 2x15 2x15 no weight  2x15   Airex SL balance         bridge W/Blue TB 2x15 W/Blue TB 2x10  Blue band 2x10 Blue band 2x10 Blue band 2x10   TKE    GRN x10 B/L GRN x10 B/L Green x10 B/L   Lunge stretch    At steps x15 B/L At steps x15 B/L At step x20 B/L   Quad set/glute set  ":05x10 B/L :05x10 B/L       SLR         Patient education HEP update HEP update and home setup checklist PM performed 10 min        Ther Ex         Nu-step (cardio endurance) 9 min lv 3 S=8 L3 x10 min 5 min lv3 10 min L1 6 min L4 L1 x10 min   Hip flexor stretch w strap Heel slides x10 B/L Heel slides x10 B/L       Leg press S=6 #115 2x15 #115 2x15  102.5#  2x10 Np fatigue  102.5 # 2x10   Standing hip abd/ext    ABD 2x10  Stand EXT x15 B/L Standing ABD 2x10  EXT 2x10 Standing   ABD 2x10  Ext 2x10   Seated Hamstring stretch    Reviewed for HEP     Seated march w ankle weight         clamshell Blue TB B/L 2x15 W/Blue TB 2x15  Blue x15 B/L Blue 2x15 B/L Blue 2x15 B/L            Ther Activity         Step ups/downs 8in x15 Stairs x5  6\" x15 fwd  6\" lat x10 B/L 6\" x20 fwd  6\" lat x10 B/L 6\" x20 fwd  6\" lat x10 B/L   Side stepping         STS    Low mat x20 Low mat 2x5 Low mat 2x5                              Gait Training                           Modalities                           Reviewed patient scheduling and HEP.      "

## 2025-01-21 ENCOUNTER — EVALUATION (OUTPATIENT)
Dept: PHYSICAL THERAPY | Facility: CLINIC | Age: 50
End: 2025-01-21
Payer: COMMERCIAL

## 2025-01-21 DIAGNOSIS — M25.561 BILATERAL CHRONIC KNEE PAIN: Primary | ICD-10-CM

## 2025-01-21 DIAGNOSIS — G89.29 BILATERAL CHRONIC KNEE PAIN: Primary | ICD-10-CM

## 2025-01-21 DIAGNOSIS — M17.0 PRIMARY OSTEOARTHRITIS OF BOTH KNEES: ICD-10-CM

## 2025-01-21 DIAGNOSIS — M25.562 BILATERAL CHRONIC KNEE PAIN: Primary | ICD-10-CM

## 2025-01-21 PROCEDURE — 97112 NEUROMUSCULAR REEDUCATION: CPT

## 2025-01-21 PROCEDURE — 97110 THERAPEUTIC EXERCISES: CPT

## 2025-01-21 PROCEDURE — 97140 MANUAL THERAPY 1/> REGIONS: CPT

## 2025-01-21 RX ORDER — FLUTICASONE PROPIONATE 50 MCG
SPRAY, SUSPENSION (ML) NASAL
Qty: 48 ML | Refills: 1 | Status: SHIPPED | OUTPATIENT
Start: 2025-01-21

## 2025-02-12 DIAGNOSIS — M17.0 PRIMARY OSTEOARTHRITIS OF BOTH KNEES: ICD-10-CM

## 2025-02-12 RX ORDER — MELOXICAM 15 MG/1
15 TABLET ORAL DAILY
Qty: 30 TABLET | Refills: 5 | Status: SHIPPED | OUTPATIENT
Start: 2025-02-12

## 2025-03-31 ENCOUNTER — OFFICE VISIT (OUTPATIENT)
Dept: FAMILY MEDICINE CLINIC | Facility: CLINIC | Age: 50
End: 2025-03-31
Payer: COMMERCIAL

## 2025-03-31 ENCOUNTER — TELEPHONE (OUTPATIENT)
Dept: FAMILY MEDICINE CLINIC | Facility: CLINIC | Age: 50
End: 2025-03-31

## 2025-03-31 VITALS
TEMPERATURE: 97.9 F | SYSTOLIC BLOOD PRESSURE: 130 MMHG | HEART RATE: 110 BPM | DIASTOLIC BLOOD PRESSURE: 84 MMHG | WEIGHT: 193.8 LBS | BODY MASS INDEX: 39.07 KG/M2 | OXYGEN SATURATION: 99 % | HEIGHT: 59 IN

## 2025-03-31 DIAGNOSIS — K21.9 GASTROESOPHAGEAL REFLUX DISEASE: ICD-10-CM

## 2025-03-31 DIAGNOSIS — M25.562 CHRONIC PAIN OF LEFT KNEE: ICD-10-CM

## 2025-03-31 DIAGNOSIS — R19.7 DIARRHEA, UNSPECIFIED TYPE: ICD-10-CM

## 2025-03-31 DIAGNOSIS — G89.29 CHRONIC PAIN OF LEFT KNEE: ICD-10-CM

## 2025-03-31 DIAGNOSIS — I10 ACCELERATED HYPERTENSION: ICD-10-CM

## 2025-03-31 DIAGNOSIS — E11.9 TYPE 2 DIABETES MELLITUS WITHOUT COMPLICATION, WITHOUT LONG-TERM CURRENT USE OF INSULIN (HCC): Primary | ICD-10-CM

## 2025-03-31 DIAGNOSIS — G89.29 CHRONIC PAIN OF RIGHT KNEE: ICD-10-CM

## 2025-03-31 DIAGNOSIS — J45.40 MODERATE PERSISTENT ASTHMA WITHOUT COMPLICATION: ICD-10-CM

## 2025-03-31 DIAGNOSIS — E11.9 TYPE 2 DIABETES MELLITUS WITHOUT COMPLICATION, WITHOUT LONG-TERM CURRENT USE OF INSULIN (HCC): ICD-10-CM

## 2025-03-31 DIAGNOSIS — M25.561 CHRONIC PAIN OF RIGHT KNEE: ICD-10-CM

## 2025-03-31 LAB — SL AMB POCT HEMOGLOBIN AIC: 6.2 (ref ?–6.5)

## 2025-03-31 PROCEDURE — 99214 OFFICE O/P EST MOD 30 MIN: CPT | Performed by: NURSE PRACTITIONER

## 2025-03-31 PROCEDURE — 83036 HEMOGLOBIN GLYCOSYLATED A1C: CPT | Performed by: NURSE PRACTITIONER

## 2025-03-31 RX ORDER — OMEPRAZOLE 40 MG/1
40 CAPSULE, DELAYED RELEASE ORAL DAILY
Qty: 90 CAPSULE | Refills: 1 | Status: SHIPPED | OUTPATIENT
Start: 2025-03-31

## 2025-03-31 RX ORDER — METOPROLOL SUCCINATE 50 MG/1
50 TABLET, EXTENDED RELEASE ORAL DAILY
Qty: 90 TABLET | Refills: 1 | Status: SHIPPED | OUTPATIENT
Start: 2025-03-31

## 2025-03-31 NOTE — ASSESSMENT & PLAN NOTE
Increase to 40 mg daily   If no improvement f/u with GI     Orders:  •  omeprazole (PriLOSEC) 40 MG capsule; Take 1 capsule (40 mg total) by mouth daily

## 2025-03-31 NOTE — PATIENT INSTRUCTIONS
Follows with ortho.     Increase protein.     Increase dietary fiber. Stay hydrated. Small frequent meals. Avoid any fatty/ greasy meals.     Please call the office if you are experiencing any worsening of symptoms or no symptom improvement.

## 2025-03-31 NOTE — ASSESSMENT & PLAN NOTE
A1C updated today in office.   At goal. Continue with same regimen. No adjustments made at this time.   Lab Results   Component Value Date    HGBA1C 6.2 03/31/2025       Orders:  •  POCT hemoglobin A1c  •  Hemoglobin A1C; Future  •  Lipid Panel with Direct LDL reflex; Future  •  Comprehensive metabolic panel; Future

## 2025-03-31 NOTE — PROGRESS NOTES
Name: Iris Curtis      : 1975      MRN: [id number]  Encounter Provider: MARGUERITE Patel  Encounter Date: 3/31/2025   Encounter department: [facility] PRIMARY [facility]  :  Assessment & Plan  Type 2 diabetes mellitus without complication, without long-term current use of insulin (HCC)  A1C updated today in office.   At goal. Continue with same regimen. No adjustments made at this time.   Lab Results   Component Value Date    HGBA1C 6.2 2025       Orders:  •  POCT hemoglobin A1c  •  Hemoglobin A1C; Future  •  Lipid Panel with Direct LDL reflex; Future  •  Comprehensive metabolic panel; Future    Accelerated hypertension  Stable on current medications.   If weight continues to trend down could consider decreasing medications.  Follow-up closely.    Orders:  •  metoprolol succinate (TOPROL-XL) 50 mg 24 hr tablet; Take 1 tablet (50 mg total) by mouth daily    Moderate persistent asthma without complication  Stable at this time on current regimen        Diarrhea, unspecified type  Update labs.  Advised to avoid any greasy/fatty foods while taking the GLP.  Will not change dose at this time if related.  Stay well-hydrated.  If labs stable could consider stool testing  Follow up with GI if continues   Orders:  •  Comprehensive metabolic panel; Future  •  Lipase; Future  •  Amylase; Future    Gastroesophageal reflux disease  Increase to 40 mg daily   If no improvement f/u with GI     Orders:  •  omeprazole (PriLOSEC) 40 MG capsule; Take 1 capsule (40 mg total) by mouth daily    Chronic pain of right knee  Continue to follow with Ortho and PT       Chronic pain of left knee  Continue to follow with Ortho and PT              History of Present Illness   Patient presents with:  Follow-up: Diabetes and weight loss    Plan 3 months ago:  Labs due end of this week/ early next week.   Increase mounjaro to 5 mg weekly.   Lab Results  Component Value Date    HGBA1C 8.5 (A) 10/03/2024  Lab  "Results       Component                Value               Date                       HGBA1C                   7.7 (H)             01/03/2025                Watery stool since she started GLP per patient- not consistent it will improve at times. No changes to diet.     Has to see ortho provider- continues with knee pain. Needs A1c < 7 for surgery.           Review of Systems   Constitutional:  Negative for chills and fever.   Eyes:  Negative for discharge.   Respiratory:  Negative for shortness of breath.    Cardiovascular:  Negative for chest pain.   Gastrointestinal:  Positive for diarrhea. Negative for constipation.   Genitourinary:  Negative for difficulty urinating.   Musculoskeletal:  Negative for joint swelling.   Skin:  Negative for rash.   Neurological:  Negative for headaches.   Hematological:  Negative for adenopathy.   Psychiatric/Behavioral:  The patient is not nervous/anxious.        Objective   /84 (BP Location: Left arm, Patient Position: Sitting, Cuff Size: Large)   Pulse (!) 110   Temp 97.9 °F (36.6 °C) (Temporal)   Ht 4' 11\" (1.499 m)   Wt 87.9 kg (193 lb 12.8 oz)   LMP 03/29/2025 (Exact Date)   SpO2 99%   BMI 39.14 kg/m²      Physical Exam  Vitals and nursing note reviewed.   Constitutional:       General: She is not in acute distress.     Appearance: Normal appearance. She is well-developed. She is not diaphoretic.   HENT:      Head: Normocephalic and atraumatic.      Right Ear: External ear normal.      Left Ear: External ear normal.   Eyes:      General: Lids are normal.         Right eye: No discharge.         Left eye: No discharge.      Conjunctiva/sclera: Conjunctivae normal.   Pulmonary:      Effort: Pulmonary effort is normal. No respiratory distress.   Musculoskeletal:         General: No deformity.   Skin:     General: Skin is warm and dry.   Neurological:      General: No focal deficit present.      Mental Status: She is alert and oriented to person, place, and time. "   Psychiatric:         Speech: Speech normal.         Behavior: Behavior normal.         Thought Content: Thought content normal.         Judgment: Judgment normal.

## 2025-03-31 NOTE — TELEPHONE ENCOUNTER
I called and spoke with the patient and made her aware she verbalized understanding and agreement

## 2025-03-31 NOTE — TELEPHONE ENCOUNTER
----- Message from MARGUERITE Hodgson sent at 3/31/2025  9:06 AM EDT -----  Please notify patient A1c was 6.2.  Continue with current medication doses.  No changes

## 2025-04-01 RX ORDER — TIRZEPATIDE 5 MG/.5ML
5 INJECTION, SOLUTION SUBCUTANEOUS WEEKLY
Qty: 2 ML | Refills: 3 | Status: SHIPPED | OUTPATIENT
Start: 2025-04-01

## 2025-04-03 DIAGNOSIS — F41.9 ANXIETY: ICD-10-CM

## 2025-04-04 RX ORDER — BUSPIRONE HYDROCHLORIDE 5 MG/1
5 TABLET ORAL 2 TIMES DAILY
Qty: 180 TABLET | Refills: 1 | Status: SHIPPED | OUTPATIENT
Start: 2025-04-04

## 2025-04-09 ENCOUNTER — OFFICE VISIT (OUTPATIENT)
Dept: OBGYN CLINIC | Facility: CLINIC | Age: 50
End: 2025-04-09
Payer: COMMERCIAL

## 2025-04-09 VITALS — WEIGHT: 192 LBS | BODY MASS INDEX: 38.71 KG/M2 | HEIGHT: 59 IN

## 2025-04-09 DIAGNOSIS — M17.0 PRIMARY OSTEOARTHRITIS OF BOTH KNEES: Primary | ICD-10-CM

## 2025-04-09 PROCEDURE — 99213 OFFICE O/P EST LOW 20 MIN: CPT | Performed by: STUDENT IN AN ORGANIZED HEALTH CARE EDUCATION/TRAINING PROGRAM

## 2025-04-09 RX ORDER — LIDOCAINE 50 MG/G
1 PATCH TOPICAL DAILY
Qty: 14 PATCH | Refills: 0 | Status: SHIPPED | OUTPATIENT
Start: 2025-04-09

## 2025-04-09 NOTE — PROGRESS NOTES
Ortho Sports Medicine Clinic Visit       Assessment & Plan  Primary osteoarthritis of both knees    Orders:    Ambulatory Referral to Orthopedic Surgery; Future    Diclofenac Sodium (VOLTAREN) 1 %; Apply 2 g topically 4 (four) times a day    lidocaine (Lidoderm) 5 %; Apply 1 patch topically over 12 hours daily Remove & Discard patch within 12 hours or as directed by MD    Reviewed history, physical exam, and imaging with the patient at time of visit.  Patient has known bilateral osteoarthritis and has failed conservative treatment including over-the-counter Tylenol, Aleve, meloxicam, and cortisone injections.  Patient has been working on her BMI to be eligible for a total knee replacement.  Discussed with patient that she may be a candidate for total knee replacement as long as her BMI remains under 40.  A referral was placed for the patient to see Dr. Muñoz to discuss total knee replacement.  Patient was provided a refill of Voltaren gel and a prescription for lidocaine patch.  Discussed with the patient that she should avoid taking multiple NSAID medications simultaneously to avoid GI and kidney issues.  Patient will follow-up with an as-needed basis. The patient demonstrated understanding of the discussion and was in agreement with the plan.  All of the questions were answered.  Patient can reach out to clinic with any questions or concerns at any time.    Follow up: as needed  Imaging: none      Chief Complaint   Patient presents with    Left Knee - Pain    Right Knee - Pain       History of Present Illness:  The patient is a 49 y.o. female seen in clinic for bilateral knee pain (R>L). Patient states the most recent cortisone injection provided about 2 weeks of relief. She states she has been taking meloxicam and aleve with no relief. She has been working on decreasing her weight to be eligible for a total knee replacement.       Prior history:  49 y.o.female following up for evaluation of bilateral knee pain,  right greater than left.  Patient was previously seen in clinic for bilateral knee pain and was diagnosed with osteoarthritis which is notably worse in the right knee.  She was provided with a prescription for meloxicam as well as physical therapy.  Patient states that she continues to have significant pain in both knees.  She also endorses swelling as well as popping and clicking.  Patient does have an elevated A1c above 8.  We previously talked about how that would limit her ability to get a corticosteroid injection or have a total knee replacement.  The patient has recently switched her medications and is working on improved glucose control.     The patient has the following co-morbidities: T2DM, obesity      Knee Surgical History:  None    Past Medical, Social and Family History:  Past Medical History:   Diagnosis Date    Allergic     Asthma     Diabetes mellitus (HCC)     Hypertension     Migraines      Past Surgical History:   Procedure Laterality Date    HERNIA REPAIR       Allergies   Allergen Reactions    Aspirin Rash     Blisters in mouth    Cephalexin Rash     Current Outpatient Medications on File Prior to Visit   Medication Sig Dispense Refill    albuterol (2.5 mg/3 mL) 0.083 % nebulizer solution Take 3 mL (2.5 mg total) by nebulization every 4 (four) hours as needed for wheezing 75 mL 1    albuterol (PROVENTIL HFA,VENTOLIN HFA) 90 mcg/act inhaler Inhale 2 puffs every 6 (six) hours as needed for wheezing 8.5 g 1    Alcaftadine (Lastacaft) 0.25 % SOLN Administer 1 drop to both eyes daily as needed (allergic eye symptoms) 3 mL 1    amitriptyline (ELAVIL) 10 mg tablet Take 2 tablets (20 mg total) by mouth daily at bedtime 180 tablet 1    azelastine (OPTIVAR) 0.05 % ophthalmic solution INSTILL 1 DROP INTO BOTH EYES TWICE A DAY 18 mL 1    Blood Glucose Monitoring Suppl (OneTouch Verio Reflect) w/Device KIT Check blood sugars once daily. Please substitute with appropriate alternative as covered by patient's  insurance. Dx: E11.65 1 kit 0    busPIRone (BUSPAR) 5 mg tablet TAKE 1 TABLET BY MOUTH TWICE A  tablet 1    cetirizine (ZyrTEC) 10 mg tablet Take 1 tablet (10 mg total) by mouth daily 90 tablet 1    Cholecalciferol (Vitamin D3) 25 MCG TABS TAKE 1 TABLET BY MOUTH EVERY DAY 90 tablet 1    fluticasone (FLONASE) 50 mcg/act nasal spray SPRAY 2 SPRAYS INTO EACH NOSTRIL EVERY DAY 48 mL 1    Fluticasone-Salmeterol (Wixela Inhub) 250-50 mcg/dose inhaler Inhale 1 puff 2 (two) times a day Rinse mouth after use. 60 blister 5    glucose blood (OneTouch Verio) test strip Check blood sugars once daily. Please substitute with appropriate alternative as covered by patient's insurance. Dx: E11.65 100 each 3    losartan (COZAAR) 25 mg tablet TAKE 1 TABLET (25 MG TOTAL) BY MOUTH DAILY. 90 tablet 1    meloxicam (MOBIC) 15 mg tablet TAKE 1 TABLET (15 MG TOTAL) BY MOUTH DAILY. 30 tablet 5    metFORMIN (GLUCOPHAGE-XR) 500 mg 24 hr tablet TAKE 2 TABLETS BY MOUTH DAILY WITH DINNER 180 tablet 1    metoprolol succinate (TOPROL-XL) 50 mg 24 hr tablet Take 1 tablet (50 mg total) by mouth daily 90 tablet 1    montelukast (SINGULAIR) 10 mg tablet Take 1 tablet (10 mg total) by mouth daily at bedtime 90 tablet 1    nystatin (MYCOSTATIN) powder Apply topically 3 (three) times a day 45 g 1    omeprazole (PriLOSEC) 40 MG capsule Take 1 capsule (40 mg total) by mouth daily 90 capsule 1    OneTouch Delica Lancets 33G MISC Check blood sugars once daily. Please substitute with appropriate alternative as covered by patient's insurance. Dx: E11.65 100 each 3    rosuvastatin (CRESTOR) 10 MG tablet TAKE 1 TABLET BY MOUTH EVERY DAY 90 tablet 1    Tirzepatide (Mounjaro) 5 MG/0.5ML SOAJ Inject 5 mg under the skin once a week 2 mL 3    topiramate (TOPAMAX) 25 mg tablet Take 3 tablets (75 mg total) by mouth daily at bedtime 270 tablet 1    ZOLMitriptan (Zomig) 5 MG tablet Take 1 tablet at onset of headache. Max 2 tablets per week. 5 tablet 1     No current  facility-administered medications on file prior to visit.     Social History     Socioeconomic History    Marital status: /Civil Union     Spouse name: Not on file    Number of children: Not on file    Years of education: Not on file    Highest education level: Not on file   Occupational History    Not on file   Tobacco Use    Smoking status: Every Day     Current packs/day: 1.00     Types: Cigarettes    Smokeless tobacco: Current   Vaping Use    Vaping status: Never Used   Substance and Sexual Activity    Alcohol use: Never    Drug use: Never    Sexual activity: Not on file   Other Topics Concern    Not on file   Social History Narrative    Not on file     Social Drivers of Health     Financial Resource Strain: Not on file   Food Insecurity: No Food Insecurity (2024)    Nursing - Inadequate Food Risk Classification     Worried About Running Out of Food in the Last Year: Not on file     Ran Out of Food in the Last Year: Not on file     Ran Out of Food in the Last Year: Never true   Transportation Needs: No Transportation Needs (2024)    Nursing - Transportation Risk Classification     Lack of Transportation: Not on file     Lack of Transportation: No   Physical Activity: Not on file   Stress: Not on file   Social Connections: Not on file   Intimate Partner Violence: Unknown (2024)    Nursing IPS     Feels Physically and Emotionally Safe: Not on file     Physically Hurt by Someone: Not on file     Humiliated or Emotionally Abused by Someone: Not on file     Physically Hurt by Someone: No     Hurt or Threatened by Someone: No   Housing Stability: Unknown (2024)    Nursing: Inadequate Housing Risk Classification     Has Housing: Not on file     Worried About Losing Housing: Not on file     Unable to Get Utilities: Not on file     Unable to Pay for Housing in the Last Year: No     Has Housin         I have reviewed the past medical, surgical, social and family history, medications and  allergies as documented in the EMR.      Physical Exam:    Focused right knee exam:  Ambulates with a antalgic gait.    Inspection:  - Skin intact  - Ecchymosis: no  - Erythema: no  - Gross deformity: no  - Previous surgical incisions: no  - Effusion: mild    Palpation:  - Medial patellar facet: no  - Lateral patellar facet: no  - Tibial tubercle: no  - Patella tendon: no  - Pes anserine bursa: no  - Gerdy's tubercle: no  - Popliteal fossa: no    - Lateral epicondyle: no  - Medial epicondyle: no  - Posterior calf: no    Range of motion:  - Extension: 2 degrees  - Flexion: 110 degrees  - Pain with hyperflexion: no  - Pain with hyperextension: no    Strength:  - Patient able to perform a straight leg raise  - Hip flexion: 5/5  - Knee extension: 5/5  - Knee flexion: 5/5  - Ankle DF: 5/5  - Ankle PF: 5/5    Meniscus:  - Medial joint line tenderness: no  - Lateral joint line tenderness: no  - Ying's: no  - Flexion compression: no  - Thessaly test: n/a    Collateral ligaments:  -  Varus laxity at full extension: no  -  Varus laxity at full in 30° of knee flexion: no  -  Valgus laxity at full extension: no  -  Valgus laxity at full in 30° of knee flexion: no    Cruciate ligaments:  - Lachman: 1A  - Pivot shift test: no  - Anterior drawer: 1A  - Posterior drawer: 1A  - Posterior tibial sag: no    Patella:  - Apprehension with lateral patellar translation: no  - Able to sublux 2 quadrant with firm endpoint  - Apprehension with medial patellar translation: no  - Able to sublux 2 quadrant with firm endpoint  - J sign: no  - Patella grind test: no  - Patella crepitus: yes  - Patella tilt: no  - Squat test: n/a    Range of motion testing of the hip and ankle are within normal limits.    No calf tenderness to palpation bilaterally. Negative Alma test    LE NV Exam:   +2 DP/PT pulses bilaterally  Sensation intact to light touch L2-S1 bilaterally, SPN/DPN/tibial/saphenous/sural nerve distributions  Motor intact in SPN/DPN/TA  nerve distributions      Knee Imaging    X-rays of the bilateral knee were obtained on 10/12/2024 and reviewed with the patient. Per my independent review, the imaging shows severe degenerative changes in the medial compartment of the right knee and mild to moderate degenerative changes in the left knee.      Procedures:  None      This note was written with the use of dictation software. Please excuse any errors.      Scribe Attestation      I,:  Todd Fox PA-C am acting as a scribe while in the presence of the attending physician.:       I,:  Keith Diaz MD personally performed the services described in this documentation    as scribed in my presence.:

## 2025-04-15 ENCOUNTER — OFFICE VISIT (OUTPATIENT)
Dept: OBGYN CLINIC | Facility: CLINIC | Age: 50
End: 2025-04-15
Payer: COMMERCIAL

## 2025-04-15 ENCOUNTER — APPOINTMENT (OUTPATIENT)
Dept: RADIOLOGY | Facility: CLINIC | Age: 50
End: 2025-04-15
Attending: ORTHOPAEDIC SURGERY
Payer: COMMERCIAL

## 2025-04-15 VITALS — WEIGHT: 192 LBS | HEIGHT: 59 IN | BODY MASS INDEX: 38.71 KG/M2

## 2025-04-15 DIAGNOSIS — M17.11 PRIMARY OSTEOARTHRITIS OF RIGHT KNEE: Primary | ICD-10-CM

## 2025-04-15 DIAGNOSIS — Z01.812 PRE-OPERATIVE LABORATORY EXAMINATION: ICD-10-CM

## 2025-04-15 DIAGNOSIS — M17.0 PRIMARY OSTEOARTHRITIS OF BOTH KNEES: ICD-10-CM

## 2025-04-15 PROCEDURE — 73562 X-RAY EXAM OF KNEE 3: CPT

## 2025-04-15 PROCEDURE — 99213 OFFICE O/P EST LOW 20 MIN: CPT | Performed by: ORTHOPAEDIC SURGERY

## 2025-04-15 RX ORDER — TRANEXAMIC ACID 10 MG/ML
1000 INJECTION, SOLUTION INTRAVENOUS ONCE
OUTPATIENT
Start: 2025-04-15 | End: 2025-04-15

## 2025-04-15 RX ORDER — ASCORBIC ACID 500 MG
500 TABLET ORAL 2 TIMES DAILY
Qty: 60 TABLET | Refills: 1 | Status: SHIPPED | OUTPATIENT
Start: 2025-04-15 | End: 2025-06-14

## 2025-04-15 RX ORDER — CHLORHEXIDINE GLUCONATE 40 MG/ML
SOLUTION TOPICAL DAILY PRN
OUTPATIENT
Start: 2025-04-15

## 2025-04-15 RX ORDER — FOLIC ACID 1 MG/1
1 TABLET ORAL DAILY
Qty: 30 TABLET | Refills: 1 | Status: SHIPPED | OUTPATIENT
Start: 2025-04-15 | End: 2025-06-14

## 2025-04-15 RX ORDER — MULTIVIT-MIN/IRON FUM/FOLIC AC 7.5 MG-4
1 TABLET ORAL DAILY
Qty: 30 TABLET | Refills: 1 | Status: SHIPPED | OUTPATIENT
Start: 2025-04-15

## 2025-04-15 RX ORDER — CLINDAMYCIN PHOSPHATE 900 MG/50ML
900 INJECTION, SOLUTION INTRAVENOUS ONCE
OUTPATIENT
Start: 2025-04-15 | End: 2025-04-15

## 2025-04-15 RX ORDER — CHLORHEXIDINE GLUCONATE ORAL RINSE 1.2 MG/ML
15 SOLUTION DENTAL ONCE
OUTPATIENT
Start: 2025-04-15 | End: 2025-04-15

## 2025-04-15 RX ORDER — MUPIROCIN 20 MG/G
OINTMENT TOPICAL
Qty: 15 G | Refills: 1 | Status: SHIPPED | OUTPATIENT
Start: 2025-04-15

## 2025-04-15 RX ORDER — FERROUS SULFATE 324(65)MG
324 TABLET, DELAYED RELEASE (ENTERIC COATED) ORAL
Qty: 60 TABLET | Refills: 1 | Status: SHIPPED | OUTPATIENT
Start: 2025-04-15 | End: 2025-06-14

## 2025-04-15 NOTE — PROGRESS NOTES
Assessment & Plan  Primary osteoarthritis of both knees    Orders:    Ambulatory Referral to Orthopedic Surgery    XR knee 3 vw right non injury; Future    Primary osteoarthritis of right knee  X-rays of the patient's right knee are consistent with advanced arthritic changes with no medial joint space remaining.  The patient states that her symptoms are continuing to worsen starting to affect her quality of life.  After exhausting conservative treatment, including Toradol injection and physical therapy, the risks and benefits of surgery were discussed with the patient.  She decided on and elective right total knee replacement.  The patient surgery is tentatively scheduled for July 21, 2025.  Orders:    [facility] to [facility]; Future    Ambulatory referral to Physical Therapy; Future    Case request operating room: ARTHROPLASTY KNEE TOTAL; Standing    mupirocin (BACTROBAN) 2 % ointment; Apply topically to the inside of the left and right nostrils twice daily for 5 days before surgery, including the morning of surgery.    ascorbic acid (VITAMIN C) 500 MG tablet; Take 1 tablet (500 mg total) by mouth 2 (two) times a day    folic acid (FOLVITE) 1 mg tablet; Take 1 tablet (1 mg total) by mouth daily    ferrous sulfate 324 (65 Fe) mg; Take 1 tablet (324 mg total) by mouth 2 (two) times a day before meals    Multiple Vitamins-Minerals (multivitamin with minerals) tablet; Take 1 tablet by mouth daily    Pre-operative laboratory examination    Orders:    Comprehensive metabolic panel; Future    Hemoglobin A1C W/EAG Estimation; Future    CBC and differential; Future    MRSA culture; Future    Anemia Panel w/Reflex; Future    Protime-INR; Future    APTT; Future          The patient has advanced arthritic changes of her right knee.  Despite activity modification, anti-inflammatory medications, injections, weight loss, she still has persistent pain in her knee.  New x-rays show no medial joint  space remaining.  The patient is opted for elective right total knee replacement.  All risk, complications, benefits were discussed with the patient in great detail including bleeding, infection, blood clots, pain, stiffness, neurovascular damage, fractures, dislocations, the possibility loss elective surgery, heart attack, stroke, wound problems, etc.  Her surgery scheduled for July 21, 2025    Return for surgery.      _____________________________________________________  CHIEF COMPLAINT:  Chief Complaint   Patient presents with    Right Knee - Pain         SUBJECTIVE:  Iris Curtis is a 49 y.o. female who presents to our office complaining of bilateral knee pain, worse along her right knee.  Her pain is worsened with prolonged activity and ambulation.  She states she has been able to reduce both her weight and A1c.  She has tried a Toradol injection and physical therapy, however, she still having significant pain.  She denies any numbness or tingling.  She denies any fever or chills.     The following portions of the patient's history were reviewed and updated as appropriate: allergies, current medications, past family history, past medical history, past social history, past surgical history and problem list.    PAST MEDICAL HISTORY:  Past Medical History:   Diagnosis Date    Allergic     Asthma     Diabetes mellitus (HCC)     Hypertension     Migraines        PAST SURGICAL HISTORY:  Past Surgical History:   Procedure Laterality Date    HERNIA REPAIR         FAMILY HISTORY:  Family History   Problem Relation Age of Onset    COPD Mother     Diabetes Mother     COPD Father     No Known Problems Sister     No Known Problems Maternal Grandmother     No Known Problems Paternal Grandmother     No Known Problems Maternal Aunt     No Known Problems Maternal Aunt     No Known Problems Paternal Aunt        SOCIAL HISTORY:  Social History     Tobacco Use    Smoking status: Every Day     Current packs/day: 1.00      Types: Cigarettes    Smokeless tobacco: Current   Vaping Use    Vaping status: Never Used   Substance Use Topics    Alcohol use: Never    Drug use: Never       MEDICATIONS:    Current Outpatient Medications:     albuterol (2.5 mg/3 mL) 0.083 % nebulizer solution, Take 3 mL (2.5 mg total) by nebulization every 4 (four) hours as needed for wheezing, Disp: 75 mL, Rfl: 1    albuterol (PROVENTIL HFA,VENTOLIN HFA) 90 mcg/act inhaler, Inhale 2 puffs every 6 (six) hours as needed for wheezing, Disp: 8.5 g, Rfl: 1    Alcaftadine (Lastacaft) 0.25 % SOLN, Administer 1 drop to both eyes daily as needed (allergic eye symptoms), Disp: 3 mL, Rfl: 1    amitriptyline (ELAVIL) 10 mg tablet, Take 2 tablets (20 mg total) by mouth daily at bedtime, Disp: 180 tablet, Rfl: 1    ascorbic acid (VITAMIN C) 500 MG tablet, Take 1 tablet (500 mg total) by mouth 2 (two) times a day, Disp: 60 tablet, Rfl: 1    azelastine (OPTIVAR) 0.05 % ophthalmic solution, INSTILL 1 DROP INTO BOTH EYES TWICE A DAY, Disp: 18 mL, Rfl: 1    Blood Glucose Monitoring Suppl (OneTouch Verio Reflect) w/Device KIT, Check blood sugars once daily. Please substitute with appropriate alternative as covered by patient's insurance. Dx: E11.65, Disp: 1 kit, Rfl: 0    busPIRone (BUSPAR) 5 mg tablet, TAKE 1 TABLET BY MOUTH TWICE A DAY, Disp: 180 tablet, Rfl: 1    cetirizine (ZyrTEC) 10 mg tablet, Take 1 tablet (10 mg total) by mouth daily, Disp: 90 tablet, Rfl: 1    Cholecalciferol (Vitamin D3) 25 MCG TABS, TAKE 1 TABLET BY MOUTH EVERY DAY, Disp: 90 tablet, Rfl: 1    Diclofenac Sodium (VOLTAREN) 1 %, Apply 2 g topically 4 (four) times a day, Disp: 2 g, Rfl: 0    ferrous sulfate 324 (65 Fe) mg, Take 1 tablet (324 mg total) by mouth 2 (two) times a day before meals, Disp: 60 tablet, Rfl: 1    fluticasone (FLONASE) 50 mcg/act nasal spray, SPRAY 2 SPRAYS INTO EACH NOSTRIL EVERY DAY, Disp: 48 mL, Rfl: 1    Fluticasone-Salmeterol (Wixela Inhub) 250-50 mcg/dose inhaler, Inhale 1 puff 2  (two) times a day Rinse mouth after use., Disp: 60 blister, Rfl: 5    folic acid (FOLVITE) 1 mg tablet, Take 1 tablet (1 mg total) by mouth daily, Disp: 30 tablet, Rfl: 1    glucose blood (OneTouch Verio) test strip, Check blood sugars once daily. Please substitute with appropriate alternative as covered by patient's insurance. Dx: E11.65, Disp: 100 each, Rfl: 3    lidocaine (Lidoderm) 5 %, Apply 1 patch topically over 12 hours daily Remove & Discard patch within 12 hours or as directed by MD, Disp: 14 patch, Rfl: 0    losartan (COZAAR) 25 mg tablet, TAKE 1 TABLET (25 MG TOTAL) BY MOUTH DAILY., Disp: 90 tablet, Rfl: 1    meloxicam (MOBIC) 15 mg tablet, TAKE 1 TABLET (15 MG TOTAL) BY MOUTH DAILY., Disp: 30 tablet, Rfl: 5    metFORMIN (GLUCOPHAGE-XR) 500 mg 24 hr tablet, TAKE 2 TABLETS BY MOUTH DAILY WITH DINNER, Disp: 180 tablet, Rfl: 1    metoprolol succinate (TOPROL-XL) 50 mg 24 hr tablet, Take 1 tablet (50 mg total) by mouth daily, Disp: 90 tablet, Rfl: 1    montelukast (SINGULAIR) 10 mg tablet, Take 1 tablet (10 mg total) by mouth daily at bedtime, Disp: 90 tablet, Rfl: 1    Multiple Vitamins-Minerals (multivitamin with minerals) tablet, Take 1 tablet by mouth daily, Disp: 30 tablet, Rfl: 1    mupirocin (BACTROBAN) 2 % ointment, Apply topically to the inside of the left and right nostrils twice daily for 5 days before surgery, including the morning of surgery., Disp: 15 g, Rfl: 1    nystatin (MYCOSTATIN) powder, Apply topically 3 (three) times a day, Disp: 45 g, Rfl: 1    omeprazole (PriLOSEC) 40 MG capsule, Take 1 capsule (40 mg total) by mouth daily, Disp: 90 capsule, Rfl: 1    OneTouch Delica Lancets 33G MISC, Check blood sugars once daily. Please substitute with appropriate alternative as covered by patient's insurance. Dx: E11.65, Disp: 100 each, Rfl: 3    rosuvastatin (CRESTOR) 10 MG tablet, TAKE 1 TABLET BY MOUTH EVERY DAY, Disp: 90 tablet, Rfl: 1    Tirzepatide (Mounjaro) 5 MG/0.5ML SOAJ, Inject 5 mg  "under the skin once a week, Disp: 2 mL, Rfl: 3    topiramate (TOPAMAX) 25 mg tablet, Take 3 tablets (75 mg total) by mouth daily at bedtime, Disp: 270 tablet, Rfl: 1    ZOLMitriptan (Zomig) 5 MG tablet, Take 1 tablet at onset of headache. Max 2 tablets per week., Disp: 5 tablet, Rfl: 1    ALLERGIES:  Allergies   Allergen Reactions    Aspirin Rash     Blisters in mouth    Cephalexin Rash       ROS:  Review of Systems     Constitutional: Negative for fatigue, fever or loss of appetite.   HENT: Negative.    Respiratory: Negative for shortness of breath, dyspnea.    Cardiovascular: Negative for chest pain/tightness.   Gastrointestinal: Negative for abdominal pain, N/V.   Endocrine: Negative for cold/heat intolerance, unexplained weight loss/gain.   Genitourinary: Negative for flank pain, dysuria, hematuria.   Musculoskeletal: Positive for arthralgia   Skin: Negative for rash.    Neurological: Negative for numbness or tingling  Psychiatric/Behavioral: Negative for agitation.  _____________________________________________________  PHYSICAL EXAMINATION:    Height 4' 11\" (1.499 m), weight 87.1 kg (192 lb), last menstrual period 03/29/2025.    Constitutional: Oriented to person, place, and time. Appears well-developed and well-nourished. No distress.   HENT:   Head: Normocephalic.   Eyes: Conjunctivae are normal. Right eye exhibits no discharge. Left eye exhibits no discharge. No scleral icterus.   Cardiovascular: Normal rate.    Pulmonary/Chest: Effort normal.   Neurological: Alert and oriented to person, place, and time.   Skin: Skin is warm and dry. No rash noted. Not diaphoretic. No erythema. No pallor.   Psychiatric: Normal mood and affect. Behavior is normal. Judgment and thought content normal.      MUSCULOSKELETAL EXAMINATION:   Physical Exam  Ortho Exam    Right lower extremity is neurovascularly intact  Toes are pink and mobile   Compartments are soft  No warmth, erythema or ecchymosis  ROM of knee is from 5-115 " "degrees  Negative Lachman, drawer or pivot shift  No medial instability  Medial joint line tenderness, slight lateral joint line tenderness  Patellofemoral crepitation  Cardiovascular: Normal rate    Pulmonary: Effort normal  Abdomen: Soft, nontender, nondistended   Objective:  BP Readings from Last 1 Encounters:   03/31/25 130/84      Wt Readings from Last 1 Encounters:   04/15/25 87.1 kg (192 lb)        BMI:   Estimated body mass index is 38.78 kg/m² as calculated from the following:    Height as of this encounter: 4' 11\" (1.499 m).    Weight as of this encounter: 87.1 kg (192 lb).      Scribe Attestation      I,:  Eliseo Madrid PA-C am acting as a scribe while in the presence of the attending physician.:       I,:  Sami Muñoz DO personally performed the services described in this documentation    as scribed in my presence.:            "

## 2025-06-04 DIAGNOSIS — G43.909 MIGRAINE WITHOUT STATUS MIGRAINOSUS, NOT INTRACTABLE, UNSPECIFIED MIGRAINE TYPE: ICD-10-CM

## 2025-06-04 RX ORDER — TOPIRAMATE 25 MG/1
75 TABLET, FILM COATED ORAL
Qty: 270 TABLET | Refills: 1 | Status: SHIPPED | OUTPATIENT
Start: 2025-06-04 | End: 2025-10-02

## 2025-06-06 ENCOUNTER — PREP FOR PROCEDURE (OUTPATIENT)
Dept: OBGYN CLINIC | Facility: CLINIC | Age: 50
End: 2025-06-06

## 2025-06-07 DIAGNOSIS — G43.909 MIGRAINE WITHOUT STATUS MIGRAINOSUS, NOT INTRACTABLE, UNSPECIFIED MIGRAINE TYPE: ICD-10-CM

## 2025-06-08 RX ORDER — ZOLMITRIPTAN 5 MG/1
TABLET, FILM COATED ORAL
Qty: 5 TABLET | Refills: 1 | Status: SHIPPED | OUTPATIENT
Start: 2025-06-08

## 2025-06-17 DIAGNOSIS — M17.0 PRIMARY OSTEOARTHRITIS OF BOTH KNEES: ICD-10-CM

## 2025-06-17 DIAGNOSIS — Z12.31 ENCOUNTER FOR SCREENING MAMMOGRAM FOR BREAST CANCER: ICD-10-CM

## 2025-06-17 RX ORDER — MELOXICAM 15 MG/1
15 TABLET ORAL DAILY
Qty: 30 TABLET | Refills: 5 | Status: SHIPPED | OUTPATIENT
Start: 2025-06-17 | End: 2025-06-24 | Stop reason: SDUPTHER

## 2025-06-24 DIAGNOSIS — E55.9 VITAMIN D DEFICIENCY: ICD-10-CM

## 2025-06-24 DIAGNOSIS — G43.909 MIGRAINE WITHOUT STATUS MIGRAINOSUS, NOT INTRACTABLE, UNSPECIFIED MIGRAINE TYPE: ICD-10-CM

## 2025-06-24 DIAGNOSIS — J30.2 SEASONAL ALLERGIES: ICD-10-CM

## 2025-06-24 DIAGNOSIS — M17.0 PRIMARY OSTEOARTHRITIS OF BOTH KNEES: ICD-10-CM

## 2025-06-24 DIAGNOSIS — J45.40 MODERATE PERSISTENT ASTHMA WITHOUT COMPLICATION: Chronic | ICD-10-CM

## 2025-06-24 DIAGNOSIS — M17.11 PRIMARY OSTEOARTHRITIS OF RIGHT KNEE: ICD-10-CM

## 2025-06-25 ENCOUNTER — TELEPHONE (OUTPATIENT)
Dept: OBGYN CLINIC | Facility: HOSPITAL | Age: 50
End: 2025-06-25

## 2025-06-25 RX ORDER — MONTELUKAST SODIUM 10 MG/1
10 TABLET ORAL
Qty: 90 TABLET | Refills: 1 | Status: SHIPPED | OUTPATIENT
Start: 2025-06-25

## 2025-06-25 RX ORDER — MELOXICAM 15 MG/1
15 TABLET ORAL DAILY
Qty: 30 TABLET | Refills: 5 | Status: SHIPPED | OUTPATIENT
Start: 2025-06-25

## 2025-06-25 RX ORDER — FLUTICASONE PROPIONATE AND SALMETEROL 250; 50 UG/1; UG/1
1 POWDER RESPIRATORY (INHALATION) 2 TIMES DAILY
Qty: 60 BLISTER | Refills: 5 | Status: SHIPPED | OUTPATIENT
Start: 2025-06-25

## 2025-06-25 RX ORDER — CHOLECALCIFEROL (VITAMIN D3) 25 MCG
1 TABLET ORAL DAILY
Qty: 90 TABLET | Refills: 1 | Status: SHIPPED | OUTPATIENT
Start: 2025-06-25

## 2025-06-25 RX ORDER — CETIRIZINE HYDROCHLORIDE 10 MG/1
10 TABLET ORAL DAILY
Qty: 90 TABLET | Refills: 1 | Status: SHIPPED | OUTPATIENT
Start: 2025-06-25

## 2025-06-25 RX ORDER — AMITRIPTYLINE HYDROCHLORIDE 10 MG/1
20 TABLET ORAL
Qty: 180 TABLET | Refills: 1 | Status: SHIPPED | OUTPATIENT
Start: 2025-06-25

## 2025-06-25 RX ORDER — ASCORBIC ACID 500 MG
500 TABLET ORAL 2 TIMES DAILY
Qty: 60 TABLET | Refills: 0 | Status: SHIPPED | OUTPATIENT
Start: 2025-06-25 | End: 2025-08-24

## 2025-06-25 RX ORDER — FERROUS SULFATE 324(65)MG
324 TABLET, DELAYED RELEASE (ENTERIC COATED) ORAL
Qty: 60 TABLET | Refills: 0 | Status: SHIPPED | OUTPATIENT
Start: 2025-06-25 | End: 2025-08-24

## 2025-06-25 RX ORDER — MULTIVIT-MIN/IRON FUM/FOLIC AC 7.5 MG-4
1 TABLET ORAL DAILY
Qty: 30 TABLET | Refills: 0 | Status: SHIPPED | OUTPATIENT
Start: 2025-06-25

## 2025-06-25 NOTE — TELEPHONE ENCOUNTER
Preoperative Elective Admission Assessment- spoke with patient     EKG/LAB/MRSA SWAB/CXR date: going 06/30    Living Situation:    Who does pt live with: spouse- taking off for 2 weeks, DIL's, sons   What kind of home: multi-level  How do they enter the home: front  How many levels in home: 2 + basement   # of steps to enter home: 1, has ramp  # of steps to second floor: 14  Are there handrails: Yes- broken, son is working to fix it  Are there landings: Yes  Sleeping arrangement: first/entry floor  Where is Bathroom: entry level   Where is the tub or shower: walk in shower   Toilet height? Concerns for low toilets -has standard- no concern, pt states due to stature, can't use RTS   Dogs or ther pets: no     First Floor Setup:   Is there a bathroom: Yes  Where would pt sleep: bed     DME: ordering RW; advised to bring dos   We discussed clearing pathways in the home and making sure there is accessibly to use the walker, for example, removing throw rugs.      Patient's Current Level of Function: Ambulates: Independently    Post-op Caregiver: spouse and family   Caregiver Name and phone number for Inpatient discharge needs: Solo (spouse) [phone]  Currently receive any HHC/aides/community supports: No     Post-op Transport: spouse  To/from hospital: spouse  To/from PT 2-3x/week: spouse  Uses community transport now: No     Outpatient Physical Therapy Site:  Site: [place]   pre and post-op appts scheduled? Yes     Medication Management: self  Preferred Pharm[facility] for Post-op Medications[id number] [id number]]   Blood Management Vitamin Regimen: Pt confirms taking as prescribed  Post-op anticoagulant: to be determined by surgical team postoperatively  Has Bactroban for 5 days preop: Yes  Educated on Preoperative Bathing Instructions, and use of Soap for 5 days before surgery.      DC Plan: Pt plans to be discharged home      Barriers to DC identified preoperatively: none  identified    BMI: 38.78    Patient Education:  Pt educated on post-op pain, early mobilization (POD0), LOS goals, OP PT goals, and preoperative bathing. Patient educated that our goal is to appropriately discharge patient based off their post-op function while striving to maintain maximal independence. The goal is to discharge patient to home and for them to attend outpatient physical therapy.    Assigned to care team? Yes    SW referral: no

## 2025-06-26 NOTE — PRE-PROCEDURE INSTRUCTIONS
Pre-Surgery Instructions:   Medication Instructions    albuterol (2.5 mg/3 mL) 0.083 % nebulizer solution Uses PRN- OK to take day of surgery    albuterol (PROVENTIL HFA,VENTOLIN HFA) 90 mcg/act inhaler Uses PRN- OK to take day of surgery    amitriptyline (ELAVIL) 10 mg tablet PT takes all oral meds at bedtime ok to take night before sx.    ascorbic acid (VITAMIN C) 500 MG tablet PT takes all oral meds at bedtime ok to take night before sx.    busPIRone (BUSPAR) 5 mg tablet PT takes all oral meds at bedtime ok to take night before sx.    cetirizine (ZyrTEC) 10 mg tablet PT takes all oral meds at bedtime ok to take night before sx.    Cholecalciferol (Vitamin D3) 25 MCG TABS PT takes all oral meds at bedtime ok to take night before sx.    Diclofenac Sodium (VOLTAREN) 1 % Stop taking 3 days prior to surgery.    ferrous sulfate 324 (65 Fe) mg PT takes all oral meds at bedtime ok to take night before sx.    fluticasone (FLONASE) 50 mcg/act nasal spray  PT takes all oral meds at bedtime ok to take night before sx.    Fluticasone-Salmeterol (Wixela Inhub) 250-50 mcg/dose inhaler Uses PRN- OK to take day of surgery    folic acid (FOLVITE) 1 mg tablet PT takes all oral meds at bedtime ok to take night before sx.    lidocaine (Lidoderm) 5 % Hold day of surgery.    losartan (COZAAR) 25 mg tablet PT takes all oral meds at bedtime ok to take night before sx.    meloxicam (MOBIC) 15 mg tablet Stop taking 7 days prior to surgery.    metFORMIN (GLUCOPHAGE-XR) 500 mg 24 hr tablet PT takes all oral meds at bedtime ok to take night before sx.    metoprolol succinate (TOPROL-XL) 50 mg 24 hr tablet PT takes all oral meds at bedtime ok to take night before sx.    montelukast (SINGULAIR) 10 mg tablet PT takes all oral meds at bedtime ok to take night before sx.    Multiple Vitamins-Minerals (multivitamin with minerals) tablet PT takes all oral meds at bedtime ok to take night before sx.    nystatin (MYCOSTATIN) powder Hold day of  surgery.    omeprazole (PriLOSEC) 40 MG capsule PT takes all oral meds at bedtime ok to take night before sx.    rosuvastatin (CRESTOR) 10 MG tablet PT takes all oral meds at bedtime ok to take night before sx.    Tirzepatide (Mounjaro) 5 MG/0.5ML SOAJ Instructions provided by MD Last dose 7/13/25    topiramate (TOPAMAX) 25 mg tablet PT takes all oral meds at bedtime ok to take night before sx.   Medication instructions for day of surgery reviewed. Please take all instructed medications with only a sip of water. Please do not take any over the counter (non-prescribed) vitamins or supplements for one week prior to date of surgery.      You will receive a call one business day prior to surgery with an arrival time and hospital directions. If your surgery is scheduled on a Monday, the hospital will be calling you on the Friday prior to your surgery. If you have not heard from anyone by 8pm, please call the hospital supervisor through the hospital  at [phone]. ([place] [phone] or [place] [phone]).    Do not eat or drink anything after midnight the night before your surgery, including candy, mints, lifesavers, or chewing gum. Do not drink alcohol 24hrs before your surgery. Try not to smoke at least 24hrs before your surgery.       Follow the pre surgery showering instructions as listed in the “My Surgical Experience Booklet” or otherwise provided by your surgeon's office. Do not use a blade to shave the surgical area 1 week before surgery. It is okay to use a clean electric clippers up to 24 hours before surgery. Do not apply any lotions, creams, including makeup, cologne, deodorant, or perfumes after showering on the day of your surgery. Do not use dry shampoo, hair spray, hair gel, or any type of hair products.     No contact lenses, eye make-up, or artificial eyelashes. Remove nail polish, including gel polish, and any artificial, gel, or acrylic nails if possible. Remove all jewelry including  rings and body piercing jewelry.     Wear causal clothing that is easy to take on and off. Consider your type of surgery.    Keep any valuables, jewelry, piercings at home. Please bring any specially ordered equipment (sling, braces) if indicated.    Arrange for a responsible person to drive you to and from the hospital on the day of your surgery. Please confirm the visitor policy for the day of your procedure when you receive your phone call with an arrival time.     Call the surgeon's office with any new illnesses, exposures, or additional questions prior to surgery.    Please reference your “My Surgical Experience Booklet” for additional information to prepare for your upcoming surgery.    Reviewed Ortho packet

## 2025-06-27 LAB
DME PARACHUTE DELIVERY DATE ACTUAL: NORMAL
DME PARACHUTE DELIVERY DATE EXPECTED: NORMAL
DME PARACHUTE DELIVERY DATE REQUESTED: NORMAL
DME PARACHUTE ITEM DESCRIPTION: NORMAL
DME PARACHUTE ORDER STATUS: NORMAL
DME PARACHUTE SUPPLIER NAME: NORMAL
DME PARACHUTE SUPPLIER PHONE: NORMAL

## 2025-06-30 ENCOUNTER — APPOINTMENT (OUTPATIENT)
Dept: LAB | Facility: CLINIC | Age: 50
End: 2025-06-30
Payer: COMMERCIAL

## 2025-06-30 DIAGNOSIS — Z01.812 PRE-OPERATIVE LABORATORY EXAMINATION: ICD-10-CM

## 2025-06-30 DIAGNOSIS — E11.9 TYPE 2 DIABETES MELLITUS WITHOUT COMPLICATION, WITHOUT LONG-TERM CURRENT USE OF INSULIN (HCC): ICD-10-CM

## 2025-06-30 LAB
ALBUMIN SERPL BCG-MCNC: 4 G/DL (ref 3.5–5)
ALP SERPL-CCNC: 83 U/L (ref 34–104)
ALT SERPL W P-5'-P-CCNC: 13 U/L (ref 7–52)
ANION GAP SERPL CALCULATED.3IONS-SCNC: 10 MMOL/L (ref 4–13)
APTT PPP: 34 SECONDS (ref 23–34)
AST SERPL W P-5'-P-CCNC: 15 U/L (ref 13–39)
BASOPHILS # BLD AUTO: 0.07 THOUSANDS/ÂΜL (ref 0–0.1)
BASOPHILS NFR BLD AUTO: 1 % (ref 0–1)
BILIRUB SERPL-MCNC: 0.22 MG/DL (ref 0.2–1)
BUN SERPL-MCNC: 7 MG/DL (ref 5–25)
CALCIUM SERPL-MCNC: 8.9 MG/DL (ref 8.4–10.2)
CHLORIDE SERPL-SCNC: 108 MMOL/L (ref 96–108)
CHOLEST SERPL-MCNC: 132 MG/DL (ref ?–200)
CO2 SERPL-SCNC: 21 MMOL/L (ref 21–32)
CREAT SERPL-MCNC: 0.58 MG/DL (ref 0.6–1.3)
EOSINOPHIL # BLD AUTO: 0.32 THOUSAND/ÂΜL (ref 0–0.61)
EOSINOPHIL NFR BLD AUTO: 3 % (ref 0–6)
ERYTHROCYTE [DISTWIDTH] IN BLOOD BY AUTOMATED COUNT: 16.7 % (ref 11.6–15.1)
EST. AVERAGE GLUCOSE BLD GHB EST-MCNC: 134 MG/DL
GFR SERPL CREATININE-BSD FRML MDRD: 109 ML/MIN/1.73SQ M
GLUCOSE P FAST SERPL-MCNC: 89 MG/DL (ref 65–99)
HBA1C MFR BLD: 6.3 %
HCT VFR BLD AUTO: 43.8 % (ref 34.8–46.1)
HDLC SERPL-MCNC: 39 MG/DL
HGB BLD-MCNC: 13.3 G/DL (ref 11.5–15.4)
IMM GRANULOCYTES # BLD AUTO: 0.05 THOUSAND/UL (ref 0–0.2)
IMM GRANULOCYTES NFR BLD AUTO: 0 % (ref 0–2)
INR PPP: 0.93 (ref 0.85–1.19)
LDLC SERPL CALC-MCNC: 57 MG/DL (ref 0–100)
LYMPHOCYTES # BLD AUTO: 2.32 THOUSANDS/ÂΜL (ref 0.6–4.47)
LYMPHOCYTES NFR BLD AUTO: 19 % (ref 14–44)
MCH RBC QN AUTO: 24.5 PG (ref 26.8–34.3)
MCHC RBC AUTO-ENTMCNC: 30.4 G/DL (ref 31.4–37.4)
MCV RBC AUTO: 81 FL (ref 82–98)
MONOCYTES # BLD AUTO: 0.54 THOUSAND/ÂΜL (ref 0.17–1.22)
MONOCYTES NFR BLD AUTO: 5 % (ref 4–12)
NEUTROPHILS # BLD AUTO: 8.77 THOUSANDS/ÂΜL (ref 1.85–7.62)
NEUTS SEG NFR BLD AUTO: 72 % (ref 43–75)
NRBC BLD AUTO-RTO: 0 /100 WBCS
PLATELET # BLD AUTO: 358 THOUSANDS/UL (ref 149–390)
PMV BLD AUTO: 9.9 FL (ref 8.9–12.7)
POTASSIUM SERPL-SCNC: 3.8 MMOL/L (ref 3.5–5.3)
PROT SERPL-MCNC: 6.9 G/DL (ref 6.4–8.4)
PROTHROMBIN TIME: 12.8 SECONDS (ref 12.3–15)
RBC # BLD AUTO: 5.43 MILLION/UL (ref 3.81–5.12)
SODIUM SERPL-SCNC: 139 MMOL/L (ref 135–147)
TRIGL SERPL-MCNC: 180 MG/DL (ref ?–150)
WBC # BLD AUTO: 12.07 THOUSAND/UL (ref 4.31–10.16)

## 2025-06-30 PROCEDURE — 87081 CULTURE SCREEN ONLY: CPT

## 2025-06-30 PROCEDURE — 85610 PROTHROMBIN TIME: CPT

## 2025-06-30 PROCEDURE — 85025 COMPLETE CBC W/AUTO DIFF WBC: CPT

## 2025-06-30 PROCEDURE — 85730 THROMBOPLASTIN TIME PARTIAL: CPT

## 2025-06-30 PROCEDURE — 80061 LIPID PANEL: CPT

## 2025-06-30 PROCEDURE — 83036 HEMOGLOBIN GLYCOSYLATED A1C: CPT

## 2025-06-30 PROCEDURE — 80053 COMPREHEN METABOLIC PANEL: CPT

## 2025-06-30 PROCEDURE — 36415 COLL VENOUS BLD VENIPUNCTURE: CPT

## 2025-07-01 LAB — MRSA NOSE QL CULT: NORMAL

## 2025-07-07 ENCOUNTER — EVALUATION (OUTPATIENT)
Dept: PHYSICAL THERAPY | Facility: CLINIC | Age: 50
End: 2025-07-07
Attending: PHYSICIAN ASSISTANT
Payer: COMMERCIAL

## 2025-07-07 VITALS — HEART RATE: 90 BPM | DIASTOLIC BLOOD PRESSURE: 89 MMHG | SYSTOLIC BLOOD PRESSURE: 144 MMHG

## 2025-07-07 DIAGNOSIS — M17.11 OSTEOARTHRITIS OF RIGHT KNEE, UNSPECIFIED OSTEOARTHRITIS TYPE: ICD-10-CM

## 2025-07-07 DIAGNOSIS — M17.11 PRIMARY OSTEOARTHRITIS OF RIGHT KNEE: Primary | ICD-10-CM

## 2025-07-07 PROCEDURE — 97110 THERAPEUTIC EXERCISES: CPT

## 2025-07-07 PROCEDURE — 97162 PT EVAL MOD COMPLEX 30 MIN: CPT

## 2025-07-07 NOTE — PROGRESS NOTES
PT Evaluation     Today's date: 2025  Patient name: Iris Curtis  : 1975  MRN: [id number]  Referring provider: Eliseo Madrid,*  Dx:   Encounter Diagnosis     ICD-10-CM    1. Primary osteoarthritis of right knee  M17.11 Ambulatory referral to Physical Therapy      2. Osteoarthritis of right knee, unspecified osteoarthritis type  M17.11           Start Time: 1120  Stop Time: 1215  Total time in clinic (min): 55 minutes    Assessment    Assessment details: Problem List:  1) R knee hypomobility- addressed with manual therapy, neuro-re-education and therapeutic exercises  2) R knee movement coordination deficits- addressed with neuro-re-education, therapeutic exercises/activities  3) R knee strength deficits- addressed with neuro-re-education, therapeutic exercises/activities  4) Activity Intolerance- addressed with neuro-re-education, therapeutic exercises/activities  5) Gait Dysfunction- addressed with neuro-re-education, therapeutic exercises/activities    Iris Curtis is a pleasant 50 y.o. female who presents with chronic R knee pain for pre-operative evaluation for elective R TKA performed by Dr. Sami Muñoz on 25.  she has R knee hypomobility and R knee strength deficits, nociplastic pain, and symptoms consistent with R knee OA resulting in difficulty standing, walking, and climbing stairs. No further referral appears necessary at this time based upon examination results. I expect she will improve with progressive AROM and strengthening both pre-op and post-op. Positive prognostic indicators include pleasant and positive attitude toward recovery. Negative prognostic indicators include anxiety.  DOS discussed with patient, questions were answered to patient's satisfaction. Home Prep Checklist reviewed including identification of care partner and encouragement of single-level set up. Post-operative pain management expectations discussed, post-operative gait training for level ground,  stairs, and car transfers was performed. Patient demonstrated competence with immediate post-operative home exercise program.    Comparable signs:  1) Stairs  2) Walking  Understanding of Dx/Px/POC: good     Prognosis: good  Prognosis details: Patient is pleasant and motivated to improve function. Demonstrates verbal understanding of POC and able to perform HEP.     Goals  Before Operation:     1 STG: Patient to review and understand home checklist to prepare for post-operative living situation. MET     2 STG: Patient to demonstrate independence with pre-op HEP to help manage symptoms before operation and immediately post-op. MET        Plan  Patient would benefit from: skilled physical therapy  Planned modality interventions: neuromuscular electric stimulation, cryotherapy, thermotherapy: hydrocollator packs and unattended electrical stimulation    Planned therapy interventions: manual therapy, gait training, therapeutic exercise, therapeutic activities and neuromuscular re-education    Frequency: 1-2x week  Duration in weeks: 4  Plan of Care beginning date: 7/7/2025  Plan of Care expiration date: 8/4/2025  Treatment plan discussed with: patient  Plan details: Discussed POC and HEP with patient, patient agreeable to both. Reassess post-operatively.        Subjective Evaluation    History of Present Illness  Mechanism of injury: Iris Curtis is a 50 y.o. female who presents to OPPT for pre-operative initial evaluation with c/o chronic R knee pain. Is having difficulty with prolonged standing and walking, having difficulty doing all ADL's indepedently. Patient reports knee gives out on her approximately 4 times a week. She states her R leg starts to shake when standing/walking and she is afraid of falling. Patient has an appointment scheduled for elective R TKA on 7/21/25 performed by Dr. Muñoz. 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  Goals: Decrease pain. Be able to independently perform all ADL's.  Patient  Goals  Patient goals for therapy: decreased pain, improved balance, increased motion, increased strength and independence with ADLs/IADLs    Pain  Current pain ratin  At best pain ratin  At worst pain rating: 10  Quality: dull ache  Relieving factors: ice, rest and relaxation  Aggravating factors: stair climbing, walking and standing          Objective     Active Range of Motion   Left Knee   Flexion: 123 degrees with pain  Extension: -8 degrees with pain    Right Knee   Flexion: 90 degrees with pain  Extension: 5 degrees with pain    Additional Active Range of Motion Details  Squat: L lat trunk lean    Strength/Myotome Testing     Left Hip   Planes of Motion   Flexion: 4  Abduction: 4  Adduction: 4+  External rotation: 4  Internal rotation: 4    Right Hip   Planes of Motion   Flexion: 3+  Abduction: 4  Adduction: 4+  External rotation: 4  Internal rotation: 4    Left Knee   Flexion: 4  Extension: 4    Right Knee   Flexion: 4  Extension: 4    Left Ankle/Foot   Dorsiflexion: 4+  Plantar flexion: 4+    Right Ankle/Foot   Dorsiflexion: 4  Plantar flexion: 4+    Tests     Additional Tests Details  :30 STS: 13 times 0HHA  TU seconds    Ambulation     Ambulation: Stairs   Pattern: non-reciprocal  Railings: one rail  Pattern: non-reciprocal  Railings: one rail    Additional Stairs Ambulation Details  During descent goes sideways due to poor eccentric control    Observational Gait   Gait: antalgic              Precautions: DOS 25 R TKA  POC: 25  Manuals        L knee mobs        PROM                          Neuro Re-Ed        Quad set :05x10       SLR        TKE        airex        LAQ x10       SAQ         ankle pumps x10       Ther Ex        Nu-step (cardio endurance)        Heel slides x10       Calf stretch        HR/TR        STS        Leg press S=8        Lunge stretch                 Ther Activity        Step ups/down        Side stepping        HT walk                                      Gait Training                                         Modalities

## 2025-07-11 ENCOUNTER — TELEMEDICINE (OUTPATIENT)
Age: 50
End: 2025-07-11
Payer: COMMERCIAL

## 2025-07-11 DIAGNOSIS — E78.5 HYPERLIPIDEMIA, UNSPECIFIED HYPERLIPIDEMIA TYPE: Chronic | ICD-10-CM

## 2025-07-11 DIAGNOSIS — E11.9 TYPE 2 DIABETES MELLITUS WITHOUT COMPLICATION, WITHOUT LONG-TERM CURRENT USE OF INSULIN (HCC): Primary | ICD-10-CM

## 2025-07-11 DIAGNOSIS — M17.11 PRIMARY OSTEOARTHRITIS OF RIGHT KNEE: ICD-10-CM

## 2025-07-11 DIAGNOSIS — K21.9 GASTROESOPHAGEAL REFLUX DISEASE, UNSPECIFIED WHETHER ESOPHAGITIS PRESENT: ICD-10-CM

## 2025-07-11 PROCEDURE — 99215 OFFICE O/P EST HI 40 MIN: CPT | Performed by: INTERNAL MEDICINE

## 2025-07-11 NOTE — ASSESSMENT & PLAN NOTE
Lab Results   Component Value Date    HGBA1C 6.3 (H) 06/30/2025   Hold ACEI/ARB/diuretics the morning of surgery to decrease risk of post operative KELLIE; post op add hydralazine prn for elevated blood pressures. May resume these meds upon discharge  CCD

## 2025-07-11 NOTE — ASSESSMENT & PLAN NOTE
Failed outpatient conservative measures  Elected to undergo surgical intervention  Hold current GLP-1 ( Wegovy, Zepbound) 1 week prior to surgery  Has in person face to face visit with PCP scheduled before surgery

## 2025-07-11 NOTE — PROGRESS NOTES
The Patient is located at Home and in the following state in which I hold an active license [place]    The patient was identified by name and date of birth Iris Curtis was informed that this is a telemedicine visit and that the visit is being conducted through the Epic Embedded platform. She agrees to proceed..  My office door was closed and no one else was in the room.  Patient acknowledged consent and understanding of privacy and security of the video platform. The patient has agreed to participate and understands they can discontinue the visit at any time.    Patient is aware this is a billable service.        Internal Medicine Pre-Operative Evaluation:     Reason for Visit: Pre-operative Evaluation for Risk Stratification and Optimization    Patient ID: Iris Curtis is a 50 y.o. female.     Future cases for Iris Curtis [[id number]]       Case ID Status Date Time Marcelino Procedure Provider Location    [id number] [facility] 7/21/2025  7:30  RIGHT TOTAL KNEE ARTHROPLASTY Sami Muñoz DO [[id number]] [place] [facility]               Recommendations to Proceed withSurgery    Patient is considered to be Low risk for Medium risk procedure.     After evaluation and discussion with patient with emphasis that all surgery has some degree of inherent risk it is acknowledged by patient this risk is Acceptable.    Patient may proceed with planned procedure.     Assessment    Pre-operative Medical Evaluation for planned surgery  Recommendations as listed in PLAN section below  Contact surgical nurse  navigator with any questions regarding preoperative plan or schedule.      Assessment & Plan  Primary osteoarthritis of right knee  Failed outpatient conservative measures  Elected to undergo surgical intervention  Hold current GLP-1 ( Wegovy, Zepbound) 1 week prior to surgery  Has in person face to face visit with PCP scheduled before surgery      Type 2 diabetes mellitus without complication, without long-term current use of insulin  c/o L sided chest pain radiating down L arm, on and off since yesterday- getting progressively worse today ([facility])    Lab Results   Component Value Date    HGBA1C 6.3 (H) 06/30/2025   Hold ACEI/ARB/diuretics the morning of surgery to decrease risk of post operative KELLIE; post op add hydralazine prn for elevated blood pressures. May resume these meds upon discharge  CCD  Hyperlipidemia, unspecified hyperlipidemia type  Continue low cholesterol diet and statin therapy    Gastroesophageal reflux disease, unspecified whether esophagitis present  Continue PPI             Plan:     1. Further preoperative workup as follows:   - none no further testing required may proceed with surgery    2. Preoperative Medication Management Review performed by PAT nursing  YES    3. Patient requires further consultation with:   No Consults Required    4. Discharge Planning / Barriers to Discharge  none identified - patients has post discharge therapy plan in place, transportation arranged for discharge day, adequate family support at home to assist with discharge to home.        Subjective:           History of Present Illness:     Iris Curtis is a 50 y.o. female who presents to the office today for a preoperative consultation at the request of surgeon. The patient understands this is an elective procedure and not emergent. They are electing to undergo planned procedure with an understanding that all surgery has inherent risk. They have worked with their surgeon and failed conservative treatment measures. Today they present for preoperative risk assessment and recommendations for optimization in preparation for surgery.    Pt seen in center for perioperative medicine for upcoming proposed surgery. They have failed previous conservative measures and have elected surgical intervention.     Pt meets presurgical lab and BMI optimization goals.      Iris Curtis has an IN HOSPITAL cardiac risk of RCI RISK CLASS I (0 risk factors, risk of major cardiac compl. appr. 0.5%) based on RCRI calculator    Cardiac Risk Estimation: per the Revised  Cardiac Risk Index (Circ. 100:1043, 1999),         Pre-op Exam    Previous history of bleeding disorders or clots?: No  Previous Anesthesia reaction?: No  Prolonged steroid use in the last 6 months?: No    Assessment of Cardiac Risk:   - Unstable or severe angina or MI in the last 6 weeks or history of stent placement in the last year?: No   - Decompensated heart failure (e.g. New onset heart failure, NYHA  Class IV heart failure, or worsening existing heart failure)?: No  - Significant arrhythmias such as high grade AV block, symptomatic ventricular arrhythmia, newly recognized ventricular tachycardia, supraventricular tachycardia with resting heart rate >100, or symptomatic bradycardia?: No  - Severe heart valve disease including aortic stenosis or symptomatic mitral stenosis?: No      Pre-operative Risk Factors:  Elevated-risk surgery: No    History of cerebrovascular disease: No    History of ischemic heart disease: No  Pre-operative treatment with insulin: No  Pre-operative creatinine >2 mg/dL: No    History of congestive heart failure: No    Duke Activity Status Index (DASI):   DASI Total Score: 23.45  METs: 5.6    Medications of Perioperative Concern:   ACE inhibitors/ARBs and GLP agonists        ROS: No TIA's or unusual headaches, no dysphagia.  No prolonged cough. No dyspnea or chest pain on exertion.  No abdominal pain, change in bowel habits, black or bloody stools.  No urinary tract or BPH symptoms.  Positive reported pain in arthritic joint. Positive difficulty with gait. No skin rashes or issues.          The following portions of the patient's history were reviewed and updated as appropriate: allergies, current medications, past family history, past medical history, past social history, past surgical history and problem list.     Past History:       Past Medical History[1] Past Surgical History[2]       Social History[3]  Family History[4]       Allergies:     Allergies[5]     Current Medications:      Current Outpatient Medications   Medication Instructions    albuterol (PROVENTIL HFA,VENTOLIN HFA) 90 mcg/act inhaler 2 puffs, Inhalation, Every 6 hours PRN    albuterol 2.5 mg, Nebulization, Every 4 hours PRN    Alcaftadine (Lastacaft) 0.25 % SOLN 1 drop, Both Eyes, Daily PRN    amitriptyline (ELAVIL) 20 mg, Oral, Daily at bedtime    ascorbic acid (VITAMIN C) 500 mg, Oral, 2 times daily    azelastine (OPTIVAR) 0.05 % ophthalmic solution INSTILL 1 DROP INTO BOTH EYES TWICE A DAY    Blood Glucose Monitoring Suppl (OneTouch Verio Reflect) w/Device KIT Check blood sugars once daily. Please substitute with appropriate alternative as covered by patient's insurance. Dx: E11.65    busPIRone (BUSPAR) 5 mg, Oral, 2 times daily    cetirizine (ZYRTEC) 10 mg, Oral, Daily    Diclofenac Sodium (VOLTAREN) 2 g, Topical, 4 times daily    ferrous sulfate 324 mg, Oral, 2 times daily before meals    fluticasone (FLONASE) 50 mcg/act nasal spray SPRAY 2 SPRAYS INTO EACH NOSTRIL EVERY DAY    Fluticasone-Salmeterol (Wixela Inhub) 250-50 mcg/dose inhaler 1 puff, Inhalation, 2 times daily, Rinse mouth after use.    folic acid (FOLVITE) 1 mg, Oral, Daily    glucose blood (OneTouch Verio) test strip Check blood sugars once daily. Please substitute with appropriate alternative as covered by patient's insurance. Dx: E11.65    lidocaine (Lidoderm) 5 % 1 patch, Topical, Daily, Remove & Discard patch within 12 hours or as directed by MD    losartan (COZAAR) 25 mg, Oral, Daily    meloxicam (MOBIC) 15 mg, Oral, Daily    metFORMIN (GLUCOPHAGE-XR) 500 mg 24 hr tablet TAKE 2 TABLETS BY MOUTH DAILY WITH DINNER    metoprolol succinate (TOPROL-XL) 50 mg, Oral, Daily    montelukast (SINGULAIR) 10 mg, Oral, Daily at bedtime,       Mounjaro 5 mg, Subcutaneous, Weekly    Multiple Vitamins-Minerals (multivitamin with minerals) tablet 1 tablet, Oral, Daily    mupirocin (BACTROBAN) 2 % ointment Apply topically to the inside of the left and right nostrils  "twice daily for 5 days before surgery, including the morning of surgery.    nystatin (MYCOSTATIN) powder Topical, 3 times daily    omeprazole (PRILOSEC) 40 mg, Oral, Daily    OneTouch Delica Lancets 33G MISC Check blood sugars once daily. Please substitute with appropriate alternative as covered by patient's insurance. Dx: E11.65    rosuvastatin (CRESTOR) 10 mg, Oral, Daily    topiramate (TOPAMAX) 75 mg, Oral, Daily at bedtime    Vitamin D3 25 mcg, Oral, Daily    ZOLMitriptan (ZOMIG) 5 MG tablet TAKE 1 TABLET AT ONSET OF HEADACHE. MAX 2 TABLETS PER WEEK.           PRE-OP WORKSHEET DATA    Assessment of Pre-Operative Risks     MLJ Quality Hard Stops:    BMI (<40) : Estimated body mass index is 37.57 kg/m² as calculated from the following:    Height as of 6/26/25: 4' 11\" (1.499 m).    Weight as of 6/26/25: 84.4 kg (186 lb).    Hgb ( >11):   Lab Results   Component Value Date    HGB 13.3 06/30/2025    HGB 12.2 01/03/2025    HGB 9.8 (L) 12/19/2024       HbA1c (<7.5) :   Lab Results   Component Value Date    HGBA1C 6.3 (H) 06/30/2025       GFR (>60) (Less then 45 = Nephrology consult):    Lab Results   Component Value Date    EGFR 109 06/30/2025    EGFR 112 12/27/2024    EGFR 108 12/19/2024            Pre-Op Data Reviewed:       Laboratory Results: I have personally reviewed the pertinent reports    EKG: I personally reviewed and interpreted available tracings in the electronic medical record    No results found for this or any previous visit (from the past 4464 hours).    OLD RECORDS: reviewed old records in the chart review section if EHR on day of visit.    Previous cardiopulmonary studies within the past year:  Echocardiogram:  Lab Results   Component Value Date    LVEF 60 02/21/2024       Previous STRESS TEST:  No results found for this or any previous visit.     No results found for this or any previous visit.    No results found for this or any previous visit.      Previous Cath/PCI:  No results found for this or " any previous visit.    No results found for this or any previous visit.    No results found for this or any previous visit.      ECHO:  No results found for this or any previous visit.    Results for orders placed during the hospital encounter of 02/21/24    Echo complete w/ contrast if indicated    Interpretation Summary    Left Ventricle: Left ventricular cavity size is normal. Wall thickness is normal. The left ventricular ejection fraction is 60%. Systolic function is normal. Wall motion is normal. Diastolic function is normal.          Time of visit including pre-visit chart review, visit and post-visit coordination of plan and care , review of pre-surgical lab work, preparation and time spent documenting note in electronic medical record, time spent face-to-face in physical examination answering patient questions by care team was a minimum of  45 minutes             [facility]         [1]   Past Medical History:  Diagnosis Date    Allergic     Asthma     Diabetes mellitus (HCC)     GERD (gastroesophageal reflux disease)     Hypertension     Migraines     Pneumonia    [2]   Past Surgical History:  Procedure Laterality Date    HERNIA REPAIR      Umbilical as child   [3]   Social History  Tobacco Use    Smoking status: Every Day     Current packs/day: 1.00     Types: Cigarettes    Smokeless tobacco: Current   Vaping Use    Vaping status: Never Used   Substance Use Topics    Alcohol use: Never    Drug use: Never   [4]   Family History  Problem Relation Name Age of Onset    COPD Mother      Diabetes Mother      COPD Father      No Known Problems Sister      No Known Problems Maternal Grandmother      No Known Problems Paternal Grandmother      No Known Problems Maternal Aunt      No Known Problems Maternal Aunt      No Known Problems Paternal Aunt     [5]   Allergies  Allergen Reactions    Aspirin Rash     Blisters in mouth    Cephalexin Rash

## 2025-07-11 NOTE — PATIENT INSTRUCTIONS
BEFORE SURGERY    Contact your surgical nurse navigator or surgical provider with any questions regarding preoperative plan or schedule.  Stop all over the counter supplements, herbal, naturopathic  medications for 1 week prior to surgery UNLESS prescribed by your surgeon  Hold NSAIDS (i.e. advil, alleve, motrin, ibuprofen, celebrex) minimum 5 days prior to surgery  Follow presurgical medication instructions provided by preadmission nursing team reviewed during your presurgery phone call  Strategies for optimizing your surgery through breathing exercises, nutrition and physical activity can be found at [email or web address]/best  Call [phone] with any presurgical concerns or medications questions or use the messaging feature in your [employer] otilia to contact your provider    AFTER SURGERY    Recommend using Tylenol ( acetaminophen ) 1000 mg every eight hours during the first week post discharge along with icing the area for 20 mins every 3-4 hours while awake can be helpful in reducing your need for post operative opioid use. This opioid sparing plan can be used along side your surgeons pain plan.  Use stool softener over the counter (colace) daily after surgery during the first 1-2 weeks to avoid post operative constipation issues  If no bowel movement within 3 days after surgery then use over the counter Miralax in addition to your stool softener   If cleared by your surgical team for activity then early and often walking is encouraged and can be important in prevention of post surgical blood clots. Additionally spend as much time out of bed as possible and allowed by your surgical team  Use your incentive spirometer twice per hour in the first seven days after surgery to help prevent post surgery lung complications and infections  It is very important you follow the instructions from your surgeon regarding any medications for after surgery blood clot prevention. Compliance with these medications or interventions is very  important.  Call [phone] with any post discharge concerns or medical issues or use the messaging feature in your [employer] otilia to contact your provider

## 2025-07-14 PROCEDURE — NC001 PR NO CHARGE: Performed by: ORTHOPAEDIC SURGERY

## 2025-07-14 NOTE — H&P
H&P - Orthopedics   Name: Iris Curtis 50 y.o. female I MRN: [id number]  Unit/Bed#:  I Date of Admission: (Not on file)   Date of Service: 7/14/2025 I Hospital Day: 0     Assessment & Plan  Primary osteoarthritis of right knee  Elective right total knee replacement    History of Present Illness   HPI: Iris Curtis is a 50 y.o. year old female who presented to our office complaining of right knee pain with ambulatory dysfunction.  The patient stated that her symptoms were continue to worsen starting to affect her quality of life.  X-rays of the patient's right knee were performed which were consistent with advanced arthritic changes.  After exhausting conservative treatment, the risks and benefits of surgery were discussed with the patient.  She decided on an elective right total knee replacement.    Review of Systems   Constitutional:  Negative for chills, fever and unexpected weight change.   HENT:  Negative for nosebleeds and sore throat.    Eyes:  Negative for pain, redness and visual disturbance.   Respiratory:  Negative for cough, shortness of breath and wheezing.    Cardiovascular:  Negative for chest pain, palpitations and leg swelling.   Gastrointestinal:  Negative for abdominal pain, nausea and vomiting.   Endocrine: Negative for polydipsia and polyuria.   Genitourinary:  Negative for dysuria and hematuria.   Musculoskeletal:  Positive for arthralgias, gait problem and myalgias.        As noted in HPI   Skin:  Negative for rash and wound.   Neurological:  Negative for dizziness, numbness and headaches.   Psychiatric/Behavioral:  Negative for decreased concentration and suicidal ideas. The patient is not nervous/anxious.     significant for findings described in the HPI.  Historical Information   Past Medical History[1]  Past Surgical History[2]  Social History[3]  E-Cigarette/Vaping    E-Cigarette Use Never User      E-Cigarette/Vaping Substances    Nicotine No     THC No     CBD No     Flavoring  "No     Other No     Unknown No      Family history non-contributory    Objective :     Physical ExamOrtho Exam   Right lower extremity is neurovascularly intact  Toes are pink and mobile   Compartments are soft  No warmth, erythema or ecchymosis  ROM of knee is from 5-115 degrees  Negative Lachman, drawer or pivot shift  No medial instability  Medial joint line tenderness, slight lateral joint line tenderness  Patellofemoral crepitation   Cardiovascular: Normal rate    Pulmonary: Effort normal  Abdomen: Soft, nontender, nondistended      Lab Results: I have reviewed the following results:   No results for input(s): \"WBC\", \"HGB\", \"HCT\", \"PLT\", \"BANDSPCT\", \"BUN\", \"CREATININE\", \"PTT\", \"INR\", \"ESR\", \"CRP\" in the last 72 hours.  Blood Culture:   Lab Results   Component Value Date    BLOODCX No Growth After 5 Days. 12/16/2024    BLOODCX No Growth After 5 Days. 12/16/2024     Wound Culture: No results found for: \"WOUNDCULT\"    Imaging Results Review: I personally reviewed the following image studies in PACS and associated radiology reports: X-ray right knee. My interpretation of the radiology images/reports is: Advanced arthritic changes.  Other Study Results Review: No additional pertinent studies reviewed.       [1]   Past Medical History:  Diagnosis Date    Allergic     Asthma     Diabetes mellitus (HCC)     GERD (gastroesophageal reflux disease)     Hypertension     Migraines     Pneumonia    [2]   Past Surgical History:  Procedure Laterality Date    HERNIA REPAIR      Umbilical as child   [3]   Social History  Tobacco Use    Smoking status: Every Day     Current packs/day: 1.00     Types: Cigarettes    Smokeless tobacco: Current   Vaping Use    Vaping status: Never Used   Substance and Sexual Activity    Alcohol use: Never    Drug use: Never     "

## 2025-07-17 ENCOUNTER — OFFICE VISIT (OUTPATIENT)
Dept: FAMILY MEDICINE CLINIC | Facility: CLINIC | Age: 50
End: 2025-07-17
Payer: COMMERCIAL

## 2025-07-17 VITALS
RESPIRATION RATE: 17 BRPM | OXYGEN SATURATION: 96 % | SYSTOLIC BLOOD PRESSURE: 124 MMHG | DIASTOLIC BLOOD PRESSURE: 78 MMHG | BODY MASS INDEX: 37.7 KG/M2 | HEIGHT: 59 IN | TEMPERATURE: 97.1 F | HEART RATE: 84 BPM | WEIGHT: 187 LBS

## 2025-07-17 DIAGNOSIS — Z01.818 PRE-OP EXAMINATION: Primary | ICD-10-CM

## 2025-07-17 DIAGNOSIS — E78.5 HYPERLIPIDEMIA, UNSPECIFIED HYPERLIPIDEMIA TYPE: Chronic | ICD-10-CM

## 2025-07-17 DIAGNOSIS — E11.9 TYPE 2 DIABETES MELLITUS WITHOUT COMPLICATION, WITHOUT LONG-TERM CURRENT USE OF INSULIN (HCC): ICD-10-CM

## 2025-07-17 DIAGNOSIS — F17.200 CURRENT SMOKER: ICD-10-CM

## 2025-07-17 DIAGNOSIS — J45.40 MODERATE PERSISTENT ASTHMA WITHOUT COMPLICATION: Chronic | ICD-10-CM

## 2025-07-17 DIAGNOSIS — E66.9 OBESITY (BMI 30-39.9): ICD-10-CM

## 2025-07-17 DIAGNOSIS — G43.909 MIGRAINE WITHOUT STATUS MIGRAINOSUS, NOT INTRACTABLE, UNSPECIFIED MIGRAINE TYPE: Chronic | ICD-10-CM

## 2025-07-17 DIAGNOSIS — K21.9 GASTROESOPHAGEAL REFLUX DISEASE, UNSPECIFIED WHETHER ESOPHAGITIS PRESENT: ICD-10-CM

## 2025-07-17 DIAGNOSIS — M17.11 PRIMARY OSTEOARTHRITIS OF RIGHT KNEE: ICD-10-CM

## 2025-07-17 DIAGNOSIS — F41.9 ANXIETY: ICD-10-CM

## 2025-07-17 PROBLEM — R06.89 ACUTE RESPIRATORY INSUFFICIENCY: Status: RESOLVED | Noted: 2024-12-17 | Resolved: 2025-07-17

## 2025-07-17 PROCEDURE — 99244 OFF/OP CNSLTJ NEW/EST MOD 40: CPT | Performed by: NURSE PRACTITIONER

## 2025-07-17 PROCEDURE — 93000 ELECTROCARDIOGRAM COMPLETE: CPT | Performed by: NURSE PRACTITIONER

## 2025-07-17 NOTE — ASSESSMENT & PLAN NOTE
Continue with statin  LDL at goal  Lab Results   Component Value Date    LDLCALC 57 06/30/2025

## 2025-07-17 NOTE — PROGRESS NOTES
Pre-operative Clearance  Name: Iris Curtis      : 1975      MRN: [id number]  Encounter Provider: MARGUERITE Patel  Encounter Date: 2025   Encounter department: [facility]IMARY [facility]ARE    :  Assessment & Plan  Pre-op examination    Orders:  •  POCT ECG    Primary osteoarthritis of right knee         Moderate persistent asthma without complication  Using wixela- but only once per day. Increase to twice per day.   Use albuterol PRN- current use 7-8x week        Migraine without status migrainosus, not intractable, unspecified migraine type  Well controlled with topamax and zomig       Gastroesophageal reflux disease, unspecified whether esophagitis present    Continue with PPI       Type 2 diabetes mellitus without complication, without long-term current use of insulin ([facility])    Lab Results   Component Value Date    HGBA1C 6.3 (H) 2025   Well controlled.   Medication hold information previously provided to patient from specialist        Anxiety  Continue with current medications           Obesity (BMI 30-39.9)  Wt Readings from Last 3 Encounters:   25 84.8 kg (187 lb)   04/15/25 87.1 kg (192 lb)   25 87.1 kg (192 lb)     Improving with GLP       Hyperlipidemia, unspecified hyperlipidemia type   Continue with statin  LDL at goal  Lab Results   Component Value Date    LDLCALC 57 2025              Current smoker  Strongly encouraged importance of cessation prior to surgery         Assessment & Plan        Pre-operative Clearance:     Revised Cardiac Risk Index:  RCI RISK CLASS I (0 risk factors, risk of major cardiac complications approximately 0.5%)    Clearance:  Patient is medically optimized (CLEARED) for proposed surgery without any additional cardiac testing.       Strongly advised smoking cessation     Medication Instructions:   - Avoid herbs or non-directed vitamins one week prior to surgery    - ACE Inhibitors or ARBs: Continue this medication up to  the evening before surgery/procedure, but do not take the morning of the day of surgery.  - Antidepressants: Continue to take this medication on your normal schedule.  - Antiepileptic meds: Continue to take this medication on your normal schedule.  - Beta blockers:  Continue to take this medication on your normal schedule.  - Buspirone: Continue to take this medication on your normal schedule.  - Hyperlipidemia meds: Continue to take this medication on your normal schedule.  - Leukotriene Inhibitor (ie, montelukast, zafirlucast): Continue to take this medication on your normal schedule.      Medicine Instructions for Adults with Diabetes (NO Bowel Prep)    Follow these instructions when a BOWEL PREP is NOT required for your procedure or surgery!    NOTE:  GLP Agonists taken weekly: do not take in the 7 days before your procedure. **Bariatric surgery: do not take 4 weeks prior to your procedure.    SGLT-2 Inhibitors: do not take in the 4 days before your procedure    On the Day Before Surgery/Procedure  If you are having a procedure (e.g., Colonoscopy) or surgery which DOES NOT require a bowel prep, follow the directions below based on the type of medicine you take for your diabetes.  Type of Medicine You Take Examples What to Do   Pre-Mixed Insulin Intermediate  Wwovaeh43/25, Eykirok75/30, Novolog 70/30, Regular Insulin Take 1/2 your regular dose the evening before our procedure.   Rapid/Fast Acting  Insulin and/or Long-Acting Insulin Humalog U200, NovoLog, Apidra,  Lantus, Levemir, Tresiba, Toujeo,  Fias, Basaglar Take your FULL regular dose the day before procedure.   Oral Diabetic Medicines (sulfonylurea) Glipizide/Glimepiride/  Glucotrol Take your regular dose with dinner the evening before your procedure.   Other Oral Diabetic Medicines Metformin, Glucophage, Glucophage  XR, Riomet, Glumetza, Actose,  Avandia, Gl set, Prandin Take your regular dose with dinner the evening before your procedure   GLP Agonists  Adlyxin, Byetta, Bydureon,  Ozempic, Soliqua, Tanzeum,  Trulicity, Victoza, Saxenda,  Rybelsus, Wegovy, Mounjaro, Zepbound If taken daily, take as normal  If taken weekly, do not take this medicine for 7 days before your procedure including the day of the procedure (resume taking after the procedure). **Bariatric surgery: do not take 4 weeks prior to procedure   SGLT-2 Inhibitors Jardiance, Invokana, Farxiga, Steglatro, Brenzavvy, Qtern, Segluromet Glyxambi, Synjardy, Synjardy XR, Invokamet, InvokametXR, Trijary XR, Xigduo X Do not take for 4 days before your procedure including the day of the procedure (resume taking after the procedure)   This educational material has been approved by the Patient Education Advisory Committee.    On the Day of Surgery/Procedure  Follow the directions below based on the type of medicine you take for your diabetes.  Type of Medicine You Take  Examples What to Do   Long-Acting Insulin Lantus, Levemir, Tresiba,  Toujeo, Basaglar, Semglee If you normally take your Long Acting Insulin in the morning, take the full dose as scheduled.   GLP-I Agonists Adlyxin, Byetta, Bydureon,  Ozempic, Soliqua, Tanzeum,  Trulicity, Victoza, Saxenda,  Rybelsus, Mounjaro Do NOT take this medicine on the day of your procedure (resume taking after the procedure)   Except for the morning Long-Acting Insulin, DO NOT take ANY diabetic medicine on the day of your procedure unless you were instructed by the doctor who manages your diabetes medicines.  Continue to check your blood sugars.  If you have an insulin pump, ask your endocrinologist for instructions at least 3 days before your procedure. NOTE: If you are not able to ask your endocrinologist in advance, on the day of the procedure set your insulin pump to your basal rate only. Bring your insulin pump supplies to the hospital.         History of Present Illness     Pre-Op Examination     Surgery: RIGHT TOTAL KNEE ARTHROPLASTY (Right: Knee)   Anticipated  Date of Surgery: 7/21/25   Surgeon: Dr Muñoz     Previous history of bleeding disorders or clots?: No    Previous Anesthesia reaction?: No    Prolonged steroid use in the last 6 months?: No      Assessment of Cardiac Risk:   - Unstable or severe angina or MI in the last 6 weeks or history of stent placement in the last year?: No    - Decompensated heart failure (e.g. New onset heart failure, NYHA  Class IV heart failure, or worsening existing heart failure)?: No    - Significant arrhythmias such as high grade AV block, symptomatic ventricular arrhythmia, newly recognized ventricular tachycardia, supraventricular tachycardia with resting heart rate >100, or symptomatic bradycardia?: No    - Severe heart valve disease including aortic stenosis or symptomatic mitral stenosis?: No      Pre-operative Risk Factors:  - Elevated-risk surgery: No    - History of cerebrovascular disease: No    - History of ischemic heart disease: No    - History of congestive heart failure: No    - Pre-operative treatment with insulin: No    - Pre-operative creatinine >2 mg/dL: No      [facility] Activity Status Index (DASI):    - DASI Total Score: 18.95   - METs: 5.1     Medications of Perioperative Concern:    NSAIDs, Beta blockers, ACE inhibitors/ARBs, Metformin and GLP agonists    Review of Systems   Constitutional:  Negative for chills and fever.   Eyes:  Negative for discharge.   Respiratory:  Negative for shortness of breath.    Cardiovascular:  Negative for chest pain.   Gastrointestinal:  Negative for constipation and diarrhea.   Genitourinary:  Negative for difficulty urinating.   Musculoskeletal:  Negative for joint swelling.   Skin:  Negative for rash.   Neurological:  Negative for headaches.   Hematological:  Negative for adenopathy.   Psychiatric/Behavioral:  The patient is not nervous/anxious.      Past Medical History   Past Medical History[1]  Past Surgical History[1]  Family History[1]  Social History[1]  Medications[1]  Allergies  "  Allergen Reactions   • Aspirin Rash     Blisters in mouth   • Cephalexin Rash     Objective   /78 (BP Location: Left arm, Patient Position: Sitting, Cuff Size: Standard)   Pulse 84   Temp (!) 97.1 °F (36.2 °C) (Temporal)   Resp 17   Ht 4' 11.06\" (1.5 m)   Wt 84.8 kg (187 lb)   LMP  (LMP Unknown)   SpO2 96%   BMI 37.70 kg/m²     Physical Exam  Vitals and nursing note reviewed.   Constitutional:       General: She is not in acute distress.     Appearance: Normal appearance. She is well-developed. She is obese. She is not diaphoretic.   HENT:      Head: Normocephalic and atraumatic.      Right Ear: External ear normal.      Left Ear: External ear normal.     Eyes:      General: Lids are normal.         Right eye: No discharge.         Left eye: No discharge.      Conjunctiva/sclera: Conjunctivae normal.       Cardiovascular:      Rate and Rhythm: Normal rate and regular rhythm.      Heart sounds: No murmur heard.  Pulmonary:      Effort: Pulmonary effort is normal. No respiratory distress.      Breath sounds: Normal breath sounds. No wheezing.     Musculoskeletal:         General: No deformity.     Skin:     General: Skin is warm and dry.     Neurological:      General: No focal deficit present.      Mental Status: She is alert and oriented to person, place, and time.     Psychiatric:         Speech: Speech normal.         Behavior: Behavior normal.         Thought Content: Thought content normal.         Judgment: Judgment normal.           MARGUERITE Patel         [1]  Past Medical History:  Diagnosis Date   • Allergic    • Asthma    • Diabetes mellitus (HCC)    • GERD (gastroesophageal reflux disease)    • Hypertension    • Migraines    • Pneumonia    [1]  Past Surgical History:  Procedure Laterality Date   • HERNIA REPAIR      Umbilical as child   [1]  Family History  Problem Relation Name Age of Onset   • COPD Mother     • Diabetes Mother     • COPD Father     • No Known Problems Sister   "   • No Known Problems Maternal Grandmother     • No Known Problems Paternal Grandmother     • No Known Problems Maternal Aunt     • No Known Problems Maternal Aunt     • No Known Problems Paternal Aunt     [1]  Social History  Tobacco Use   • Smoking status: Every Day     Current packs/day: 1.00     Types: Cigarettes   • Smokeless tobacco: Current   Vaping Use   • Vaping status: Never Used   Substance and Sexual Activity   • Alcohol use: Never   • Drug use: Never   [1]  Current Outpatient Medications on File Prior to Visit   Medication Sig   • albuterol (2.5 mg/3 mL) 0.083 % nebulizer solution Take 3 mL (2.5 mg total) by nebulization every 4 (four) hours as needed for wheezing   • albuterol (PROVENTIL HFA,VENTOLIN HFA) 90 mcg/act inhaler Inhale 2 puffs every 6 (six) hours as needed for wheezing   • amitriptyline (ELAVIL) 10 mg tablet Take 2 tablets (20 mg total) by mouth daily at bedtime   • ascorbic acid (VITAMIN C) 500 MG tablet Take 1 tablet (500 mg total) by mouth 2 (two) times a day   • Blood Glucose Monitoring Suppl (OneTouch Verio Reflect) w/Device KIT Check blood sugars once daily. Please substitute with appropriate alternative as covered by patient's insurance. Dx: E11.65   • busPIRone (BUSPAR) 5 mg tablet TAKE 1 TABLET BY MOUTH TWICE A DAY   • cetirizine (ZyrTEC) 10 mg tablet Take 1 tablet (10 mg total) by mouth daily   • Cholecalciferol (Vitamin D3) 25 MCG TABS Take 1 tablet (25 mcg total) by mouth daily   • Diclofenac Sodium (VOLTAREN) 1 % Apply 2 g topically 4 (four) times a day   • ferrous sulfate 324 (65 Fe) mg Take 1 tablet (324 mg total) by mouth 2 (two) times a day before meals   • fluticasone (FLONASE) 50 mcg/act nasal spray SPRAY 2 SPRAYS INTO EACH NOSTRIL EVERY DAY   • Fluticasone-Salmeterol (Wixela Inhub) 250-50 mcg/dose inhaler Inhale 1 puff 2 (two) times a day Rinse mouth after use.   • glucose blood (OneTouch Verio) test strip Check blood sugars once daily. Please substitute with appropriate  alternative as covered by patient's insurance. Dx: E11.65   • lidocaine (Lidoderm) 5 % Apply 1 patch topically over 12 hours daily Remove & Discard patch within 12 hours or as directed by MD   • losartan (COZAAR) 25 mg tablet TAKE 1 TABLET (25 MG TOTAL) BY MOUTH DAILY.   • meloxicam (MOBIC) 15 mg tablet Take 1 tablet (15 mg total) by mouth daily   • metFORMIN (GLUCOPHAGE-XR) 500 mg 24 hr tablet TAKE 2 TABLETS BY MOUTH DAILY WITH DINNER   • metoprolol succinate (TOPROL-XL) 50 mg 24 hr tablet Take 1 tablet (50 mg total) by mouth daily   • montelukast (SINGULAIR) 10 mg tablet Take 1 tablet (10 mg total) by mouth daily at bedtime   • Multiple Vitamins-Minerals (multivitamin with minerals) tablet Take 1 tablet by mouth daily   • mupirocin (BACTROBAN) 2 % ointment Apply topically to the inside of the left and right nostrils twice daily for 5 days before surgery, including the morning of surgery.   • nystatin (MYCOSTATIN) powder Apply topically 3 (three) times a day   • omeprazole (PriLOSEC) 40 MG capsule Take 1 capsule (40 mg total) by mouth daily   • OneTouch Delica Lancets 33G MISC Check blood sugars once daily. Please substitute with appropriate alternative as covered by patient's insurance. Dx: E11.65   • rosuvastatin (CRESTOR) 10 MG tablet TAKE 1 TABLET BY MOUTH EVERY DAY   • Tirzepatide (Mounjaro) 5 MG/0.5ML SOAJ Inject 5 mg under the skin once a week   • topiramate (TOPAMAX) 25 mg tablet TAKE 3 TABLETS (75 MG TOTAL) BY MOUTH DAILY AT BEDTIME   • ZOLMitriptan (ZOMIG) 5 MG tablet TAKE 1 TABLET AT ONSET OF HEADACHE. MAX 2 TABLETS PER WEEK.   • Alcaftadine (Lastacaft) 0.25 % SOLN Administer 1 drop to both eyes daily as needed (allergic eye symptoms) (Patient not taking: Reported on 7/17/2025)   • azelastine (OPTIVAR) 0.05 % ophthalmic solution INSTILL 1 DROP INTO BOTH EYES TWICE A DAY (Patient not taking: Reported on 7/17/2025)   • folic acid (FOLVITE) 1 mg tablet Take 1 tablet (1 mg total) by mouth daily

## 2025-07-17 NOTE — ASSESSMENT & PLAN NOTE
Wt Readings from Last 3 Encounters:   07/17/25 84.8 kg (187 lb)   04/15/25 87.1 kg (192 lb)   04/09/25 87.1 kg (192 lb)     Improving with GLP

## 2025-07-17 NOTE — ASSESSMENT & PLAN NOTE
Using wixela- but only once per day. Increase to twice per day.   Use albuterol PRN- current use 7-8x week

## 2025-07-17 NOTE — ASSESSMENT & PLAN NOTE
Lab Results   Component Value Date    HGBA1C 6.3 (H) 06/30/2025   Well controlled.   Medication hold information previously provided to patient from specialist

## 2025-07-18 DIAGNOSIS — J30.2 SEASONAL ALLERGIES: ICD-10-CM

## 2025-07-18 RX ORDER — FLUTICASONE PROPIONATE 50 MCG
SPRAY, SUSPENSION (ML) NASAL
Qty: 48 ML | Refills: 1 | Status: SHIPPED | OUTPATIENT
Start: 2025-07-18

## 2025-07-19 DIAGNOSIS — M17.11 PRIMARY OSTEOARTHRITIS OF RIGHT KNEE: ICD-10-CM

## 2025-07-20 ENCOUNTER — ANESTHESIA EVENT (OUTPATIENT)
Dept: PERIOP | Facility: HOSPITAL | Age: 50
End: 2025-07-20
Payer: COMMERCIAL

## 2025-07-21 ENCOUNTER — ANESTHESIA (OUTPATIENT)
Dept: PERIOP | Facility: HOSPITAL | Age: 50
End: 2025-07-21
Payer: COMMERCIAL

## 2025-07-21 ENCOUNTER — HOSPITAL ENCOUNTER (OUTPATIENT)
Facility: HOSPITAL | Age: 50
Setting detail: OUTPATIENT SURGERY
Discharge: HOME | End: 2025-07-21
Attending: ORTHOPAEDIC SURGERY | Admitting: ORTHOPAEDIC SURGERY
Payer: COMMERCIAL

## 2025-07-21 ENCOUNTER — OFFICE VISIT (OUTPATIENT)
Dept: FAMILY MEDICINE CLINIC | Facility: CLINIC | Age: 50
End: 2025-07-21
Payer: COMMERCIAL

## 2025-07-21 ENCOUNTER — TELEPHONE (OUTPATIENT)
Age: 50
End: 2025-07-21

## 2025-07-21 ENCOUNTER — TELEPHONE (OUTPATIENT)
Dept: OBGYN CLINIC | Facility: CLINIC | Age: 50
End: 2025-07-21

## 2025-07-21 VITALS
WEIGHT: 191 LBS | SYSTOLIC BLOOD PRESSURE: 120 MMHG | BODY MASS INDEX: 38.51 KG/M2 | HEART RATE: 86 BPM | OXYGEN SATURATION: 97 % | HEIGHT: 59 IN | TEMPERATURE: 97.2 F | DIASTOLIC BLOOD PRESSURE: 80 MMHG

## 2025-07-21 VITALS
BODY MASS INDEX: 37.5 KG/M2 | SYSTOLIC BLOOD PRESSURE: 143 MMHG | DIASTOLIC BLOOD PRESSURE: 77 MMHG | HEART RATE: 74 BPM | TEMPERATURE: 97.6 F | RESPIRATION RATE: 20 BRPM | OXYGEN SATURATION: 98 % | HEIGHT: 59 IN | WEIGHT: 186 LBS

## 2025-07-21 DIAGNOSIS — L02.219 CELLULITIS AND ABSCESS OF TRUNK: ICD-10-CM

## 2025-07-21 DIAGNOSIS — L03.319 CELLULITIS AND ABSCESS OF TRUNK: ICD-10-CM

## 2025-07-21 DIAGNOSIS — J45.40 MODERATE PERSISTENT ASTHMA WITHOUT COMPLICATION: Primary | ICD-10-CM

## 2025-07-21 DIAGNOSIS — M17.11 PRIMARY OSTEOARTHRITIS OF RIGHT KNEE: ICD-10-CM

## 2025-07-21 DIAGNOSIS — B37.2 CANDIDIASIS, INTERTRIGO: Primary | ICD-10-CM

## 2025-07-21 LAB
EXT PREGNANCY TEST URINE: NEGATIVE
EXT. CONTROL: NORMAL
GLUCOSE SERPL-MCNC: 150 MG/DL (ref 65–140)

## 2025-07-21 PROCEDURE — 99214 OFFICE O/P EST MOD 30 MIN: CPT

## 2025-07-21 PROCEDURE — 81025 URINE PREGNANCY TEST: CPT | Performed by: ORTHOPAEDIC SURGERY

## 2025-07-21 PROCEDURE — 82948 REAGENT STRIP/BLOOD GLUCOSE: CPT

## 2025-07-21 RX ORDER — LOSARTAN POTASSIUM 25 MG/1
25 TABLET ORAL DAILY
Status: CANCELLED | OUTPATIENT
Start: 2025-07-21

## 2025-07-21 RX ORDER — FLUTICASONE PROPIONATE AND SALMETEROL 250; 50 UG/1; UG/1
1 POWDER RESPIRATORY (INHALATION) 2 TIMES DAILY
Status: CANCELLED | OUTPATIENT
Start: 2025-07-21

## 2025-07-21 RX ORDER — LORATADINE 10 MG/1
5 TABLET ORAL DAILY
Status: CANCELLED | OUTPATIENT
Start: 2025-07-21

## 2025-07-21 RX ORDER — ACETAMINOPHEN 325 MG/1
975 TABLET ORAL ONCE
Status: CANCELLED | OUTPATIENT
Start: 2025-07-21 | End: 2025-07-21

## 2025-07-21 RX ORDER — DOCUSATE SODIUM 100 MG/1
100 CAPSULE, LIQUID FILLED ORAL 2 TIMES DAILY
Status: CANCELLED | OUTPATIENT
Start: 2025-07-21

## 2025-07-21 RX ORDER — OXYCODONE HYDROCHLORIDE 10 MG/1
10 TABLET ORAL EVERY 4 HOURS PRN
Refills: 0 | Status: CANCELLED | OUTPATIENT
Start: 2025-07-21

## 2025-07-21 RX ORDER — CHLORHEXIDINE GLUCONATE ORAL RINSE 1.2 MG/ML
15 SOLUTION DENTAL ONCE
Status: COMPLETED | OUTPATIENT
Start: 2025-07-21 | End: 2025-07-21

## 2025-07-21 RX ORDER — PNV NO.95/FERROUS FUM/FOLIC AC 28MG-0.8MG
324 TABLET ORAL EVERY 24 HOURS
Status: CANCELLED | OUTPATIENT
Start: 2025-07-21

## 2025-07-21 RX ORDER — BUPIVACAINE HYDROCHLORIDE 2.5 MG/ML
INJECTION, SOLUTION EPIDURAL; INFILTRATION; INTRACAUDAL; PERINEURAL
Status: COMPLETED | OUTPATIENT
Start: 2025-07-21 | End: 2025-07-21

## 2025-07-21 RX ORDER — CLINDAMYCIN PHOSPHATE 900 MG/50ML
900 INJECTION, SOLUTION INTRAVENOUS ONCE
Status: COMPLETED | OUTPATIENT
Start: 2025-07-21 | End: 2025-07-21

## 2025-07-21 RX ORDER — HYDROMORPHONE HCL/PF 1 MG/ML
0.5 SYRINGE (ML) INJECTION
Status: DISCONTINUED | OUTPATIENT
Start: 2025-07-21 | End: 2025-07-21 | Stop reason: HOSPADM

## 2025-07-21 RX ORDER — CHLORHEXIDINE GLUCONATE 40 MG/ML
SOLUTION TOPICAL DAILY PRN
Status: DISCONTINUED | OUTPATIENT
Start: 2025-07-21 | End: 2025-07-21 | Stop reason: HOSPADM

## 2025-07-21 RX ORDER — MIDAZOLAM HYDROCHLORIDE 2 MG/2ML
INJECTION, SOLUTION INTRAMUSCULAR; INTRAVENOUS AS NEEDED
Status: DISCONTINUED | OUTPATIENT
Start: 2025-07-21 | End: 2025-07-21

## 2025-07-21 RX ORDER — MAGNESIUM HYDROXIDE/ALUMINUM HYDROXICE/SIMETHICONE 120; 1200; 1200 MG/30ML; MG/30ML; MG/30ML
30 SUSPENSION ORAL EVERY 6 HOURS PRN
Status: CANCELLED | OUTPATIENT
Start: 2025-07-21

## 2025-07-21 RX ORDER — CELECOXIB 100 MG/1
200 CAPSULE ORAL ONCE
Status: COMPLETED | OUTPATIENT
Start: 2025-07-21 | End: 2025-07-21

## 2025-07-21 RX ORDER — ACETAMINOPHEN 325 MG/1
975 TABLET ORAL ONCE
Status: COMPLETED | OUTPATIENT
Start: 2025-07-21 | End: 2025-07-21

## 2025-07-21 RX ORDER — CLINDAMYCIN PHOSPHATE 600 MG/50ML
600 INJECTION, SOLUTION INTRAVENOUS EVERY 8 HOURS
Status: CANCELLED | OUTPATIENT
Start: 2025-07-21 | End: 2025-07-22

## 2025-07-21 RX ORDER — BUSPIRONE HYDROCHLORIDE 5 MG/1
5 TABLET ORAL 2 TIMES DAILY
Status: CANCELLED | OUTPATIENT
Start: 2025-07-21

## 2025-07-21 RX ORDER — AZELASTINE HYDROCHLORIDE 0.5 MG/ML
1 SOLUTION/ DROPS OPHTHALMIC EVERY 24 HOURS
Status: CANCELLED | OUTPATIENT
Start: 2025-07-21

## 2025-07-21 RX ORDER — MULTIVITAMIN
1 TABLET ORAL DAILY
COMMUNITY
Start: 2025-06-25

## 2025-07-21 RX ORDER — ROSUVASTATIN CALCIUM 10 MG/1
10 TABLET, COATED ORAL DAILY
Status: CANCELLED | OUTPATIENT
Start: 2025-07-21

## 2025-07-21 RX ORDER — FERROUS SULFATE 324(65)MG
324 TABLET, DELAYED RELEASE (ENTERIC COATED) ORAL
Qty: 180 TABLET | Refills: 1 | OUTPATIENT
Start: 2025-07-21

## 2025-07-21 RX ORDER — METFORMIN HYDROCHLORIDE 500 MG/1
500 TABLET, EXTENDED RELEASE ORAL
Status: CANCELLED | OUTPATIENT
Start: 2025-07-21

## 2025-07-21 RX ORDER — FENTANYL CITRATE 50 UG/ML
INJECTION, SOLUTION INTRAMUSCULAR; INTRAVENOUS CONTINUOUS PRN
Status: DISCONTINUED | OUTPATIENT
Start: 2025-07-21 | End: 2025-07-21

## 2025-07-21 RX ORDER — OXYCODONE HYDROCHLORIDE 5 MG/1
5 TABLET ORAL EVERY 4 HOURS PRN
Refills: 0 | Status: CANCELLED | OUTPATIENT
Start: 2025-07-21

## 2025-07-21 RX ORDER — METOPROLOL SUCCINATE 50 MG/1
50 TABLET, EXTENDED RELEASE ORAL DAILY
Status: CANCELLED | OUTPATIENT
Start: 2025-07-21

## 2025-07-21 RX ORDER — TOPIRAMATE 25 MG/1
75 TABLET, FILM COATED ORAL
Status: CANCELLED | OUTPATIENT
Start: 2025-07-21

## 2025-07-21 RX ORDER — ASCORBIC ACID 500 MG
500 TABLET ORAL 2 TIMES DAILY
Status: CANCELLED | OUTPATIENT
Start: 2025-07-21

## 2025-07-21 RX ORDER — SODIUM CHLORIDE, SODIUM LACTATE, POTASSIUM CHLORIDE, CALCIUM CHLORIDE 600; 310; 30; 20 MG/100ML; MG/100ML; MG/100ML; MG/100ML
1.5 INJECTION, SOLUTION INTRAVENOUS CONTINUOUS
Status: CANCELLED | OUTPATIENT
Start: 2025-07-21

## 2025-07-21 RX ORDER — MULTIVITAMIN
1 TABLET ORAL DAILY
Qty: 90 TABLET | Refills: 1 | OUTPATIENT
Start: 2025-07-21

## 2025-07-21 RX ORDER — TRANEXAMIC ACID 10 MG/ML
1000 INJECTION, SOLUTION INTRAVENOUS ONCE
Status: DISCONTINUED | OUTPATIENT
Start: 2025-07-21 | End: 2025-07-21 | Stop reason: HOSPADM

## 2025-07-21 RX ORDER — MIDAZOLAM HYDROCHLORIDE 2 MG/2ML
INJECTION, SOLUTION INTRAMUSCULAR; INTRAVENOUS
Status: COMPLETED
Start: 2025-07-21 | End: 2025-07-21

## 2025-07-21 RX ORDER — AMITRIPTYLINE HYDROCHLORIDE 10 MG/1
20 TABLET ORAL
Status: CANCELLED | OUTPATIENT
Start: 2025-07-21

## 2025-07-21 RX ORDER — FONDAPARINUX SODIUM 2.5 MG/.5ML
2.5 INJECTION SUBCUTANEOUS EVERY 24 HOURS
Status: CANCELLED | OUTPATIENT
Start: 2025-07-21

## 2025-07-21 RX ORDER — SODIUM CHLORIDE, SODIUM LACTATE, POTASSIUM CHLORIDE, CALCIUM CHLORIDE 600; 310; 30; 20 MG/100ML; MG/100ML; MG/100ML; MG/100ML
INJECTION, SOLUTION INTRAVENOUS CONTINUOUS PRN
Status: DISCONTINUED | OUTPATIENT
Start: 2025-07-21 | End: 2025-07-21

## 2025-07-21 RX ORDER — NYSTATIN 100000 [USP'U]/G
POWDER TOPICAL 3 TIMES DAILY
Status: CANCELLED | OUTPATIENT
Start: 2025-07-21

## 2025-07-21 RX ORDER — PANTOPRAZOLE SODIUM 20 MG/1
20 TABLET, DELAYED RELEASE ORAL
Status: CANCELLED | OUTPATIENT
Start: 2025-07-21

## 2025-07-21 RX ORDER — PROMETHAZINE HYDROCHLORIDE 25 MG/ML
6.25 INJECTION, SOLUTION INTRAMUSCULAR; INTRAVENOUS ONCE AS NEEDED
Status: DISCONTINUED | OUTPATIENT
Start: 2025-07-21 | End: 2025-07-21 | Stop reason: HOSPADM

## 2025-07-21 RX ORDER — ASCORBIC ACID 500 MG
500 TABLET ORAL 2 TIMES DAILY
Qty: 180 TABLET | Refills: 1 | OUTPATIENT
Start: 2025-07-21

## 2025-07-21 RX ORDER — MONTELUKAST SODIUM 10 MG/1
10 TABLET ORAL
Status: CANCELLED | OUTPATIENT
Start: 2025-07-21

## 2025-07-21 RX ORDER — FLUTICASONE PROPIONATE 50 MCG
1 SPRAY, SUSPENSION (ML) NASAL DAILY
Status: CANCELLED | OUTPATIENT
Start: 2025-07-21

## 2025-07-21 RX ORDER — ALBUTEROL SULFATE 90 UG/1
2 INHALANT RESPIRATORY (INHALATION) EVERY 6 HOURS PRN
Status: CANCELLED | OUTPATIENT
Start: 2025-07-21

## 2025-07-21 RX ORDER — KETOCONAZOLE 20 MG/G
CREAM TOPICAL DAILY
Qty: 30 G | Refills: 1 | Status: SHIPPED | OUTPATIENT
Start: 2025-07-21

## 2025-07-21 RX ORDER — ONDANSETRON 2 MG/ML
INJECTION INTRAMUSCULAR; INTRAVENOUS AS NEEDED
Status: DISCONTINUED | OUTPATIENT
Start: 2025-07-21 | End: 2025-07-21

## 2025-07-21 RX ORDER — FOLIC ACID 1 MG/1
1 TABLET ORAL DAILY
Status: CANCELLED | OUTPATIENT
Start: 2025-07-21

## 2025-07-21 RX ORDER — SULFAMETHOXAZOLE AND TRIMETHOPRIM 800; 160 MG/1; MG/1
1 TABLET ORAL 2 TIMES DAILY
Qty: 14 TABLET | Refills: 0 | Status: SHIPPED | OUTPATIENT
Start: 2025-07-21 | End: 2025-07-28

## 2025-07-21 RX ADMIN — CLINDAMYCIN PHOSPHATE 900 MG: 900 INJECTION, SOLUTION INTRAVENOUS at 07:55

## 2025-07-21 RX ADMIN — BUPIVACAINE HYDROCHLORIDE 20 ML: 2.5 INJECTION, SOLUTION EPIDURAL; INFILTRATION; INTRACAUDAL; PERINEURAL at 07:28

## 2025-07-21 RX ADMIN — ACETAMINOPHEN 975 MG: 325 TABLET ORAL at 07:16

## 2025-07-21 RX ADMIN — SODIUM CHLORIDE, SODIUM LACTATE, POTASSIUM CHLORIDE, AND CALCIUM CHLORIDE: .6; .31; .03; .02 INJECTION, SOLUTION INTRAVENOUS at 07:39

## 2025-07-21 RX ADMIN — CELECOXIB 200 MG: 100 CAPSULE ORAL at 07:16

## 2025-07-21 RX ADMIN — ONDANSETRON 6 MG: 2 INJECTION INTRAMUSCULAR; INTRAVENOUS at 07:45

## 2025-07-21 RX ADMIN — MIDAZOLAM 1 MG: 1 INJECTION INTRAMUSCULAR; INTRAVENOUS at 07:28

## 2025-07-21 RX ADMIN — MIDAZOLAM 3 MG: 1 INJECTION INTRAMUSCULAR; INTRAVENOUS at 07:27

## 2025-07-21 RX ADMIN — CHLORHEXIDINE GLUCONATE 15 ML: 1.2 SOLUTION ORAL at 07:12

## 2025-07-21 RX ADMIN — BUPIVACAINE 20 ML: 13.3 INJECTION, SUSPENSION, LIPOSOMAL INFILTRATION at 07:28

## 2025-07-21 NOTE — ANESTHESIA POSTPROCEDURE EVALUATION
Post-Op Assessment Note    CV Status:  Stable    Pain management: adequate       Mental Status:  Alert and awake   Hydration Status:  Euvolemic   PONV Controlled:  Controlled   Airway Patency:  Patent  Two or more mitigation strategies used for obstructive sleep apnea   Post Op Vitals Reviewed: Yes    No anethesia notable event occurred.    Staff: Anesthesiologist, CRNA           Last Filed PACU Vitals:  Vitals Value Taken Time   Temp 97.7    Pulse 83 07/21/25 08:06   BP 94/64 07/21/25 08:06   Resp 22 07/21/25 08:06   SpO2 99

## 2025-07-21 NOTE — ANESTHESIA PROCEDURE NOTES
Spinal Block    Patient location during procedure: OR  Start time: 7/21/2025 7:50 AM  Reason for block: primary anesthetic  Staffing  Performed by: Bettie Rodriguez CRNA  Authorized by: Yahir Prather MD    Preanesthetic Checklist  Completed: patient identified, IV checked, site marked, risks and benefits discussed, surgical consent, monitors and equipment checked, pre-op evaluation and timeout performed  Spinal Block  Patient position: sitting  Prep: ChloraPrep  Patient monitoring: heart rate, cardiac monitor, continuous pulse ox and frequent blood pressure checks  Approach: midline  Location: L3-4  Needle  Needle type: Pencan   Needle gauge: 24 G  Needle length: 4 in  Assessment  Injection Assessment:  negative aspiration for heme, no paresthesia on injection and positive aspiration for clear CSF.  Post-procedure:  site cleaned

## 2025-07-21 NOTE — TELEPHONE ENCOUNTER
Received call from patient stating she is currently admitted for TKR and states she was put under anesthesia and when she woke up she was told they could not  move forward with her procedure because she has what appears to be a cyst/boil on stomach that appears to be infected.   Her surgeon advised she must be seen by her PCP today and placed on abx.     I called practice and spoke with zulema   She stated she will follow up with patient directly

## 2025-07-21 NOTE — ANESTHESIA PROCEDURE NOTES
Peripheral Block    Patient location during procedure: holding area  Start time: 7/21/2025 7:28 AM  Reason for block: at surgeon's request and post-op pain management  Staffing  Performed by: Yahir Prather MD  Authorized by: Yahir Prather MD    Preanesthetic Checklist  Completed: patient identified, IV checked, site marked, risks and benefits discussed, surgical consent, monitors and equipment checked, pre-op evaluation and timeout performed  Peripheral Block  Patient position: supine  Prep: ChloraPrep  Patient monitoring: continuous pulse ox and frequent blood pressure checks  Block type: adductor canal block  Laterality: right  Injection technique: single-shot  Procedures: ultrasound guided, Ultrasound guidance required for the procedure to increase accuracy and safety of medication placement and decrease risk of complications.  Ultrasound permanent image saved  bupivacaine (PF) (MARCAINE) 0.25 % injection 20 mL - Perineural   20 mL - 7/21/2025 7:28:00 AM  Needle  Needle type: Stimuplex   Needle gauge: 20 G  Needle length: 4 in  Needle localization: ultrasound guidance  Assessment  Injection assessment: incremental injection and local visualized surrounding nerve on ultrasound  Paresthesia pain: none  patient tolerated the procedure well with no immediate complications  Additional Notes  Uncomplicated adductor canal block (saphenous nerve, nerve to vastus medialis)  Superficial femoral artery identified  Dose: 20ml exparel + 10ml 0.25% bupivacaine    Supplemented with 10ml 0.25% bupivacaine for anterior femoral cutaneous nerve

## 2025-07-21 NOTE — INTERVAL H&P NOTE
H&P reviewed. After examining the patient I find no changes in the patients condition since the H&P had been written.    Vitals:    07/21/25 0722   BP: 130/81   Pulse: 88   Resp: 20   Temp:    SpO2: 97%

## 2025-07-21 NOTE — PROGRESS NOTES
Name: Iris Curtis      : 1975      MRN: [id number]  Encounter Provider: Violette Baker PA-C  Encounter Date: 2025   Encounter department: [facility]  :  Assessment & Plan  Candidiasis, intertrigo  New diagnosis acute symptomatic  In the groin and under the pannus  -Recommend ketoconazole cream.  Recommend that after showering, dry the entire area,  Then apply ketoconazole cream, lift up the pannus so that it can dry.  Can also apply baby powder afterwards to keep the area dry  - Patient has follow-up already scheduled in a month to follow-up diabetes, needs good sugar control to prevent further infection    Orders:  •  ketoconazole (NIZORAL) 2 % cream; Apply topically daily    Cellulitis and abscess of trunk  New diagnosis acute symptomatic  Draining  -Recommend oral antibiotics.  Treating fungal infection as stated above.  Orders:  •  sulfamethoxazole-trimethoprim (BACTRIM DS) 800-160 mg per tablet; Take 1 tablet by mouth in the morning and 1 tablet before bedtime. Do all this for 7 days.           History of Present Illness {?Quick Links Encounters * My Last Note * Last Note in Specialty * Snapshot * Since Last Visit * History :[id number]}  While at knee arhtroplasty surgery was being put under when they noticed  a wound under the pannus  It was actively draining  There were multiple areas  Patient states is always red with bumps but she is never had an infection  Now it really hurts  No fever chills  It is in the groin region as well as under the pannus      Review of Systems   Constitutional:  Negative for fever.   Respiratory:  Negative for shortness of breath.    Cardiovascular:  Negative for chest pain.   Skin:  Positive for rash and wound.       Objective {?Quick Links Trend Vitals * Enter New Vitals * Results Review * Timeline (Adult) * Labs * Imaging * Cardiology * Procedures * Lung Cancer Screening * Surgical eConsent :[id number]}  /80 (BP Location: Left  "arm, Patient Position: Sitting, Cuff Size: Large)   Pulse 86   Temp (!) 97.2 °F (36.2 °C) (Temporal)   Ht 4' 11.17\" (1.503 m)   Wt 86.6 kg (191 lb)   LMP 06/23/2025 (Approximate)   SpO2 97%   BMI 38.35 kg/m²      Physical Exam  Constitutional:       General: She is not in acute distress.     Appearance: She is well-developed.   HENT:      Head: Normocephalic and atraumatic.     Eyes:      Conjunctiva/sclera: Conjunctivae normal.     Pulmonary:      Effort: Pulmonary effort is normal. No respiratory distress.   Abdominal:      Palpations: Abdomen is soft.      Tenderness: There is no abdominal tenderness.     Musculoskeletal:         General: No swelling.      Cervical back: Normal range of motion and neck supple.     Skin:     General: Skin is warm and dry.     Neurological:      Mental Status: She is alert.     Psychiatric:         Mood and Affect: Mood normal.               "

## 2025-07-21 NOTE — ANESTHESIA PROCEDURE NOTES
Peripheral Block    Patient location during procedure: pre-op  Start time: 7/21/2025 7:28 AM  Reason for block: at surgeon's request and post-op pain management  Staffing  Performed by: Yahir Prather MD  Authorized by: Yahir Prather MD    Preanesthetic Checklist  Completed: patient identified, IV checked, site marked, risks and benefits discussed, surgical consent, monitors and equipment checked, pre-op evaluation and timeout performed  Peripheral Block  Patient position: supine  Prep: ChloraPrep  Patient monitoring: heart rate, continuous pulse ox and frequent blood pressure checks  Block type: ipack block  Laterality: right  Injection technique: single-shot  Procedures: ultrasound guided, Ultrasound guidance required for the procedure to increase accuracy and safety of medication placement and decrease risk of complications.  Ultrasound permanent image saved  bupivacaine (PF) (MARCAINE) 0.25 % injection 20 mL - Perineural   20 mL - 7/21/2025 7:28:00 AM  Needle  Needle type: Stimuplex   Needle gauge: 20 G  Needle length: 4 in  Needle localization: ultrasound guidance  Assessment  Injection assessment: incremental injection, local visualized surrounding nerve on ultrasound, negative aspiration for heme and no paresthesia on injection  patient tolerated the procedure well with no immediate complications  Additional Notes  Uncomplicated iPACK block  LA deposited above shaft of femur just proximal to femoral condyles  Dose: 20ml 0.25% bupivacaine

## 2025-07-21 NOTE — ANESTHESIA PREPROCEDURE EVALUATION
Procedure:  RIGHT TOTAL KNEE ARTHROPLASTY (Right: Knee)    Relevant Problems   CARDIO   (+) Hyperlipidemia   (+) Migraine without status migrainosus, not intractable      ENDO   (+) Type 2 diabetes mellitus without complication, without long-term current use of insulin (HCC)      GI/HEPATIC   (+) Gastroesophageal reflux disease      MUSCULOSKELETAL   (+) Primary osteoarthritis of right knee      NEURO/PSYCH   (+) Anxiety   (+) Migraine without status migrainosus, not intractable      PULMONARY   (+) Moderate persistent asthma without complication        Physical Exam    Airway     Mallampati score: I  TM Distance: >3 FB  Neck ROM: full  Mouth opening: >= 4 cm      Cardiovascular  Rhythm: regular, Rate: normal, Pulse is strong.     Dental   No notable dental hx     Pulmonary   Breath sounds clear to auscultation    Neurological    She appears alert and oriented x3.      Other Findings      post-pubertal.            Anesthesia Plan  ASA Score- 2     Anesthesia Type- spinal with ASA Monitors.         Additional Monitors:     Airway Plan:     Comment: Discussed benefits/risks of neuraxial anesthesia including possibility of discomfort at site of injection, post-dural puncture headache, block failure, and more rare complications such as bleeding, infection, and permanent nerve damage. If block fails or there is difficulty placing neuraxial block, general anesthesia was discussed as back-up plan. Patient understands and wishes to proceed. All questions answered. \      Discussed nerve block to assist with post-operative analgesia. Discussed possibility of uncommon complications including permanent nerve injury, damage to blood vessels, infection local anesthetic toxicity, and nerve block failure. Patient understands and wishes to proceed.         .       Plan Factors-Exercise tolerance (METS): >4 METS.    Chart reviewed. EKG reviewed.  Existing labs reviewed.                   Induction-     Postoperative Plan- Plan for  postoperative opioid use.   Monitoring Plan -   Post Operative Pain Plan - plan for postoperative opioid use        Informed Consent- Anesthetic plan and risks discussed with patient.  I personally reviewed this patient with the CRNA. Discussed and agreed on the Anesthesia Plan with the CRNA..      NPO Status:  No vitals data found for the desired time range.

## 2025-07-21 NOTE — ANESTHESIA POSTPROCEDURE EVALUATION
Post-Op Assessment Note    CV Status:  Stable    Pain management: adequate       Mental Status:  Awake   Hydration Status:  Euvolemic   PONV Controlled:  Controlled   Airway Patency:  Patent     Post Op Vitals Reviewed: Yes    No anethesia notable event occurred.    Staff: Anesthesiologist           Last Filed PACU Vitals:  Vitals Value Taken Time   Temp 97.4 °F (36.3 °C) 07/21/25 08:35   Pulse 80 07/21/25 08:35   /65 07/21/25 08:35   Resp 22 07/21/25 08:35   SpO2 99 % 07/21/25 08:35       Modified Sammy:     Vitals Value Taken Time   Activity 2 07/21/25 08:35   Respiration 2 07/21/25 08:35   Circulation 2 07/21/25 08:35   Consciousness 2 07/21/25 08:35   Oxygen Saturation 2 07/21/25 08:35     Modified Sammy Score: 10

## 2025-07-23 ENCOUNTER — APPOINTMENT (OUTPATIENT)
Dept: PHYSICAL THERAPY | Facility: CLINIC | Age: 50
End: 2025-07-23
Attending: PHYSICIAN ASSISTANT
Payer: COMMERCIAL

## 2025-07-24 NOTE — TELEPHONE ENCOUNTER
7/21/25 RT TKA not preformed due to skin infection: Spoke with Anna who has be evaluated by her PCP for the skin rash and prescribed topical ointment and oral antibiotics. An appointment has been scheduled with Dr Muñoz for a skin check prior to rescheduling and a new hold date of 11/12.

## 2025-08-06 ENCOUNTER — OFFICE VISIT (OUTPATIENT)
Dept: FAMILY MEDICINE CLINIC | Facility: CLINIC | Age: 50
End: 2025-08-06
Payer: COMMERCIAL

## 2025-08-06 VITALS
RESPIRATION RATE: 16 BRPM | HEART RATE: 93 BPM | WEIGHT: 184 LBS | DIASTOLIC BLOOD PRESSURE: 86 MMHG | SYSTOLIC BLOOD PRESSURE: 130 MMHG | BODY MASS INDEX: 37.09 KG/M2 | HEIGHT: 59 IN | TEMPERATURE: 96.1 F | OXYGEN SATURATION: 98 %

## 2025-08-06 DIAGNOSIS — J30.2 SEASONAL ALLERGIES: ICD-10-CM

## 2025-08-06 DIAGNOSIS — G43.909 MIGRAINE WITHOUT STATUS MIGRAINOSUS, NOT INTRACTABLE, UNSPECIFIED MIGRAINE TYPE: ICD-10-CM

## 2025-08-06 DIAGNOSIS — L03.319 CELLULITIS AND ABSCESS OF TRUNK: ICD-10-CM

## 2025-08-06 DIAGNOSIS — L02.219 CELLULITIS AND ABSCESS OF TRUNK: ICD-10-CM

## 2025-08-06 DIAGNOSIS — J45.40 MODERATE PERSISTENT ASTHMA WITHOUT COMPLICATION: Chronic | ICD-10-CM

## 2025-08-06 DIAGNOSIS — E11.9 TYPE 2 DIABETES MELLITUS WITHOUT COMPLICATION, WITHOUT LONG-TERM CURRENT USE OF INSULIN (HCC): Primary | ICD-10-CM

## 2025-08-06 DIAGNOSIS — M17.11 PRIMARY OSTEOARTHRITIS OF RIGHT KNEE: ICD-10-CM

## 2025-08-06 DIAGNOSIS — B37.2 CANDIDIASIS, INTERTRIGO: ICD-10-CM

## 2025-08-06 DIAGNOSIS — F17.200 CURRENT SMOKER: ICD-10-CM

## 2025-08-06 PROCEDURE — 99214 OFFICE O/P EST MOD 30 MIN: CPT | Performed by: NURSE PRACTITIONER

## 2025-08-06 RX ORDER — FOLIC ACID 1 MG/1
1000 TABLET ORAL DAILY
Qty: 30 TABLET | Refills: 1 | OUTPATIENT
Start: 2025-08-06

## 2025-08-06 RX ORDER — NYSTATIN 100000 [USP'U]/G
POWDER TOPICAL 2 TIMES DAILY
Qty: 60 G | Refills: 2 | Status: SHIPPED | OUTPATIENT
Start: 2025-08-06

## 2025-08-06 RX ORDER — TIRZEPATIDE 7.5 MG/.5ML
7.5 INJECTION, SOLUTION SUBCUTANEOUS WEEKLY
Qty: 2 ML | Refills: 2 | Status: SHIPPED | OUTPATIENT
Start: 2025-08-06

## 2025-08-06 RX ORDER — CETIRIZINE HYDROCHLORIDE 10 MG/1
10 TABLET ORAL DAILY
Qty: 90 TABLET | Refills: 1 | Status: SHIPPED | OUTPATIENT
Start: 2025-08-06

## 2025-08-07 RX ORDER — ZOLMITRIPTAN 5 MG/1
TABLET, FILM COATED ORAL
Qty: 5 TABLET | Refills: 1 | Status: SHIPPED | OUTPATIENT
Start: 2025-08-07

## 2025-08-12 ENCOUNTER — PREP FOR PROCEDURE (OUTPATIENT)
Dept: OBGYN CLINIC | Facility: CLINIC | Age: 50
End: 2025-08-12

## 2025-08-12 ENCOUNTER — OFFICE VISIT (OUTPATIENT)
Dept: OBGYN CLINIC | Facility: CLINIC | Age: 50
End: 2025-08-12
Payer: COMMERCIAL